# Patient Record
Sex: MALE | Race: WHITE | NOT HISPANIC OR LATINO | Employment: FULL TIME | URBAN - METROPOLITAN AREA
[De-identification: names, ages, dates, MRNs, and addresses within clinical notes are randomized per-mention and may not be internally consistent; named-entity substitution may affect disease eponyms.]

---
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
## 2021-02-05 ENCOUNTER — APPOINTMENT (OUTPATIENT)
Dept: CT IMAGING | Facility: HOSPITAL | Age: 21
End: 2021-02-05

## 2021-02-05 ENCOUNTER — HOSPITAL ENCOUNTER (EMERGENCY)
Facility: HOSPITAL | Age: 21
Discharge: HOME | End: 2021-02-05
Attending: EMERGENCY MEDICINE | Admitting: EMERGENCY MEDICINE

## 2021-02-05 VITALS
RESPIRATION RATE: 16 BRPM | WEIGHT: 239.2 LBS | BODY MASS INDEX: 37.54 KG/M2 | DIASTOLIC BLOOD PRESSURE: 86 MMHG | OXYGEN SATURATION: 98 % | HEART RATE: 75 BPM | HEIGHT: 67 IN | TEMPERATURE: 98.2 F | SYSTOLIC BLOOD PRESSURE: 130 MMHG

## 2021-02-05 DIAGNOSIS — R10.9 ACUTE ABDOMINAL PAIN: Primary | ICD-10-CM

## 2021-02-05 DIAGNOSIS — K52.9 GASTROENTERITIS: ICD-10-CM

## 2021-02-05 DIAGNOSIS — R11.10 NON-INTRACTABLE VOMITING, PRESENCE OF NAUSEA NOT SPECIFIED, UNSPECIFIED VOMITING TYPE: ICD-10-CM

## 2021-02-05 LAB
ALBUMIN SERPL-MCNC: 4.4 G/DL (ref 3.5–5.2)
ALBUMIN/GLOB SERPL: 1.3 G/DL
ALP SERPL-CCNC: 76 U/L (ref 39–117)
ALT SERPL W P-5'-P-CCNC: 26 U/L (ref 1–41)
ANION GAP SERPL CALCULATED.3IONS-SCNC: 12 MMOL/L (ref 5–15)
AST SERPL-CCNC: 22 U/L (ref 1–40)
BASOPHILS # BLD AUTO: 0 10*3/MM3 (ref 0–0.2)
BASOPHILS NFR BLD AUTO: 0.2 % (ref 0–1.5)
BILIRUB SERPL-MCNC: 0.7 MG/DL (ref 0–1.2)
BILIRUB UR QL STRIP: NEGATIVE
BUN SERPL-MCNC: 17 MG/DL (ref 6–20)
BUN/CREAT SERPL: 15.2 (ref 7–25)
CALCIUM SPEC-SCNC: 9 MG/DL (ref 8.6–10.5)
CHLORIDE SERPL-SCNC: 103 MMOL/L (ref 98–107)
CLARITY UR: CLEAR
CO2 SERPL-SCNC: 20 MMOL/L (ref 22–29)
COLOR UR: YELLOW
CREAT SERPL-MCNC: 1.12 MG/DL (ref 0.76–1.27)
DEPRECATED RDW RBC AUTO: 40.3 FL (ref 37–54)
EOSINOPHIL # BLD AUTO: 0 10*3/MM3 (ref 0–0.4)
EOSINOPHIL NFR BLD AUTO: 0.1 % (ref 0.3–6.2)
ERYTHROCYTE [DISTWIDTH] IN BLOOD BY AUTOMATED COUNT: 12.7 % (ref 12.3–15.4)
GFR SERPL CREATININE-BSD FRML MDRD: 82 ML/MIN/1.73
GLOBULIN UR ELPH-MCNC: 3.3 GM/DL
GLUCOSE SERPL-MCNC: 155 MG/DL (ref 65–99)
GLUCOSE UR STRIP-MCNC: NEGATIVE MG/DL
HCT VFR BLD AUTO: 44.8 % (ref 37.5–51)
HGB BLD-MCNC: 15.6 G/DL (ref 13–17.7)
HGB UR QL STRIP.AUTO: NEGATIVE
KETONES UR QL STRIP: NEGATIVE
LEUKOCYTE ESTERASE UR QL STRIP.AUTO: NEGATIVE
LIPASE SERPL-CCNC: 20 U/L (ref 13–60)
LYMPHOCYTES # BLD AUTO: 0.4 10*3/MM3 (ref 0.7–3.1)
LYMPHOCYTES NFR BLD AUTO: 4.8 % (ref 19.6–45.3)
MCH RBC QN AUTO: 31.2 PG (ref 26.6–33)
MCHC RBC AUTO-ENTMCNC: 34.9 G/DL (ref 31.5–35.7)
MCV RBC AUTO: 89.3 FL (ref 79–97)
MONOCYTES # BLD AUTO: 0.5 10*3/MM3 (ref 0.1–0.9)
MONOCYTES NFR BLD AUTO: 5 % (ref 5–12)
NEUTROPHILS NFR BLD AUTO: 8.5 10*3/MM3 (ref 1.7–7)
NEUTROPHILS NFR BLD AUTO: 89.9 % (ref 42.7–76)
NITRITE UR QL STRIP: NEGATIVE
NRBC BLD AUTO-RTO: 0 /100 WBC (ref 0–0.2)
PH UR STRIP.AUTO: 5.5 [PH] (ref 5–8)
PLATELET # BLD AUTO: 250 10*3/MM3 (ref 140–450)
PMV BLD AUTO: 7.9 FL (ref 6–12)
POTASSIUM SERPL-SCNC: 3.9 MMOL/L (ref 3.5–5.2)
PROT SERPL-MCNC: 7.7 G/DL (ref 6–8.5)
PROT UR QL STRIP: NEGATIVE
RBC # BLD AUTO: 5.01 10*6/MM3 (ref 4.14–5.8)
SARS-COV-2 RNA PNL SPEC NAA+PROBE: NORMAL
SODIUM SERPL-SCNC: 135 MMOL/L (ref 136–145)
SP GR UR STRIP: 1.08 (ref 1–1.03)
UROBILINOGEN UR QL STRIP: ABNORMAL
WBC # BLD AUTO: 9.4 10*3/MM3 (ref 3.4–10.8)
WHOLE BLOOD HOLD SPECIMEN: NORMAL

## 2021-02-05 PROCEDURE — 81003 URINALYSIS AUTO W/O SCOPE: CPT | Performed by: EMERGENCY MEDICINE

## 2021-02-05 PROCEDURE — 25010000002 ONDANSETRON PER 1 MG: Performed by: EMERGENCY MEDICINE

## 2021-02-05 PROCEDURE — 96374 THER/PROPH/DIAG INJ IV PUSH: CPT

## 2021-02-05 PROCEDURE — 87635 SARS-COV-2 COVID-19 AMP PRB: CPT | Performed by: EMERGENCY MEDICINE

## 2021-02-05 PROCEDURE — 99283 EMERGENCY DEPT VISIT LOW MDM: CPT

## 2021-02-05 PROCEDURE — 96375 TX/PRO/DX INJ NEW DRUG ADDON: CPT

## 2021-02-05 PROCEDURE — 80053 COMPREHEN METABOLIC PANEL: CPT | Performed by: EMERGENCY MEDICINE

## 2021-02-05 PROCEDURE — 74177 CT ABD & PELVIS W/CONTRAST: CPT

## 2021-02-05 PROCEDURE — 85025 COMPLETE CBC W/AUTO DIFF WBC: CPT | Performed by: EMERGENCY MEDICINE

## 2021-02-05 PROCEDURE — 25010000002 KETOROLAC TROMETHAMINE PER 15 MG: Performed by: EMERGENCY MEDICINE

## 2021-02-05 PROCEDURE — 0 IOPAMIDOL PER 1 ML: Performed by: EMERGENCY MEDICINE

## 2021-02-05 PROCEDURE — 83690 ASSAY OF LIPASE: CPT | Performed by: EMERGENCY MEDICINE

## 2021-02-05 RX ORDER — DICYCLOMINE HCL 20 MG
20 TABLET ORAL EVERY 6 HOURS PRN
Qty: 15 TABLET | Refills: 0 | Status: SHIPPED | OUTPATIENT
Start: 2021-02-05 | End: 2022-01-07

## 2021-02-05 RX ORDER — ONDANSETRON 2 MG/ML
4 INJECTION INTRAMUSCULAR; INTRAVENOUS ONCE
Status: COMPLETED | OUTPATIENT
Start: 2021-02-05 | End: 2021-02-05

## 2021-02-05 RX ORDER — ONDANSETRON 4 MG/1
4 TABLET, ORALLY DISINTEGRATING ORAL 4 TIMES DAILY PRN
Qty: 12 TABLET | Refills: 0 | Status: SHIPPED | OUTPATIENT
Start: 2021-02-05 | End: 2022-01-07

## 2021-02-05 RX ORDER — KETOROLAC TROMETHAMINE 15 MG/ML
15 INJECTION, SOLUTION INTRAMUSCULAR; INTRAVENOUS ONCE
Status: COMPLETED | OUTPATIENT
Start: 2021-02-05 | End: 2021-02-05

## 2021-02-05 RX ADMIN — ONDANSETRON 4 MG: 2 INJECTION, SOLUTION INTRAMUSCULAR; INTRAVENOUS at 05:29

## 2021-02-05 RX ADMIN — IOPAMIDOL 100 ML: 755 INJECTION, SOLUTION INTRAVENOUS at 05:57

## 2021-02-05 RX ADMIN — KETOROLAC TROMETHAMINE 15 MG: 15 INJECTION, SOLUTION INTRAMUSCULAR; INTRAVENOUS at 07:27

## 2021-02-05 RX ADMIN — SODIUM CHLORIDE 1000 ML: 9 INJECTION, SOLUTION INTRAVENOUS at 05:30

## 2021-02-05 NOTE — ED PROVIDER NOTES
Subjective   20-year-old male complains of moderate right lower quadrant pain associated with nonbloody vomiting and diarrhea since 5 PM yesterday afternoon.  No clear aggravating alleviating factors.  Patient has a coworker who may have Covid but otherwise denies any known sick contacts.          Review of Systems   Gastrointestinal: Positive for abdominal pain, diarrhea, nausea and vomiting.   All other systems reviewed and are negative.      No past medical history on file.    No Known Allergies    No past surgical history on file.    No family history on file.    Social History     Socioeconomic History   • Marital status: Single     Spouse name: Not on file   • Number of children: Not on file   • Years of education: Not on file   • Highest education level: Not on file   Tobacco Use   • Smoking status: Never Smoker   • Smokeless tobacco: Never Used   Substance and Sexual Activity   • Alcohol use: No     Frequency: Never   • Drug use: Defer   • Sexual activity: Defer           Objective   Physical Exam  Constitutional:       Appearance: Normal appearance. He is well-developed.   HENT:      Head: Normocephalic and atraumatic.      Mouth/Throat:      Mouth: Mucous membranes are moist.      Pharynx: Oropharynx is clear.   Eyes:      Conjunctiva/sclera: Conjunctivae normal.      Pupils: Pupils are equal, round, and reactive to light.   Neck:      Musculoskeletal: Normal range of motion and neck supple.   Cardiovascular:      Rate and Rhythm: Tachycardia present.      Heart sounds: Normal heart sounds.   Pulmonary:      Effort: Pulmonary effort is normal.      Breath sounds: Normal breath sounds.   Abdominal:      General: Bowel sounds are normal. There is no distension.      Palpations: Abdomen is soft.      Comments: Mild tenderness right lower quadrant, no rebound or guarding   Musculoskeletal: Normal range of motion.   Skin:     General: Skin is warm and dry.      Capillary Refill: Capillary refill takes less than  2 seconds.   Neurological:      General: No focal deficit present.      Mental Status: He is alert and oriented to person, place, and time.   Psychiatric:         Mood and Affect: Mood normal.         Behavior: Behavior normal.         Procedures           ED Course                                           MDM  Number of Diagnoses or Management Options  Acute abdominal pain:   Gastroenteritis:   Diagnosis management comments: Results for orders placed or performed during the hospital encounter of 02/05/21  -COVID-19, [facility] IN-HOUSE,NASAL Swab (NO TRANSPORT MEDIA) 2 HR TAT - Swab, Nasopharynx  Specimen: Nasopharynx; Swab       Result                      Value             Ref Range           COVID19                                       Presumptive *   Presumptive Negative  -Comprehensive Metabolic Panel  Specimen: Blood       Result                      Value             Ref Range           Glucose                     155 (H)           65 - 99 mg/dL       BUN                         17                6 - 20 mg/dL        Creatinine                  1.12              0.76 - 1.27 *       Sodium                      135 (L)           136 - 145 mm*       Potassium                   3.9               3.5 - 5.2 mm*       Chloride                    103               98 - 107 mmo*       CO2                         20.0 (L)          22.0 - 29.0 *       Calcium                     9.0               8.6 - 10.5 m*       Total Protein               7.7               6.0 - 8.5 g/*       Albumin                     4.40              3.50 - 5.20 *       ALT (SGPT)                  26                1 - 41 U/L          AST (SGOT)                  22                1 - 40 U/L          Alkaline Phosphatase        76                39 - 117 U/L        Total Bilirubin             0.7               0.0 - 1.2 mg*       eGFR Non African Amer       82                >60 mL/min/1*       Globulin                    3.3               gm/dL                A/G Ratio                   1.3               g/dL                BUN/Creatinine Ratio        15.2              7.0 - 25.0          Anion Gap                   12.0              5.0 - 15.0 m*  -Lipase  Specimen: Blood       Result                      Value             Ref Range           Lipase                      20                13 - 60 U/L    -Urinalysis With Culture If Indicated - Urine, Clean Catch  Specimen: Urine, Clean Catch       Result                      Value             Ref Range           Color, UA                   Yellow            Yellow, Straw       Appearance, UA              Clear             Clear               pH, UA                      5.5               5.0 - 8.0           Specific [place], UA        1.080 (H)         1.005 - 1.030       Glucose, UA                 Negative          Negative            Ketones, UA                 Negative          Negative            Bilirubin, UA               Negative          Negative            Blood, UA                   Negative          Negative            Protein, UA                 Negative          Negative            Leuk Esterase, UA           Negative          Negative            Nitrite, UA                 Negative          Negative            Urobilinogen, UA            0.2 E.U./dL       0.2 - 1.0 E.*  -CBC Auto Differential  Specimen: Blood       Result                      Value             Ref Range           WBC                         9.40              3.40 - 10.80*       RBC                         5.01              4.14 - 5.80 *       Hemoglobin                  15.6              13.0 - 17.7 *       Hematocrit                  44.8              37.5 - 51.0 %       MCV                         89.3              79.0 - 97.0 *       MCH                         31.2              26.6 - 33.0 *       MCHC                        34.9              31.5 - 35.7 *       RDW                         12.7              12.3 - 15.4 %        RDW-SD                      40.3              37.0 - 54.0 *       MPV                         7.9               6.0 - 12.0 fL       Platelets                   250               140 - 450 10*       Neutrophil %                89.9 (H)          42.7 - 76.0 %       Lymphocyte %                4.8 (L)           19.6 - 45.3 %       Monocyte %                  5.0               5.0 - 12.0 %        Eosinophil %                0.1 (L)           0.3 - 6.2 %         Basophil %                  0.2               0.0 - 1.5 %         Neutrophils, Absolute       8.50 (H)          1.70 - 7.00 *       Lymphocytes, Absolute       0.40 (L)          0.70 - 3.10 *       Monocytes, Absolute         0.50              0.10 - 0.90 *       Eosinophils, Absolute       0.00              0.00 - 0.40 *       Basophils, Absolute         0.00              0.00 - 0.20 *       nRBC                        0.0               0.0 - 0.2 /1*  -Light Blue Top       Result                      Value             Ref Range           Extra Tube                                                    hold for add-on    Ct without any acute findings.  Patient appears well, no vomiting or diarrhea in ED.  Likely GE with dehydration and abdominal cramping.  Will DC on zofran and bentyl as needed.       Amount and/or Complexity of Data Reviewed  Clinical lab tests: reviewed  Tests in the radiology section of CPT®: reviewed        Final diagnoses:   Acute abdominal pain   Gastroenteritis            Johnny Devi MD  02/05/21 0704

## 2021-02-08 ENCOUNTER — HOSPITAL ENCOUNTER (EMERGENCY)
Facility: HOSPITAL | Age: 21
Discharge: LEFT BEFORE DISCHARGE | End: 2021-02-08

## 2021-02-08 PROCEDURE — 99211 OFF/OP EST MAY X REQ PHY/QHP: CPT

## 2022-01-07 PROCEDURE — U0004 COV-19 TEST NON-CDC HGH THRU: HCPCS | Performed by: FAMILY MEDICINE

## 2022-03-15 PROCEDURE — 87086 URINE CULTURE/COLONY COUNT: CPT | Performed by: NURSE PRACTITIONER

## 2025-02-17 ENCOUNTER — CONSULT (OUTPATIENT)
Dept: ONCOLOGY | Facility: CLINIC | Age: 25
End: 2025-02-17
Payer: COMMERCIAL

## 2025-02-17 ENCOUNTER — LAB (OUTPATIENT)
Dept: LAB | Facility: HOSPITAL | Age: 25
End: 2025-02-17
Payer: COMMERCIAL

## 2025-02-17 VITALS
RESPIRATION RATE: 18 BRPM | TEMPERATURE: 98.2 F | BODY MASS INDEX: 35.35 KG/M2 | WEIGHT: 261 LBS | HEART RATE: 69 BPM | HEIGHT: 72 IN | OXYGEN SATURATION: 98 % | SYSTOLIC BLOOD PRESSURE: 134 MMHG | DIASTOLIC BLOOD PRESSURE: 76 MMHG

## 2025-02-17 DIAGNOSIS — R22.2 SUPRACLAVICULAR MASS: Primary | ICD-10-CM

## 2025-02-17 PROBLEM — J01.00 SINUSITIS, ACUTE MAXILLARY: Status: ACTIVE | Noted: 2018-09-25

## 2025-02-17 PROBLEM — F98.8 ATTENTION DEFICIT DISORDER (ADD) WITHOUT HYPERACTIVITY: Status: ACTIVE | Noted: 2018-05-01

## 2025-02-17 PROBLEM — M25.512 SHOULDER PAIN, LEFT: Status: ACTIVE | Noted: 2017-11-30

## 2025-02-17 PROBLEM — S61.012D: Status: ACTIVE | Noted: 2018-07-31

## 2025-02-17 LAB
ALBUMIN SERPL-MCNC: 4.2 G/DL (ref 3.5–5.2)
ALBUMIN/GLOB SERPL: 1.4 G/DL
ALP SERPL-CCNC: 98 U/L (ref 39–117)
ALT SERPL W P-5'-P-CCNC: 80 U/L (ref 1–41)
ANION GAP SERPL CALCULATED.3IONS-SCNC: 10.1 MMOL/L (ref 5–15)
AST SERPL-CCNC: 90 U/L (ref 1–40)
BASOPHILS # BLD AUTO: 0.02 10*3/MM3 (ref 0–0.2)
BASOPHILS NFR BLD AUTO: 0.2 % (ref 0–1.5)
BILIRUB SERPL-MCNC: 0.3 MG/DL (ref 0–1.2)
BUN SERPL-MCNC: 14 MG/DL (ref 6–20)
BUN/CREAT SERPL: 15.6 (ref 7–25)
CALCIUM SPEC-SCNC: 9.4 MG/DL (ref 8.6–10.5)
CHLORIDE SERPL-SCNC: 107 MMOL/L (ref 98–107)
CO2 SERPL-SCNC: 25.9 MMOL/L (ref 22–29)
CREAT SERPL-MCNC: 0.9 MG/DL (ref 0.76–1.27)
DEPRECATED RDW RBC AUTO: 42.4 FL (ref 37–54)
EGFRCR SERPLBLD CKD-EPI 2021: 120.8 ML/MIN/1.73
EOSINOPHIL # BLD AUTO: 0.13 10*3/MM3 (ref 0–0.4)
EOSINOPHIL NFR BLD AUTO: 1.6 % (ref 0.3–6.2)
ERYTHROCYTE [DISTWIDTH] IN BLOOD BY AUTOMATED COUNT: 13.2 % (ref 12.3–15.4)
GLOBULIN UR ELPH-MCNC: 2.9 GM/DL
GLUCOSE SERPL-MCNC: 112 MG/DL (ref 65–99)
HCT VFR BLD AUTO: 43.9 % (ref 37.5–51)
HGB BLD-MCNC: 14.3 G/DL (ref 13–17.7)
HOLD SPECIMEN: NORMAL
HOLD SPECIMEN: NORMAL
LDH SERPL-CCNC: 221 U/L (ref 135–225)
LYMPHOCYTES # BLD AUTO: 2.32 10*3/MM3 (ref 0.7–3.1)
LYMPHOCYTES NFR BLD AUTO: 28.1 % (ref 19.6–45.3)
MCH RBC QN AUTO: 29.2 PG (ref 26.6–33)
MCHC RBC AUTO-ENTMCNC: 32.6 G/DL (ref 31.5–35.7)
MCV RBC AUTO: 89.8 FL (ref 79–97)
MONOCYTES # BLD AUTO: 0.82 10*3/MM3 (ref 0.1–0.9)
MONOCYTES NFR BLD AUTO: 9.9 % (ref 5–12)
NEUTROPHILS NFR BLD AUTO: 4.96 10*3/MM3 (ref 1.7–7)
NEUTROPHILS NFR BLD AUTO: 60.2 % (ref 42.7–76)
PLATELET # BLD AUTO: 256 10*3/MM3 (ref 140–450)
PMV BLD AUTO: 9.6 FL (ref 6–12)
POTASSIUM SERPL-SCNC: 4.1 MMOL/L (ref 3.5–5.2)
PROT SERPL-MCNC: 7.1 G/DL (ref 6–8.5)
RBC # BLD AUTO: 4.89 10*6/MM3 (ref 4.14–5.8)
SODIUM SERPL-SCNC: 143 MMOL/L (ref 136–145)
WBC NRBC COR # BLD AUTO: 8.25 10*3/MM3 (ref 3.4–10.8)

## 2025-02-17 PROCEDURE — 83615 LACTATE (LD) (LDH) ENZYME: CPT | Performed by: INTERNAL MEDICINE

## 2025-02-17 PROCEDURE — 36415 COLL VENOUS BLD VENIPUNCTURE: CPT

## 2025-02-17 PROCEDURE — 85025 COMPLETE CBC W/AUTO DIFF WBC: CPT

## 2025-02-17 PROCEDURE — 80053 COMPREHEN METABOLIC PANEL: CPT | Performed by: INTERNAL MEDICINE

## 2025-02-17 PROCEDURE — 99204 OFFICE O/P NEW MOD 45 MIN: CPT | Performed by: INTERNAL MEDICINE

## 2025-02-17 RX ORDER — CHOLECALCIFEROL (VITAMIN D3) 25 MCG
TABLET ORAL
COMMUNITY
Start: 2024-10-01

## 2025-02-17 RX ORDER — GINSENG 100 MG
CAPSULE ORAL
COMMUNITY

## 2025-02-17 RX ORDER — BUPROPION HYDROCHLORIDE 150 MG/1
150 TABLET ORAL EVERY MORNING
COMMUNITY
Start: 2025-02-05

## 2025-02-17 NOTE — PROGRESS NOTES
Hematology/Oncology Outpatient Consultation    Patient name: Johnny Miller  : 2000  MRN: [id number]  Primary Care Physician: Provider, No Known  Referring Physician: Graciela Cotter APRN  Reason For Consult:     Chief Complaint   Patient presents with    Appointment     Supraclavicular mass       History of Present Illness:    24-year-old male who has been referred secondary to right supraclavicular mass.  Patient felt a lump on the right neck over the past 4 months.  At the time he noticed it was around the time he had COVID-19 infection in 2024.  He denies night sweats weight loss or fatigue symptoms.  Patient does not smoke.  He drinks only 1 beer per week.  As far as he knows there is no family history of cancer.  States that the mass on the right neck is not painful.    For that reason patient had an ultrasound of the neck completed in 2025 which basically revealed a 3.9 cm well-circumscribed mass right supraclavicular area suspicious characteristics.    For that reason he has been referred to us for further evaluation and management      Patient is alone today for this appointment.      Past Medical History:   Diagnosis Date    Acne     ADHD        Past Surgical History:   Procedure Laterality Date    NO PAST SURGERIES           Current Outpatient Medications:     ASHWAGANDHA PO, Take 600 mg by mouth., Disp: , Rfl:     buPROPion XL (WELLBUTRIN XL) 150 MG 24 hr tablet, Take 1 tablet by mouth Every Morning., Disp: , Rfl:     cholecalciferol (Vitamin D) 25 MCG (1000 UT) tablet, , Disp: , Rfl:     pyridoxine ([employer] B6) 100 MG tablet, Take 1 tablet by mouth Daily., Disp: , Rfl:     sulfamethoxazole-trimethoprim (BACTRIM DS,SEPTRA DS) 800-160 MG per tablet, Take 1 tablet by mouth 2 (Two) Times a Day., Disp: 20 tablet, Rfl: 0    Zinc 50 MG tablet, Take  by mouth., Disp: , Rfl:     No Known Allergies    Immunization History   Administered Date(s) Administered    COVID-19 (MODERNA) ,,3rd  "Dose Monovalent 03/31/2021, 04/28/2021    DTaP 2000, 2000, 01/04/1999, 03/15/2000, 08/27/2003    Flu Vaccine Quad PF 6-35MO 11/09/2015    H1N1 Inj 11/09/2009    Hep B, Adolescent or Pediatric 2000, 2000, 04/13/1999    HiB 2000, 2000, 01/04/1999, 11/23/1999    IPV 1998, 1998, 11/23/1999, 08/27/2003    MMR 07/09/1999, 08/27/2003    Meningococcal MCV4P (Menactra) 06/24/2010, 07/15/2015    Tdap 06/24/2010    Varicella 07/09/1999, 06/24/2010       No family history on file.    Cancer-related family history is not on file.    Social History     Tobacco Use    Smoking status: Never    Smokeless tobacco: Never   Substance Use Topics    Alcohol use: Yes    Drug use: Defer       ROS:    Review of Systems   Constitutional:  Negative for chills, fatigue and fever.   HENT:  Negative for congestion, drooling, ear discharge, rhinorrhea, sinus pressure and tinnitus.    Eyes:  Negative for photophobia, pain and discharge.   Respiratory:  Negative for apnea, choking and stridor.    Cardiovascular:  Negative for palpitations.   Gastrointestinal:  Negative for abdominal distention, abdominal pain and anal bleeding.   Endocrine: Negative for polydipsia and polyphagia.   Genitourinary:  Negative for decreased urine volume, flank pain and genital sores.   Musculoskeletal:  Negative for gait problem, neck pain and neck stiffness.   Skin:  Negative for color change, rash and wound.   Neurological:  Negative for tremors, seizures, syncope, facial asymmetry and speech difficulty.   Hematological:  Negative for adenopathy.   Psychiatric/Behavioral:  Negative for agitation, confusion, hallucinations and self-injury. The patient is not hyperactive.        Objective:    Vitals:    02/17/25 1206   BP: 134/76   Pulse: 69   Resp: 18   Temp: 98.2 °F (36.8 °C)   TempSrc: Infrared   SpO2: 98%   Weight: 118 kg (261 lb)   Height: 182.9 cm (72\")   PainSc: 0-No pain     Body mass index is 35.4 " kg/m².    ECOG  (0) Fully active, able to carry on all predisease performance without restriction    Physical Exam:  Physical Exam  Vitals and nursing note reviewed.   Constitutional:       General: He is not in acute distress.     Appearance: He is not diaphoretic.   HENT:      Head: Normocephalic and atraumatic.      Comments: Right supraclavicular mass measuring approximately 4 cm  Eyes:      General: No scleral icterus.        Right eye: No discharge.         Left eye: No discharge.      Conjunctiva/sclera: Conjunctivae normal.   Neck:      Thyroid: No thyromegaly.   Cardiovascular:      Rate and Rhythm: Normal rate and regular rhythm.      Heart sounds: Normal heart sounds.      No friction rub. No gallop.   Pulmonary:      Effort: Pulmonary effort is normal. No respiratory distress.      Breath sounds: No stridor. No wheezing.   Abdominal:      General: Bowel sounds are normal.      Palpations: Abdomen is soft. There is no mass.      Tenderness: There is no abdominal tenderness. There is no guarding or rebound.   Musculoskeletal:         General: No tenderness. Normal range of motion.      Cervical back: Normal range of motion and neck supple.   Lymphadenopathy:      Cervical: No cervical adenopathy.   Skin:     General: Skin is warm.      Findings: No erythema or rash.   Neurological:      Mental Status: He is alert and oriented to person, place, and time.      Motor: No abnormal muscle tone.   Psychiatric:         Behavior: Behavior normal.         RECENT LABS  WBC   Date Value Ref Range Status   02/17/2025 8.25 3.40 - 10.80 10*3/mm3 Final     RBC   Date Value Ref Range Status   02/17/2025 4.89 4.14 - 5.80 10*6/mm3 Final     Hemoglobin   Date Value Ref Range Status   02/17/2025 14.3 13.0 - 17.7 g/dL Final     Hematocrit   Date Value Ref Range Status   02/17/2025 43.9 37.5 - 51.0 % Final     MCV   Date Value Ref Range Status   02/17/2025 89.8 79.0 - 97.0 fL Final     MCH   Date Value Ref Range Status    02/17/2025 29.2 26.6 - 33.0 pg Final     MCHC   Date Value Ref Range Status   02/17/2025 32.6 31.5 - 35.7 g/dL Final     RDW   Date Value Ref Range Status   02/17/2025 13.2 12.3 - 15.4 % Final     RDW-SD   Date Value Ref Range Status   02/17/2025 42.4 37.0 - 54.0 fl Final     MPV   Date Value Ref Range Status   02/17/2025 9.6 6.0 - 12.0 fL Final     Platelets   Date Value Ref Range Status   02/17/2025 256 140 - 450 10*3/mm3 Final     Neutrophil %   Date Value Ref Range Status   02/17/2025 60.2 42.7 - 76.0 % Final     Lymphocyte %   Date Value Ref Range Status   02/17/2025 28.1 19.6 - 45.3 % Final     Monocyte %   Date Value Ref Range Status   02/17/2025 9.9 5.0 - 12.0 % Final     Eosinophil %   Date Value Ref Range Status   02/17/2025 1.6 0.3 - 6.2 % Final     Basophil %   Date Value Ref Range Status   02/17/2025 0.2 0.0 - 1.5 % Final     Neutrophils, Absolute   Date Value Ref Range Status   02/17/2025 4.96 1.70 - 7.00 10*3/mm3 Final     Lymphocytes, Absolute   Date Value Ref Range Status   02/17/2025 2.32 0.70 - 3.10 10*3/mm3 Final     Monocytes, Absolute   Date Value Ref Range Status   02/17/2025 0.82 0.10 - 0.90 10*3/mm3 Final     Eosinophils, Absolute   Date Value Ref Range Status   02/17/2025 0.13 0.00 - 0.40 10*3/mm3 Final     Basophils, Absolute   Date Value Ref Range Status   02/17/2025 0.02 0.00 - 0.20 10*3/mm3 Final     nRBC   Date Value Ref Range Status   02/05/2021 0.0 0.0 - 0.2 /100 WBC Final       Lab Results   Component Value Date    GLUCOSE 155 (H) 02/05/2021    BUN 17 02/05/2021    CREATININE 1.12 02/05/2021    EGFRIFNONA 82 02/05/2021    BCR 15.2 02/05/2021    K 3.9 02/05/2021    CO2 20.0 (L) 02/05/2021    CALCIUM 9.0 02/05/2021    ALBUMIN 4.40 02/05/2021    AST 22 02/05/2021    ALT 26 02/05/2021         Assessment & Plan   Supraclavicular mass  - CBC & Differential      Right supraclavicular mass unclear etiology.  Reviewed the possibilities with patient.  Close that this is most likely  pathology given the location and also the size of the mass.  Discussed that we will need biopsy to confirm and determine the etiology of the mass              Plans      CMP today, LDH uric acid level.   Schedule patient CT scan of the neck and chest with contrast to evaluate right supraclavicular mass.   Obtain  the CT scan as soon as possible as I would like him to have this before he sees Dr. Bradford next week for surgical biopsy,     fu in about 3 weeks or so the review the results.         I spent 45 total minutes, face-to-face, caring for Johnny today. 90% of this time involved counseling and/or coordination of care as documented within this note.         Electronically signed by Alethea Mehta MD, 02/17/25, 5:39 PM EST.

## 2025-02-17 NOTE — LETTER
2025     CINDI Jung  [address] [place] [address]    Patient: Johnny Miller   YOB: 2000   Date of Visit: 2025     Dear CINDI Jung:       Thank you for referring Johnny Miller to me for evaluation. Below are the relevant portions of my assessment and plan of care.    If you have questions, please do not hesitate to call me. I look forward to following Johnny along with you.         Sincerely,        Alethea Mehta MD        CC: No Recipients    Alethea Mehta MD  [phone]  Sign when Signing Visit   Hematology/Oncology Outpatient Consultation    Patient name: Johnny Miller  : 2000  MRN: [id number]  Primary Care Physician: Provider, No Known  Referring Physician: Graciela Cotter APRN  Reason For Consult:     Chief Complaint   Patient presents with   • Appointment     Supraclavicular mass       History of Present Illness:    24-year-old male who has been referred secondary to right supraclavicular mass.  Patient felt a lump on the right neck over the past 4 months.  At the time he noticed it was around the time he had COVID-19 infection in 2024.  He denies night sweats weight loss or fatigue symptoms.  Patient does not smoke.  He drinks only 1 beer per week.  As far as he knows there is no family history of cancer.  States that the mass on the right neck is not painful.    For that reason patient had an ultrasound of the neck completed in 2025 which basically revealed a 3.9 cm well-circumscribed mass right supraclavicular area suspicious characteristics.    For that reason he has been referred to us for further evaluation and management      Patient is alone today for this appointment.      Past Medical History:   Diagnosis Date   • Acne    • ADHD        Past Surgical History:   Procedure Laterality Date   • NO PAST SURGERIES           Current Outpatient Medications:   •   ASHWAGANDHA PO, Take 600 mg by mouth., Disp: , Rfl:   •  buPROPion XL (WELLBUTRIN XL) 150 MG 24 hr tablet, Take 1 tablet by mouth Every Morning., Disp: , Rfl:   •  cholecalciferol (Vitamin D) 25 MCG (1000 UT) tablet, , Disp: , Rfl:   •  pyridoxine (CVS B6) 100 MG tablet, Take 1 tablet by mouth Daily., Disp: , Rfl:   •  sulfamethoxazole-trimethoprim (BACTRIM DS,SEPTRA DS) 800-160 MG per tablet, Take 1 tablet by mouth 2 (Two) Times a Day., Disp: 20 tablet, Rfl: 0  •  Zinc 50 MG tablet, Take  by mouth., Disp: , Rfl:     No Known Allergies    Immunization History   Administered Date(s) Administered   • COVID-19 (MODERNA) 1st,2nd,3rd Dose Monovalent 03/31/2021, 04/28/2021   • DTaP 2000, 2000, 01/04/1999, 03/15/2000, 08/27/2003   • Flu Vaccine Quad PF 6-35MO 11/09/2015   • H1N1 Inj 11/09/2009   • Hep B, Adolescent or Pediatric 1998, 1998, 04/13/1999   • HiB 2000, 1998, 01/04/1999, 11/23/1999   • IPV 2000, 1998, 11/23/1999, 08/27/2003   • MMR 07/09/1999, 08/27/2003   • Meningococcal MCV4P (Menactra) 06/24/2010, 07/15/2015   • Tdap 06/24/2010   • Varicella 07/09/1999, 06/24/2010       No family history on file.    Cancer-related family history is not on file.    Social History     Tobacco Use   • Smoking status: Never   • Smokeless tobacco: Never   Substance Use Topics   • Alcohol use: Yes   • Drug use: Defer       ROS:    Review of Systems   Constitutional:  Negative for chills, fatigue and fever.   HENT:  Negative for congestion, drooling, ear discharge, rhinorrhea, sinus pressure and tinnitus.    Eyes:  Negative for photophobia, pain and discharge.   Respiratory:  Negative for apnea, choking and stridor.    Cardiovascular:  Negative for palpitations.   Gastrointestinal:  Negative for abdominal distention, abdominal pain and anal bleeding.   Endocrine: Negative for polydipsia and polyphagia.   Genitourinary:  Negative for decreased urine volume, flank pain and genital sores.  "  Musculoskeletal:  Negative for gait problem, neck pain and neck stiffness.   Skin:  Negative for color change, rash and wound.   Neurological:  Negative for tremors, seizures, syncope, facial asymmetry and speech difficulty.   Hematological:  Negative for adenopathy.   Psychiatric/Behavioral:  Negative for agitation, confusion, hallucinations and self-injury. The patient is not hyperactive.        Objective:    Vitals:    02/17/25 1206   BP: 134/76   Pulse: 69   Resp: 18   Temp: 98.2 °F (36.8 °C)   TempSrc: Infrared   SpO2: 98%   Weight: 118 kg (261 lb)   Height: 182.9 cm (72\")   PainSc: 0-No pain     Body mass index is 35.4 kg/m².    ECOG  (0) Fully active, able to carry on all predisease performance without restriction    Physical Exam:  Physical Exam  Vitals and nursing note reviewed.   Constitutional:       General: He is not in acute distress.     Appearance: He is not diaphoretic.   HENT:      Head: Normocephalic and atraumatic.      Comments: Right supraclavicular mass measuring approximately 4 cm  Eyes:      General: No scleral icterus.        Right eye: No discharge.         Left eye: No discharge.      Conjunctiva/sclera: Conjunctivae normal.   Neck:      Thyroid: No thyromegaly.   Cardiovascular:      Rate and Rhythm: Normal rate and regular rhythm.      Heart sounds: Normal heart sounds.      No friction rub. No gallop.   Pulmonary:      Effort: Pulmonary effort is normal. No respiratory distress.      Breath sounds: No stridor. No wheezing.   Abdominal:      General: Bowel sounds are normal.      Palpations: Abdomen is soft. There is no mass.      Tenderness: There is no abdominal tenderness. There is no guarding or rebound.   Musculoskeletal:         General: No tenderness. Normal range of motion.      Cervical back: Normal range of motion and neck supple.   Lymphadenopathy:      Cervical: No cervical adenopathy.   Skin:     General: Skin is warm.      Findings: No erythema or rash.   Neurological: "      Mental Status: He is alert and oriented to person, place, and time.      Motor: No abnormal muscle tone.   Psychiatric:         Behavior: Behavior normal.         RECENT LABS  WBC   Date Value Ref Range Status   02/17/2025 8.25 3.40 - 10.80 10*3/mm3 Final     RBC   Date Value Ref Range Status   02/17/2025 4.89 4.14 - 5.80 10*6/mm3 Final     Hemoglobin   Date Value Ref Range Status   02/17/2025 14.3 13.0 - 17.7 g/dL Final     Hematocrit   Date Value Ref Range Status   02/17/2025 43.9 37.5 - 51.0 % Final     MCV   Date Value Ref Range Status   02/17/2025 89.8 79.0 - 97.0 fL Final     MCH   Date Value Ref Range Status   02/17/2025 29.2 26.6 - 33.0 pg Final     MCHC   Date Value Ref Range Status   02/17/2025 32.6 31.5 - 35.7 g/dL Final     RDW   Date Value Ref Range Status   02/17/2025 13.2 12.3 - 15.4 % Final     RDW-SD   Date Value Ref Range Status   02/17/2025 42.4 37.0 - 54.0 fl Final     MPV   Date Value Ref Range Status   02/17/2025 9.6 6.0 - 12.0 fL Final     Platelets   Date Value Ref Range Status   02/17/2025 256 140 - 450 10*3/mm3 Final     Neutrophil %   Date Value Ref Range Status   02/17/2025 60.2 42.7 - 76.0 % Final     Lymphocyte %   Date Value Ref Range Status   02/17/2025 28.1 19.6 - 45.3 % Final     Monocyte %   Date Value Ref Range Status   02/17/2025 9.9 5.0 - 12.0 % Final     Eosinophil %   Date Value Ref Range Status   02/17/2025 1.6 0.3 - 6.2 % Final     Basophil %   Date Value Ref Range Status   02/17/2025 0.2 0.0 - 1.5 % Final     Neutrophils, Absolute   Date Value Ref Range Status   02/17/2025 4.96 1.70 - 7.00 10*3/mm3 Final     Lymphocytes, Absolute   Date Value Ref Range Status   02/17/2025 2.32 0.70 - 3.10 10*3/mm3 Final     Monocytes, Absolute   Date Value Ref Range Status   02/17/2025 0.82 0.10 - 0.90 10*3/mm3 Final     Eosinophils, Absolute   Date Value Ref Range Status   02/17/2025 0.13 0.00 - 0.40 10*3/mm3 Final     Basophils, Absolute   Date Value Ref Range Status   02/17/2025  0.02 0.00 - 0.20 10*3/mm3 Final     nRBC   Date Value Ref Range Status   02/05/2021 0.0 0.0 - 0.2 /100 WBC Final       Lab Results   Component Value Date    GLUCOSE 155 (H) 02/05/2021    BUN 17 02/05/2021    CREATININE 1.12 02/05/2021    EGFRIFNONA 82 02/05/2021    BCR 15.2 02/05/2021    K 3.9 02/05/2021    CO2 20.0 (L) 02/05/2021    CALCIUM 9.0 02/05/2021    ALBUMIN 4.40 02/05/2021    AST 22 02/05/2021    ALT 26 02/05/2021         Assessment & Plan  Supraclavicular mass  - CBC & Differential      Right supraclavicular mass unclear etiology.  Reviewed the possibilities with patient.  Close that this is most likely pathology given the location and also the size of the mass.  Discussed that we will need biopsy to confirm and determine the etiology of the mass              Plans      CMP today, LDH uric acid level.   Schedule patient CT scan of the neck and chest with contrast to evaluate right supraclavicular mass.   Obtain  the CT scan as soon as possible as I would like him to have this before he sees Dr. Bradford next week for surgical biopsy,     fu in about 3 weeks or so the review the results.         I spent 45 total minutes, face-to-face, caring for Johnny today. 90% of this time involved counseling and/or coordination of care as documented within this note.         Electronically signed by Alethea Mehta MD, 02/17/25, 5:39 PM EST.

## 2025-02-19 ENCOUNTER — PATIENT ROUNDING (BHMG ONLY) (OUTPATIENT)
Dept: ONCOLOGY | Facility: CLINIC | Age: 25
End: 2025-02-19
Payer: COMMERCIAL

## 2025-02-19 NOTE — PROGRESS NOTES
February 19, 2025    Hello, may I speak with Johnny Miller?    My name is Sophy Perez      I am  with [facility]  [address] [facility] [place] [address] [phone].    Before we get started may I verify your date of birth? 2000    I am calling to officially welcome you to our practice and ask about your recent visit. Is this a good time to talk? no    Tell me about your visit with us. What things went well?  A My Chart message was sent to the patient.         We're always looking for ways to make our patients' experiences even better. Do you have recommendations on ways we may improve?  no    Overall were you satisfied with your first visit to our practice? yes       I appreciate you taking the time to speak with me today. Is there anything else I can do for you? no      Thank you, and have a great day.

## 2025-02-24 ENCOUNTER — HOSPITAL ENCOUNTER (OUTPATIENT)
Dept: PET IMAGING | Facility: HOSPITAL | Age: 25
Discharge: HOME | End: 2025-02-24
Admitting: INTERNAL MEDICINE
Payer: COMMERCIAL

## 2025-02-24 DIAGNOSIS — R22.2 SUPRACLAVICULAR MASS: ICD-10-CM

## 2025-02-24 PROCEDURE — 25510000001 IOPAMIDOL PER 1 ML: Performed by: INTERNAL MEDICINE

## 2025-02-24 PROCEDURE — 70491 CT SOFT TISSUE NECK W/DYE: CPT

## 2025-02-24 PROCEDURE — 71260 CT THORAX DX C+: CPT

## 2025-02-24 RX ORDER — IOPAMIDOL 755 MG/ML
100 INJECTION, SOLUTION INTRAVASCULAR
Status: COMPLETED | OUTPATIENT
Start: 2025-02-24 | End: 2025-02-24

## 2025-02-24 RX ADMIN — IOPAMIDOL 100 ML: 755 INJECTION, SOLUTION INTRAVENOUS at 10:38

## 2025-02-26 ENCOUNTER — TELEPHONE (OUTPATIENT)
Dept: ONCOLOGY | Facility: CLINIC | Age: 25
End: 2025-02-26

## 2025-02-26 DIAGNOSIS — R22.2 SUPRACLAVICULAR MASS: Primary | ICD-10-CM

## 2025-02-26 NOTE — TELEPHONE ENCOUNTER
Caller: Johnny Miller    Relationship: Self    Best call back number: [phone]    What test was performed: CT SCAN    When was the test performed: 2/24/25    Where was the test performed:     Additional notes: RESULTS STATE THAT A BX IS ADVISED - PT CALLING TO SEE IF HE NEEDS TO SCHEDULE A BX, PLEASE ADVISE?

## 2025-03-04 ENCOUNTER — OFFICE VISIT (OUTPATIENT)
Dept: SURGERY | Facility: CLINIC | Age: 25
End: 2025-03-04
Payer: COMMERCIAL

## 2025-03-04 VITALS
WEIGHT: 262.3 LBS | SYSTOLIC BLOOD PRESSURE: 124 MMHG | HEART RATE: 81 BPM | OXYGEN SATURATION: 97 % | HEIGHT: 72 IN | DIASTOLIC BLOOD PRESSURE: 80 MMHG | BODY MASS INDEX: 35.53 KG/M2 | TEMPERATURE: 98.6 F

## 2025-03-04 DIAGNOSIS — R59.1 LYMPHADENOPATHY: Primary | ICD-10-CM

## 2025-03-04 PROCEDURE — 99204 OFFICE O/P NEW MOD 45 MIN: CPT | Performed by: SURGERY

## 2025-03-04 RX ORDER — SODIUM CHLORIDE 9 MG/ML
40 INJECTION, SOLUTION INTRAVENOUS AS NEEDED
Status: CANCELLED | OUTPATIENT
Start: 2025-03-04

## 2025-03-04 RX ORDER — SODIUM CHLORIDE 0.9 % (FLUSH) 0.9 %
3 SYRINGE (ML) INJECTION EVERY 12 HOURS SCHEDULED
Status: CANCELLED | OUTPATIENT
Start: 2025-03-04

## 2025-03-04 RX ORDER — SODIUM CHLORIDE 9 MG/ML
100 INJECTION, SOLUTION INTRAVENOUS CONTINUOUS
Status: CANCELLED | OUTPATIENT
Start: 2025-03-04 | End: 2025-03-05

## 2025-03-04 RX ORDER — SODIUM CHLORIDE 0.9 % (FLUSH) 0.9 %
3-10 SYRINGE (ML) INJECTION AS NEEDED
Status: CANCELLED | OUTPATIENT
Start: 2025-03-04

## 2025-03-04 NOTE — H&P (VIEW-ONLY)
General Surgery History and Physical      Referring Provider: Alethea Mehta, *    Chief Complaint:    Lymphadenopathy    History of Present Illness  The patient is a 24-year-old male here for evaluation of right supraclavicular lymphadenopathy for possible biopsy.    He has been under the care of Dr. Mehta since 10/2024 for a growth that has been fluctuating in size, but not consistently enlarging. A CT scan was performed, indicating signs suggestive of lymphoma, although a definitive diagnosis could not be made without a tissue sample. He was subsequently referred to our facility for a surgical consultation. He reports no presence of similar growths on the other side, in his armpits, or groin area. He has been experiencing mild fatigue.  Over the past few months, there has been an increase in his fatigue levels, leading to decreased activity.  Denies any weight loss or night sweats.     Past Medical History:   Past Medical History:   Diagnosis Date    Acne     ADHD       Past Surgical History:    Past Surgical History:   Procedure Laterality Date    NO PAST SURGERIES       Family History:    No family history on file.    Social History:    Social History     Socioeconomic History    Marital status: Single   Tobacco Use    Smoking status: Never    Smokeless tobacco: Never   Substance and Sexual Activity    Alcohol use: Yes    Drug use: Defer    Sexual activity: Defer     Allergies:   No Known Allergies    Medications:     Current Outpatient Medications:     ASHWAGANDHA PO, Take 600 mg by mouth., Disp: , Rfl:     buPROPion XL (WELLBUTRIN XL) 150 MG 24 hr tablet, Take 1 tablet by mouth Every Morning., Disp: , Rfl:     cholecalciferol (Vitamin D) 25 MCG (1000 UT) tablet, , Disp: , Rfl:     pyridoxine (CVS B6) 100 MG tablet, Take 1 tablet by mouth Daily., Disp: , Rfl:     sulfamethoxazole-trimethoprim (BACTRIM DS,SEPTRA DS) 800-160 MG per tablet, Take 1 tablet by mouth 2 (Two) Times a Day., Disp: 20 tablet, Rfl:  0    Zinc 50 MG tablet, Take  by mouth., Disp: , Rfl:     Radiology/Endoscopy:    CT chest and neck 2/24/2025  Impression:   The lesion of interest in the right supraclavicular region is visualized as an avidly enhancing ovoid soft tissue mass measuring around 5 x 3 cm. There is also relatively extensive adjacent supraclavicular adenopathy as well as enlarged pathologic   lymphadenopathy within the mediastinum and partially visualized retroperitoneum in the upper abdomen. Findings overall would favor lymphoma. Soft tissue sampling is advised and alternatively the supraclavicular mass could reflect a primary neoplasm with   remaining multistation adenopathy metastatic, with is considered less likely.     Labs:    Recent labs reviewed    Review of Systems:   As noted above in HPI    Physical Exam:   No acute distress, alert  Nonlabored respirations  Right neck/supraclavicular region with swelling and lymphadenopathy, no obvious lymphadenopathy on the right  No obvious axillary lymphadenopathy  Extremities warm and well-perfused with no gross deformities  Assessment & Plan  24-year-old male with lymphadenopathy and more focal swelling in the right supraclavicular region, concern for lymphoma.    -We discussed recommendation is for excisional biopsy for tissue diagnosis  -We discussed surgical excisional biopsy in detail; risk discussed include but are not limited to bleeding, infection, and low risk for possible vascular nerve injury  - Given diagnosis may come back as lymphoma we discussed port placement so that if a port is needed he can call the office and schedule surgery rather than come back in for additional consultation  - Risk discussed include but are not limited to bleeding, infection, vascular injury requiring further surgical intervention, pneumothorax  - Patient expressed understanding of everything discussed and is agreeable to proceed right supraclavicular mass/lymph node excisional biopsy    Cami  MD Sanchez  General Surgery    Patient or patient representative verbalized consent for the use of Ambient Listening during the visit with  Cami Bradford MD for chart documentation. 3/4/2025  20:03 EST

## 2025-03-10 ENCOUNTER — ANESTHESIA EVENT (OUTPATIENT)
Dept: PERIOP | Facility: HOSPITAL | Age: 25
End: 2025-03-10
Payer: COMMERCIAL

## 2025-03-10 ENCOUNTER — ANESTHESIA (OUTPATIENT)
Dept: PERIOP | Facility: HOSPITAL | Age: 25
End: 2025-03-10
Payer: COMMERCIAL

## 2025-03-10 ENCOUNTER — HOSPITAL ENCOUNTER (OUTPATIENT)
Facility: HOSPITAL | Age: 25
Setting detail: HOSPITAL OUTPATIENT SURGERY
Discharge: HOME | End: 2025-03-10
Attending: SURGERY | Admitting: SURGERY
Payer: COMMERCIAL

## 2025-03-10 VITALS
RESPIRATION RATE: 16 BRPM | HEART RATE: 64 BPM | DIASTOLIC BLOOD PRESSURE: 56 MMHG | SYSTOLIC BLOOD PRESSURE: 100 MMHG | HEIGHT: 72 IN | WEIGHT: 256.4 LBS | OXYGEN SATURATION: 95 % | TEMPERATURE: 98 F | BODY MASS INDEX: 34.73 KG/M2

## 2025-03-10 DIAGNOSIS — R59.1 LYMPHADENOPATHY: ICD-10-CM

## 2025-03-10 LAB — HBA1C MFR BLD: 5.5 % (ref 4.8–5.6)

## 2025-03-10 PROCEDURE — 83036 HEMOGLOBIN GLYCOSYLATED A1C: CPT | Performed by: SURGERY

## 2025-03-10 PROCEDURE — 25010000002 ONDANSETRON PER 1 MG: Performed by: NURSE ANESTHETIST, CERTIFIED REGISTERED

## 2025-03-10 PROCEDURE — 25010000002 CEFAZOLIN PER 500 MG: Performed by: SURGERY

## 2025-03-10 PROCEDURE — 25810000003 LACTATED RINGERS PER 1000 ML: Performed by: NURSE ANESTHETIST, CERTIFIED REGISTERED

## 2025-03-10 PROCEDURE — 25010000002 MIDAZOLAM PER 1 MG: Performed by: NURSE ANESTHETIST, CERTIFIED REGISTERED

## 2025-03-10 PROCEDURE — 38510 BIOPSY/REMOVAL LYMPH NODES: CPT | Performed by: SURGERY

## 2025-03-10 PROCEDURE — 25010000002 FENTANYL CITRATE (PF) 100 MCG/2ML SOLUTION: Performed by: NURSE ANESTHETIST, CERTIFIED REGISTERED

## 2025-03-10 PROCEDURE — 88305 TISSUE EXAM BY PATHOLOGIST: CPT | Performed by: SURGERY

## 2025-03-10 PROCEDURE — 25010000002 LIDOCAINE PF 2% 2 % SOLUTION: Performed by: NURSE ANESTHETIST, CERTIFIED REGISTERED

## 2025-03-10 PROCEDURE — 25010000002 PROPOFOL 200 MG/20ML EMULSION: Performed by: NURSE ANESTHETIST, CERTIFIED REGISTERED

## 2025-03-10 PROCEDURE — 25010000002 KETOROLAC TROMETHAMINE PER 15 MG: Performed by: NURSE ANESTHETIST, CERTIFIED REGISTERED

## 2025-03-10 PROCEDURE — 25010000002 LIDOCAINE 1% - EPINEPHRINE 1:100000 1 %-1:100000 SOLUTION: Performed by: SURGERY

## 2025-03-10 PROCEDURE — 25010000002 DEXAMETHASONE PER 1 MG: Performed by: NURSE ANESTHETIST, CERTIFIED REGISTERED

## 2025-03-10 PROCEDURE — 25010000002 SUGAMMADEX 200 MG/2ML SOLUTION: Performed by: NURSE ANESTHETIST, CERTIFIED REGISTERED

## 2025-03-10 DEVICE — ABSORBABLE HEMOSTAT (OXIDIZED REGENERATED CELLULOSE)
Type: IMPLANTABLE DEVICE | Site: NECK | Status: FUNCTIONAL
Brand: SURGICEL

## 2025-03-10 RX ORDER — KETOROLAC TROMETHAMINE 30 MG/ML
INJECTION, SOLUTION INTRAMUSCULAR; INTRAVENOUS AS NEEDED
Status: DISCONTINUED | OUTPATIENT
Start: 2025-03-10 | End: 2025-03-10 | Stop reason: SURG

## 2025-03-10 RX ORDER — PROMETHAZINE HYDROCHLORIDE 25 MG/1
25 TABLET ORAL ONCE AS NEEDED
Status: DISCONTINUED | OUTPATIENT
Start: 2025-03-10 | End: 2025-03-10 | Stop reason: HOSPADM

## 2025-03-10 RX ORDER — SODIUM CHLORIDE 0.9 % (FLUSH) 0.9 %
3 SYRINGE (ML) INJECTION EVERY 12 HOURS SCHEDULED
Status: DISCONTINUED | OUTPATIENT
Start: 2025-03-10 | End: 2025-03-10 | Stop reason: HOSPADM

## 2025-03-10 RX ORDER — NALOXONE HCL 0.4 MG/ML
0.2 VIAL (ML) INJECTION AS NEEDED
Status: DISCONTINUED | OUTPATIENT
Start: 2025-03-10 | End: 2025-03-10 | Stop reason: HOSPADM

## 2025-03-10 RX ORDER — LIDOCAINE HYDROCHLORIDE AND EPINEPHRINE 10; 10 MG/ML; UG/ML
INJECTION, SOLUTION INFILTRATION; PERINEURAL AS NEEDED
Status: DISCONTINUED | OUTPATIENT
Start: 2025-03-10 | End: 2025-03-10 | Stop reason: HOSPADM

## 2025-03-10 RX ORDER — ONDANSETRON 2 MG/ML
4 INJECTION INTRAMUSCULAR; INTRAVENOUS ONCE AS NEEDED
Status: DISCONTINUED | OUTPATIENT
Start: 2025-03-10 | End: 2025-03-10 | Stop reason: HOSPADM

## 2025-03-10 RX ORDER — PROCHLORPERAZINE EDISYLATE 5 MG/ML
10 INJECTION INTRAMUSCULAR; INTRAVENOUS EVERY 6 HOURS PRN
Status: DISCONTINUED | OUTPATIENT
Start: 2025-03-10 | End: 2025-03-10 | Stop reason: HOSPADM

## 2025-03-10 RX ORDER — FENTANYL CITRATE 50 UG/ML
50 INJECTION, SOLUTION INTRAMUSCULAR; INTRAVENOUS
Status: DISCONTINUED | OUTPATIENT
Start: 2025-03-10 | End: 2025-03-10 | Stop reason: HOSPADM

## 2025-03-10 RX ORDER — PROPOFOL 10 MG/ML
INJECTION, EMULSION INTRAVENOUS AS NEEDED
Status: DISCONTINUED | OUTPATIENT
Start: 2025-03-10 | End: 2025-03-10 | Stop reason: SURG

## 2025-03-10 RX ORDER — ROCURONIUM BROMIDE 10 MG/ML
INJECTION, SOLUTION INTRAVENOUS AS NEEDED
Status: DISCONTINUED | OUTPATIENT
Start: 2025-03-10 | End: 2025-03-10 | Stop reason: SURG

## 2025-03-10 RX ORDER — SODIUM CHLORIDE, SODIUM LACTATE, POTASSIUM CHLORIDE, AND CALCIUM CHLORIDE .6; .31; .03; .02 G/100ML; G/100ML; G/100ML; G/100ML
20 INJECTION, SOLUTION INTRAVENOUS ONCE
Status: COMPLETED | OUTPATIENT
Start: 2025-03-10 | End: 2025-03-10

## 2025-03-10 RX ORDER — SODIUM CHLORIDE 0.9 % (FLUSH) 0.9 %
3-10 SYRINGE (ML) INJECTION AS NEEDED
Status: DISCONTINUED | OUTPATIENT
Start: 2025-03-10 | End: 2025-03-10 | Stop reason: HOSPADM

## 2025-03-10 RX ORDER — SODIUM CHLORIDE 9 MG/ML
100 INJECTION, SOLUTION INTRAVENOUS CONTINUOUS
Status: DISCONTINUED | OUTPATIENT
Start: 2025-03-10 | End: 2025-03-10 | Stop reason: HOSPADM

## 2025-03-10 RX ORDER — SODIUM CHLORIDE, SODIUM LACTATE, POTASSIUM CHLORIDE, CALCIUM CHLORIDE 600; 310; 30; 20 MG/100ML; MG/100ML; MG/100ML; MG/100ML
INJECTION, SOLUTION INTRAVENOUS CONTINUOUS PRN
Status: DISCONTINUED | OUTPATIENT
Start: 2025-03-10 | End: 2025-03-10 | Stop reason: SURG

## 2025-03-10 RX ORDER — FLUMAZENIL 0.1 MG/ML
0.2 INJECTION INTRAVENOUS AS NEEDED
Status: DISCONTINUED | OUTPATIENT
Start: 2025-03-10 | End: 2025-03-10 | Stop reason: HOSPADM

## 2025-03-10 RX ORDER — ONDANSETRON 2 MG/ML
INJECTION INTRAMUSCULAR; INTRAVENOUS AS NEEDED
Status: DISCONTINUED | OUTPATIENT
Start: 2025-03-10 | End: 2025-03-10 | Stop reason: SURG

## 2025-03-10 RX ORDER — DIPHENHYDRAMINE HYDROCHLORIDE 50 MG/ML
12.5 INJECTION INTRAMUSCULAR; INTRAVENOUS
Status: DISCONTINUED | OUTPATIENT
Start: 2025-03-10 | End: 2025-03-10 | Stop reason: HOSPADM

## 2025-03-10 RX ORDER — MIDAZOLAM HYDROCHLORIDE 1 MG/ML
INJECTION, SOLUTION INTRAMUSCULAR; INTRAVENOUS AS NEEDED
Status: DISCONTINUED | OUTPATIENT
Start: 2025-03-10 | End: 2025-03-10 | Stop reason: SURG

## 2025-03-10 RX ORDER — DEXMEDETOMIDINE HYDROCHLORIDE 100 UG/ML
INJECTION, SOLUTION INTRAVENOUS AS NEEDED
Status: DISCONTINUED | OUTPATIENT
Start: 2025-03-10 | End: 2025-03-10 | Stop reason: SURG

## 2025-03-10 RX ORDER — FENTANYL CITRATE 50 UG/ML
INJECTION, SOLUTION INTRAMUSCULAR; INTRAVENOUS AS NEEDED
Status: DISCONTINUED | OUTPATIENT
Start: 2025-03-10 | End: 2025-03-10 | Stop reason: SURG

## 2025-03-10 RX ORDER — LIDOCAINE HYDROCHLORIDE 20 MG/ML
INJECTION, SOLUTION EPIDURAL; INFILTRATION; INTRACAUDAL; PERINEURAL AS NEEDED
Status: DISCONTINUED | OUTPATIENT
Start: 2025-03-10 | End: 2025-03-10 | Stop reason: SURG

## 2025-03-10 RX ORDER — ATROPINE SULFATE 0.4 MG/ML
0.4 INJECTION, SOLUTION INTRAMUSCULAR; INTRAVENOUS; SUBCUTANEOUS ONCE AS NEEDED
Status: DISCONTINUED | OUTPATIENT
Start: 2025-03-10 | End: 2025-03-10 | Stop reason: HOSPADM

## 2025-03-10 RX ORDER — PROMETHAZINE HYDROCHLORIDE 25 MG/1
25 SUPPOSITORY RECTAL ONCE AS NEEDED
Status: DISCONTINUED | OUTPATIENT
Start: 2025-03-10 | End: 2025-03-10 | Stop reason: HOSPADM

## 2025-03-10 RX ORDER — IPRATROPIUM BROMIDE AND ALBUTEROL SULFATE 2.5; .5 MG/3ML; MG/3ML
3 SOLUTION RESPIRATORY (INHALATION) ONCE AS NEEDED
Status: DISCONTINUED | OUTPATIENT
Start: 2025-03-10 | End: 2025-03-10 | Stop reason: HOSPADM

## 2025-03-10 RX ORDER — SODIUM CHLORIDE 9 MG/ML
40 INJECTION, SOLUTION INTRAVENOUS AS NEEDED
Status: DISCONTINUED | OUTPATIENT
Start: 2025-03-10 | End: 2025-03-10 | Stop reason: HOSPADM

## 2025-03-10 RX ORDER — DEXAMETHASONE SODIUM PHOSPHATE 4 MG/ML
INJECTION, SOLUTION INTRA-ARTICULAR; INTRALESIONAL; INTRAMUSCULAR; INTRAVENOUS; SOFT TISSUE AS NEEDED
Status: DISCONTINUED | OUTPATIENT
Start: 2025-03-10 | End: 2025-03-10 | Stop reason: SURG

## 2025-03-10 RX ADMIN — SUGAMMADEX 200 MG: 100 INJECTION, SOLUTION INTRAVENOUS at 13:34

## 2025-03-10 RX ADMIN — CEFAZOLIN 2000 MG: 2 INJECTION, POWDER, FOR SOLUTION INTRAMUSCULAR; INTRAVENOUS at 12:31

## 2025-03-10 RX ADMIN — SODIUM CHLORIDE, SODIUM LACTATE, POTASSIUM CHLORIDE, AND CALCIUM CHLORIDE 20 ML/HR: 600; 310; 30; 20 INJECTION, SOLUTION INTRAVENOUS at 12:31

## 2025-03-10 RX ADMIN — FENTANYL CITRATE 50 MCG: 50 INJECTION, SOLUTION INTRAMUSCULAR; INTRAVENOUS at 12:42

## 2025-03-10 RX ADMIN — ROCURONIUM BROMIDE 40 MG: 10 INJECTION, SOLUTION INTRAVENOUS at 12:42

## 2025-03-10 RX ADMIN — PROPOFOL 200 MG: 10 INJECTION, EMULSION INTRAVENOUS at 12:42

## 2025-03-10 RX ADMIN — DEXAMETHASONE SODIUM PHOSPHATE 8 MG: 4 INJECTION, SOLUTION INTRAMUSCULAR; INTRAVENOUS at 12:52

## 2025-03-10 RX ADMIN — DEXMEDETOMIDINE HYDROCHLORIDE 10 MCG: 100 INJECTION, SOLUTION INTRAVENOUS at 12:48

## 2025-03-10 RX ADMIN — FENTANYL CITRATE 50 MCG: 50 INJECTION, SOLUTION INTRAMUSCULAR; INTRAVENOUS at 12:51

## 2025-03-10 RX ADMIN — KETOROLAC TROMETHAMINE 30 MG: 30 INJECTION, SOLUTION INTRAMUSCULAR at 13:27

## 2025-03-10 RX ADMIN — SODIUM CHLORIDE, SODIUM LACTATE, POTASSIUM CHLORIDE, AND CALCIUM CHLORIDE: .6; .31; .03; .02 INJECTION, SOLUTION INTRAVENOUS at 12:36

## 2025-03-10 RX ADMIN — ONDANSETRON 4 MG: 2 INJECTION INTRAMUSCULAR; INTRAVENOUS at 13:27

## 2025-03-10 RX ADMIN — MIDAZOLAM 2 MG: 1 INJECTION INTRAMUSCULAR; INTRAVENOUS at 12:38

## 2025-03-10 RX ADMIN — LIDOCAINE HYDROCHLORIDE 50 MG: 20 INJECTION, SOLUTION EPIDURAL; INFILTRATION; INTRACAUDAL; PERINEURAL at 12:42

## 2025-03-10 NOTE — ANESTHESIA PROCEDURE NOTES
Airway  Reason: elective    Date/Time: 3/10/2025 12:42 PM    General Information and Staff    Patient location during procedure: OR  CRNA/CAA: Jem Sadler CRNA    Indications and Patient Condition  Indications for airway management: airway protection    Preoxygenated: yes  MILS maintained throughout    Mask difficulty assessment: 1 - vent by mask    Final Airway Details    Final airway type: endotracheal airway      Successful airway: ETT  Cuffed: yes   Successful intubation technique: video laryngoscopy  Endotracheal tube insertion site: oral  Blade: Kay  Blade size: 3  ETT size (mm): 7.0  Cormack-Lehane Classification: grade I - full view of glottis  Placement verified by: chest auscultation   Measured from: teeth  ETT/EBT  to teeth (cm): 22  Number of attempts at approach: 1  Assessment: lips, teeth, and gum same as pre-op and atraumatic intubation

## 2025-03-10 NOTE — ANESTHESIA PREPROCEDURE EVALUATION
Anesthesia Evaluation     Patient summary reviewed and Nursing notes reviewed   NPO Solid Status: > 8 hours  NPO Liquid Status: > 8 hours           Airway   Mallampati: II  Dental      Pulmonary    Cardiovascular         Neuro/Psych  GI/Hepatic/Renal/Endo      Musculoskeletal     Abdominal    Substance History      OB/GYN          Other        ROS/Med Hx Other: Adenopathy suggestive lymphoma, planned Rt cervical lymph node excisional bx              Anesthesia Plan    ASA 3     general     intravenous induction     Anesthetic plan, risks, benefits, and alternatives have been provided, discussed and informed consent has been obtained with: patient.    Plan discussed with CRNA.    CODE STATUS:

## 2025-03-10 NOTE — DISCHARGE INSTRUCTIONS
Keep incisions clean and dry for the first 24 to 48 hours then may shower soap and water over incision but no soaking in water  Over-the-counter Tylenol and ibuprofen as needed for pain control  May use ice to the area for pain control  Follow-up in the office in 2 weeks

## 2025-03-11 ENCOUNTER — PATIENT ROUNDING (BHMG ONLY) (OUTPATIENT)
Dept: SURGERY | Facility: CLINIC | Age: 25
End: 2025-03-11
Payer: COMMERCIAL

## 2025-03-11 LAB — Lab: NORMAL

## 2025-03-11 NOTE — PROGRESS NOTES
March 11, 2025    Hello, may I speak with Johnny Miller?    My name is tabatha      I am  with [facility]  [address] [place] [address].    Before we get started may I verify your date of birth? 2000    I am calling to officially welcome you to our practice and ask about your recent visit. Is this a good time to talk? yes    Tell me about your visit with us. What things went well?  it was all fine. He   had some issues with the discharge at the hospital. He states that can be improved on.        We're always looking for ways to make our patients' experiences even better. Do you have recommendations on ways we may improve?  no    Overall were you satisfied with your first visit to our practice? yes       I appreciate you taking the time to speak with me today. Is there anything else I can do for you? no      Thank you, and have a great day.

## 2025-03-11 NOTE — ANESTHESIA POSTPROCEDURE EVALUATION
Patient: Johnny Miller    Procedure Summary       Date: 03/10/25 Room / Location: [facility] OR 08 / [facility] MAIN OR    Anesthesia Start: 1236 Anesthesia Stop: 1352    Procedure: Right cervical mass/lymph node excisional biopsy (Right: Neck) Diagnosis:       Lymphadenopathy      (Lymphadenopathy [R59.1])    Surgeons: Cami Bradford MD Provider: Deidra Torres MD    Anesthesia Type: general ASA Status: 3            Anesthesia Type: general    Vitals  Vitals Value Taken Time   /63 03/10/25 14:45   Temp 97.8 °F (36.6 °C) 03/10/25 14:45   Pulse 64 03/10/25 14:48   Resp 15 03/10/25 14:45   SpO2 95 % 03/10/25 14:48   Vitals shown include unfiled device data.        Post Anesthesia Care and Evaluation    Patient location during evaluation: PACU  Patient participation: complete - patient participated  Level of consciousness: awake and alert  Pain management: satisfactory to patient    Airway patency: patent  Anesthetic complications: No anesthetic complications  PONV Status: none  Cardiovascular status: acceptable  Respiratory status: acceptable  Hydration status: acceptable

## 2025-03-11 NOTE — OP NOTE
Operative Report    Patient Name:  Johnny Miller  YOB: 2000    Date of Surgery:  3/10/2025    Pre-op Diagnosis:   Lymphadenopathy [R59.1]       Post-op Diagnosis:   Lymphadenopathy [R59.1]    Procedure(s):  Right cervical lymph node excisional biopsy    Staff:  Surgeon(s):  Cami Bradford MD    Circulator: Flora Villatoro RN; Christopher Sena RN  Scrub Person: Abby Hanson RN    Anesthesia: General    Estimated Blood Loss: minimal    Implants:   None    Specimen:          Specimens       ID Source Type Tests Collected By Collected At Frozen?    A Neck Tissue TISSUE PATHOLOGY EXAM  FLOW CYTOMETRY (INTEGRATED ONCOLOGY)   Cami Bradford MD 3/10/25 7931 No    Description: lymphoma rule out, right cervical mass/lymph node          Findings: Fairly extensive lymphadenopathy in the right supraclavicular region, too large to remove entire lymph node.    Complications: None immediate    CLINICAL INDICATIONS:  The patient is a 24 year old male with lymphadenopathy and extensive lymphadenopathy of the right supraclavicular region concerning for possible lymphoma.  He was seen in the clinic for evaluation and excisional lymph node biopsy was recommended and offered.  The patient presents today for surgery.  The surgery along with the risks, benefits, and alternatives to surgery were discussed.  The patient verbalized understanding and wished to proceed with surgery.  Informed consent was obtained.    DESCRIPTION OF PROCEDURE:  The patient was brought to the operating room and placed in the supine position with both arms out.  Patient was induced and intubated by the anesthesia team.  A shoulder roll was placed to bring the lymph nodes more anteriorly and the patient's right arm was tucked.  The patient's right neck region was then prepped and draped in the usual sterile fashion.  A time out was performed.    We began infiltrating the skin and subcutaneous tissues overlying the enlarged lymph nodes with local  anesthetic.  A transverse incision was made overlying the mass.  The subcutaneous tissues were dissected with electrocautery dissecting down to the platysma.  The platysma was opened with electrocautery.  We then bluntly split and retracted the overlying muscles and were able to palpate the underlying lymph node.  The lymph node was fairly large and matted to other lymph nodes.  We freed the lymph node from surrounding tissues with a combination of blunt dissection and electrocautery and it became clear that the entire lymph node could not be excised without potentially causing bleeding or other complications so at this point I elected to excise a piece of lymph node approximately 1 cm x 1 cm x 1 cm.  This was done with both a combination of electrocautery as well as sharp dissection.  The cut edge of the lymph node was then cauterized to control bleeding.  The area was irrigated and was adequately hemostatic.  We placed a small amount of Surgicel over the cut edge of the lymph node to decrease the risk of postoperative hematoma.  We then reapproximated muscles with 3-0 Vicryl suture in an interrupted fashion.  Additional local anesthetic was infiltrated into the area.  The skin was reapproximated interrupted deep dermal stitches using 3-0 Vicryl suture.  The skin was then reapproximated with 4-0 Vicryl in a running subcuticular fashion.  The area was cleaned and skin glue was applied.    The patient tolerated the procedure well.  He was awakened by anesthesia and transferred to the recovery room in stable condition.  At the completion of the case, all instrument, needle, and sponge counts were correct.         Cami Bradford MD     Date: 3/11/2025  Time: 08:41 EDT    This note was created using Dragon Voice Recognition software.

## 2025-03-12 ENCOUNTER — TELEPHONE (OUTPATIENT)
Dept: SURGERY | Facility: CLINIC | Age: 25
End: 2025-03-12
Payer: COMMERCIAL

## 2025-03-12 NOTE — TELEPHONE ENCOUNTER
Call placed to patient to follow up after surgery, reports doing well. Discussed follow up appointment and encouraged to call with any questions. Patient expressed understanding of all discussed.

## 2025-03-18 LAB
LAB AP CASE REPORT: NORMAL
LAB AP DIAGNOSIS COMMENT: NORMAL
LAB AP FLOW CYTOMETRY SUMMARY: NORMAL
PATH REPORT.FINAL DX SPEC: NORMAL
PATH REPORT.GROSS SPEC: NORMAL

## 2025-03-19 ENCOUNTER — OFFICE VISIT (OUTPATIENT)
Dept: ONCOLOGY | Facility: CLINIC | Age: 25
End: 2025-03-19
Payer: COMMERCIAL

## 2025-03-19 ENCOUNTER — LAB (OUTPATIENT)
Dept: LAB | Facility: HOSPITAL | Age: 25
End: 2025-03-19
Payer: COMMERCIAL

## 2025-03-19 VITALS
BODY MASS INDEX: 35.49 KG/M2 | HEIGHT: 72 IN | SYSTOLIC BLOOD PRESSURE: 122 MMHG | TEMPERATURE: 98.1 F | HEART RATE: 66 BPM | RESPIRATION RATE: 20 BRPM | OXYGEN SATURATION: 98 % | WEIGHT: 262 LBS | DIASTOLIC BLOOD PRESSURE: 79 MMHG

## 2025-03-19 DIAGNOSIS — C81.71 OTHER CLASSICAL HODGKIN LYMPHOMA OF LYMPH NODES OF NECK: ICD-10-CM

## 2025-03-19 DIAGNOSIS — R22.2 SUPRACLAVICULAR MASS: Primary | ICD-10-CM

## 2025-03-19 LAB
ALBUMIN SERPL-MCNC: 4.4 G/DL (ref 3.5–5.2)
ALP SERPL-CCNC: 99 U/L (ref 39–117)
ALT SERPL W P-5'-P-CCNC: 62 U/L (ref 1–41)
AST SERPL-CCNC: 32 U/L (ref 1–40)
BASOPHILS # BLD AUTO: 0.02 10*3/MM3 (ref 0–0.2)
BASOPHILS NFR BLD AUTO: 0.3 % (ref 0–1.5)
BILIRUB CONJ SERPL-MCNC: 0.1 MG/DL (ref 0–0.3)
BILIRUB INDIRECT SERPL-MCNC: 0.1 MG/DL
BILIRUB SERPL-MCNC: 0.2 MG/DL (ref 0–1.2)
DEPRECATED RDW RBC AUTO: 41.2 FL (ref 37–54)
EOSINOPHIL # BLD AUTO: 0.08 10*3/MM3 (ref 0–0.4)
EOSINOPHIL NFR BLD AUTO: 1.1 % (ref 0.3–6.2)
ERYTHROCYTE [DISTWIDTH] IN BLOOD BY AUTOMATED COUNT: 13 % (ref 12.3–15.4)
ERYTHROCYTE [SEDIMENTATION RATE] IN BLOOD: 27 MM/HR (ref 0–15)
HCT VFR BLD AUTO: 43.8 % (ref 37.5–51)
HGB BLD-MCNC: 14.4 G/DL (ref 13–17.7)
HIV 1+2 AB+HIV1 P24 AG SERPL QL IA: NORMAL
HOLD SPECIMEN: NORMAL
HOLD SPECIMEN: NORMAL
LDH SERPL-CCNC: 195 U/L (ref 135–225)
LYMPHOCYTES # BLD AUTO: 2.38 10*3/MM3 (ref 0.7–3.1)
LYMPHOCYTES NFR BLD AUTO: 33.9 % (ref 19.6–45.3)
MCH RBC QN AUTO: 29 PG (ref 26.6–33)
MCHC RBC AUTO-ENTMCNC: 32.9 G/DL (ref 31.5–35.7)
MCV RBC AUTO: 88.1 FL (ref 79–97)
MONOCYTES # BLD AUTO: 0.56 10*3/MM3 (ref 0.1–0.9)
MONOCYTES NFR BLD AUTO: 8 % (ref 5–12)
NEUTROPHILS NFR BLD AUTO: 3.99 10*3/MM3 (ref 1.7–7)
NEUTROPHILS NFR BLD AUTO: 56.7 % (ref 42.7–76)
PLATELET # BLD AUTO: 268 10*3/MM3 (ref 140–450)
PMV BLD AUTO: 9.9 FL (ref 6–12)
PROT SERPL-MCNC: 7.4 G/DL (ref 6–8.5)
RBC # BLD AUTO: 4.97 10*6/MM3 (ref 4.14–5.8)
URATE SERPL-MCNC: 6 MG/DL (ref 3.4–7)
WBC NRBC COR # BLD AUTO: 7.03 10*3/MM3 (ref 3.4–10.8)

## 2025-03-19 PROCEDURE — 85025 COMPLETE CBC W/AUTO DIFF WBC: CPT

## 2025-03-19 PROCEDURE — 84550 ASSAY OF BLOOD/URIC ACID: CPT | Performed by: INTERNAL MEDICINE

## 2025-03-19 PROCEDURE — G0432 EIA HIV-1/HIV-2 SCREEN: HCPCS | Performed by: INTERNAL MEDICINE

## 2025-03-19 PROCEDURE — 85652 RBC SED RATE AUTOMATED: CPT | Performed by: INTERNAL MEDICINE

## 2025-03-19 PROCEDURE — 36415 COLL VENOUS BLD VENIPUNCTURE: CPT

## 2025-03-19 PROCEDURE — 80076 HEPATIC FUNCTION PANEL: CPT | Performed by: INTERNAL MEDICINE

## 2025-03-19 PROCEDURE — 83615 LACTATE (LD) (LDH) ENZYME: CPT | Performed by: INTERNAL MEDICINE

## 2025-03-19 NOTE — PROGRESS NOTES
Hematology/Oncology Outpatient Follow Up    PATIENT NAME:Johnny Miller  :2000  MRN: [id number]  PRIMARY CARE PHYSICIAN: Graciela Cotter APRN  REFERRING PHYSICIAN: No ref. provider found    Chief Complaint   Patient presents with    Follow-up     Class Hodgkin lymphoma        HISTORY OF PRESENT ILLNESS:     24-year-old male who has been referred secondary to right supraclavicular mass.  Patient felt a lump on the right neck over the past 4 months.  At the time he noticed it was around the time he had COVID-19 infection in 2024.  He denies night sweats weight loss or fatigue symptoms.  Patient does not smoke.  He drinks only 1 beer per week.  As far as he knows there is no family history of cancer.  States that the mass on the right neck is not painful.     For that reason patient had an ultrasound of the neck completed in 2025 which basically revealed a 3.9 cm well-circumscribed mass right supraclavicular area suspicious characteristics.    For that reason he has been referred to us for further evaluation and management        Patient is alone today for this appointment.    2024: patient had CT scan of the neck and chest with basically revealed right supraclavicular mass measuring approximately 5 x 3 cm enlarged pathologic lymphadenopathy within the mediastinum partially visualized upper retroperitoneal area.  The above findings suggest lymphoproliferative disease  Past Medical History:   Diagnosis Date    Acne     ADHD        Past Surgical History:   Procedure Laterality Date    CERVICAL LYMPH NODE BIOPSY/EXCISION Right 3/10/2025    Procedure: Right cervical mass/lymph node excisional biopsy;  Surgeon: Cami Bradford MD;  Location: [facility] MAIN OR;  Service: General;  Laterality: Right;    NO PAST SURGERIES           Current Outpatient Medications:     ASHWAGANDHA PO, Take 600 mg by mouth., Disp: , Rfl:     buPROPion XL (WELLBUTRIN XL) 150 MG 24 hr tablet, Take 1 tablet by mouth Every  Morning., Disp: , Rfl:     cholecalciferol (Vitamin D) 25 MCG (1000 UT) tablet, , Disp: , Rfl:     melatonin 5 MG tablet tablet, Take  by mouth., Disp: , Rfl:     pyridoxine ([employer] B6) 100 MG tablet, Take 1 tablet by mouth Daily., Disp: , Rfl:     Zinc 50 MG tablet, Take  by mouth., Disp: , Rfl:     No Known Allergies    Family History   Problem Relation Age of Onset    Alcohol abuse Father     Diabetes Father        Cancer-related family history is not on file.    Social History     Tobacco Use    Smoking status: Never    Smokeless tobacco: Never   Vaping Use    Vaping status: Never Used   Substance Use Topics    Alcohol use: Yes     Alcohol/week: 2.0 standard drinks of alcohol     Types: 2 Cans of beer per week    Drug use: Never       I have reviewed and confirmed the accuracy of the patient's history: Chief complaint, HPI, ROS, and Subjective as entered by the MA/LPN/RN. Alethea Mehta MD 03/19/25      SUBJECTIVE:     He denies B symptoms    REVIEW OF SYSTEMS:  Review of Systems   Constitutional:  Negative for chills, fatigue and fever.   HENT:  Negative for congestion, drooling, ear discharge, rhinorrhea, sinus pressure and tinnitus.    Eyes:  Negative for photophobia, pain and discharge.   Respiratory:  Negative for apnea, choking and stridor.    Cardiovascular:  Negative for palpitations.   Gastrointestinal:  Negative for abdominal distention, abdominal pain and anal bleeding.   Endocrine: Negative for polydipsia and polyphagia.   Genitourinary:  Negative for decreased urine volume, flank pain and genital sores.   Musculoskeletal:  Negative for gait problem, neck pain and neck stiffness.   Skin:  Negative for color change, rash and wound.   Neurological:  Negative for tremors, seizures, syncope, facial asymmetry and speech difficulty.   Hematological:  Negative for adenopathy.   Psychiatric/Behavioral:  Negative for agitation, confusion, hallucinations and self-injury. The patient is not hyperactive.   "      OBJECTIVE:    Vitals:    03/19/25 1512   BP: 122/79   Pulse: 66   Resp: 20   Temp: 98.1 °F (36.7 °C)   TempSrc: Infrared   SpO2: 98%   Weight: 119 kg (262 lb)   Height: 182.9 cm (72\")   PainSc: 2    PainLoc: Generalized  Comment: surgical incision     Body mass index is 35.53 kg/m².    ECOG  (0) Fully active, able to carry on all predisease performance without restriction    Physical Exam    Right Supraclavicular zayra enlargement    RECENT LABS  WBC   Date Value Ref Range Status   03/19/2025 7.03 3.40 - 10.80 10*3/mm3 Final     RBC   Date Value Ref Range Status   03/19/2025 4.97 4.14 - 5.80 10*6/mm3 Final     Hemoglobin   Date Value Ref Range Status   03/19/2025 14.4 13.0 - 17.7 g/dL Final     Hematocrit   Date Value Ref Range Status   03/19/2025 43.8 37.5 - 51.0 % Final     MCV   Date Value Ref Range Status   03/19/2025 88.1 79.0 - 97.0 fL Final     MCH   Date Value Ref Range Status   03/19/2025 29.0 26.6 - 33.0 pg Final     MCHC   Date Value Ref Range Status   03/19/2025 32.9 31.5 - 35.7 g/dL Final     RDW   Date Value Ref Range Status   03/19/2025 13.0 12.3 - 15.4 % Final     RDW-SD   Date Value Ref Range Status   03/19/2025 41.2 37.0 - 54.0 fl Final     MPV   Date Value Ref Range Status   03/19/2025 9.9 6.0 - 12.0 fL Final     Platelets   Date Value Ref Range Status   03/19/2025 268 140 - 450 10*3/mm3 Final     Neutrophil %   Date Value Ref Range Status   03/19/2025 56.7 42.7 - 76.0 % Final     Lymphocyte %   Date Value Ref Range Status   03/19/2025 33.9 19.6 - 45.3 % Final     Monocyte %   Date Value Ref Range Status   03/19/2025 8.0 5.0 - 12.0 % Final     Eosinophil %   Date Value Ref Range Status   03/19/2025 1.1 0.3 - 6.2 % Final     Basophil %   Date Value Ref Range Status   03/19/2025 0.3 0.0 - 1.5 % Final     Neutrophils, Absolute   Date Value Ref Range Status   03/19/2025 3.99 1.70 - 7.00 10*3/mm3 Final     Lymphocytes, Absolute   Date Value Ref Range Status   03/19/2025 2.38 0.70 - 3.10 10*3/mm3 " Final     Monocytes, Absolute   Date Value Ref Range Status   03/19/2025 0.56 0.10 - 0.90 10*3/mm3 Final     Eosinophils, Absolute   Date Value Ref Range Status   03/19/2025 0.08 0.00 - 0.40 10*3/mm3 Final     Basophils, Absolute   Date Value Ref Range Status   03/19/2025 0.02 0.00 - 0.20 10*3/mm3 Final     nRBC   Date Value Ref Range Status   02/05/2021 0.0 0.0 - 0.2 /100 WBC Final       Lab Results   Component Value Date    GLUCOSE 112 (H) 02/17/2025    BUN 14 02/17/2025    CREATININE 0.90 02/17/2025    EGFRIFNONA 82 02/05/2021    BCR 15.6 02/17/2025    K 4.1 02/17/2025    CO2 25.9 02/17/2025    CALCIUM 9.4 02/17/2025    ALBUMIN 4.4 03/19/2025    AST 32 03/19/2025    ALT 62 (H) 03/19/2025         Assessment & Plan     Supraclavicular mass  - CBC & Differential  - Sedimentation Rate  - HIV-1 & HIV-2 Antibodies  - Hepatic Function Panel  - Ambulatory Referral to Pulmonology  - Adult Transthoracic Echo Complete W/ Cont if Necessary Per Protocol  - Ambulatory Referral to General Surgery  - Lactate Dehydrogenase  - Uric Acid    Other classical Hodgkin lymphoma of lymph nodes of neck  - Sedimentation Rate  - HIV-1 & HIV-2 Antibodies  - Hepatic Function Panel  - Ambulatory Referral to Pulmonology  - Adult Transthoracic Echo Complete W/ Cont if Necessary Per Protocol  - Ambulatory Referral to General Surgery  - Lactate Dehydrogenase  - Uric Acid      ASSESSMENT:    Supraclavicular mass  - CBC & Differential        Classical Hodgkin's lymphoma nodular sclerosis status post biopsy of right supraclavicular mass.  No B symptoms  Fertility preservation discussion  Patient is a Non-smoker       Discussion    Reviewed diagnosis of Hodgkin's lymphoma  Discussed that this is a curable disease  Discussed the need for complete staging  Discussed that there is significant role of chemotherapy  Reviewed ABVD regimen in detail    I reviewed the benefits the side effects  Chemotherapy side effects include, but not limited to, nausea,  vomiting, bone marrow suppression, which can result in blood, platelet transfusion. There is also risk of permanent bone marrow destruction, which can cause myelodysplastic syndrome or leukemia years down the line. There is risk of infection which can result in hospitalization and even death. There is also risk of fatigue, asthenia, alopecia which could become permanent. Chemo will help to reduce risk of relapse of cancer, but does not eliminate risk completely.     Reviewed that bleomycin can lead to pulmonary toxicity  Adriamycin can cause cardiotoxicity  Discussed           Plans         Sed rate, HIV testing, viral hepatitis panel, pulmonary function tests including DLCO assessment, 2D echo prior to chemotherapy, PET CT scan for Hodgkin's lymphoma staging, Dr. Bradford to place port for chemotherapy, LDH uric acid level, Coleen can you call [facility] for sperm banking recomendations on where to send him for it, lets have this test done as quickly as possible. Will plan to see him again on March 31st .     Plan for chemotherapy to begin that week.       ABVD regimen was discussed        I spent 40 total minutes, face-to-face, caring for Johnny today. 90% of this time involved counseling and/or coordination of care as documented within this note.       Electronically signed by Alethea Mehta MD, 03/19/25, 6:30 PM EDT.            History & physical (H&P) including: B symptoms (unexplained fever   >38°C; drenching night sweats; or weight loss >10% of body weight   within 6 mo of diagnosis), alcohol intolerance, pruritus, fatigue,   performance status, and examination of lymphoid regions, spleen,   and liver   Complete blood count (CBC), differential   Erythrocyte sedimentation rate (ESR)   Comprehensive metabolic panel, lactate dehydrogenase (LDH), and   liver function test (LFT)   Human immunodeficiency virus (HIV) testing (See NCCN Guidelines   for Cancer in People with HIV)   Pregnancy test for those of  childbearing potential prior to cytotoxic   chemotherapy or radiation therapy (RT)   FDG-PET/CT scan (skull base to mid-thigh or vertex to feet in   selected cases)c,d   Counseling: Fertility/psychosociale and smoking cessation (See   NCCN Guidelines for Smoking Cessation)   Useful in selected cases:   Fertility preservatione,f   Pulmonary function tests ([PFTs] including diffusing capacity of the   lung for carbon monoxide [DLCO])g if ABVDh,i is being used   Hepatitis B/C testing (encouraged)   Diagnostic CTj (contrast-enhanced)    Chest x-ray (encouraged, especially if large mediastinal mass)   Adequate bone marrow biopsy if there are unexplained cytopenias   other than anemia and negative FDG-PETk   Echocardiogram or multigated acquisition (MUGA) scan and   consideration of atorvastatin1 if anthracycline-based chemotherapy   is indicated   MRI of select sites, with contrast unless contraindicated

## 2025-03-20 DIAGNOSIS — C81.90 HODGKIN LYMPHOMA, UNSPECIFIED HODGKIN LYMPHOMA TYPE, UNSPECIFIED BODY REGION: Primary | ICD-10-CM

## 2025-03-20 RX ORDER — SODIUM CHLORIDE 0.9 % (FLUSH) 0.9 %
3 SYRINGE (ML) INJECTION EVERY 12 HOURS SCHEDULED
OUTPATIENT
Start: 2025-03-25

## 2025-03-20 RX ORDER — SODIUM CHLORIDE 9 MG/ML
100 INJECTION, SOLUTION INTRAVENOUS CONTINUOUS
OUTPATIENT
Start: 2025-03-25 | End: 2025-03-26

## 2025-03-20 RX ORDER — SODIUM CHLORIDE 9 MG/ML
40 INJECTION, SOLUTION INTRAVENOUS AS NEEDED
OUTPATIENT
Start: 2025-03-25

## 2025-03-20 RX ORDER — SODIUM CHLORIDE 0.9 % (FLUSH) 0.9 %
3-10 SYRINGE (ML) INJECTION AS NEEDED
OUTPATIENT
Start: 2025-03-25

## 2025-03-25 ENCOUNTER — HOSPITAL ENCOUNTER (OUTPATIENT)
Facility: HOSPITAL | Age: 25
Setting detail: HOSPITAL OUTPATIENT SURGERY
Discharge: HOME | End: 2025-03-25
Attending: SURGERY | Admitting: SURGERY
Payer: COMMERCIAL

## 2025-03-25 ENCOUNTER — ANESTHESIA EVENT (OUTPATIENT)
Dept: PERIOP | Facility: HOSPITAL | Age: 25
End: 2025-03-25
Payer: COMMERCIAL

## 2025-03-25 ENCOUNTER — HOSPITAL ENCOUNTER (OUTPATIENT)
Dept: CARDIOLOGY | Facility: HOSPITAL | Age: 25
Discharge: HOME | End: 2025-03-25
Admitting: INTERNAL MEDICINE
Payer: COMMERCIAL

## 2025-03-25 ENCOUNTER — ANESTHESIA (OUTPATIENT)
Dept: PERIOP | Facility: HOSPITAL | Age: 25
End: 2025-03-25
Payer: COMMERCIAL

## 2025-03-25 ENCOUNTER — APPOINTMENT (OUTPATIENT)
Dept: GENERAL RADIOLOGY | Facility: HOSPITAL | Age: 25
End: 2025-03-25
Payer: COMMERCIAL

## 2025-03-25 VITALS
SYSTOLIC BLOOD PRESSURE: 122 MMHG | OXYGEN SATURATION: 94 % | WEIGHT: 262 LBS | BODY MASS INDEX: 35.49 KG/M2 | HEART RATE: 82 BPM | TEMPERATURE: 97.6 F | HEIGHT: 72 IN | RESPIRATION RATE: 18 BRPM | DIASTOLIC BLOOD PRESSURE: 72 MMHG

## 2025-03-25 VITALS
HEART RATE: 74 BPM | HEIGHT: 72 IN | DIASTOLIC BLOOD PRESSURE: 71 MMHG | BODY MASS INDEX: 35.49 KG/M2 | SYSTOLIC BLOOD PRESSURE: 110 MMHG | WEIGHT: 262 LBS

## 2025-03-25 DIAGNOSIS — C81.71 OTHER CLASSICAL HODGKIN LYMPHOMA OF LYMPH NODES OF NECK: ICD-10-CM

## 2025-03-25 DIAGNOSIS — C81.90 HODGKIN LYMPHOMA, UNSPECIFIED HODGKIN LYMPHOMA TYPE, UNSPECIFIED BODY REGION: ICD-10-CM

## 2025-03-25 DIAGNOSIS — R22.2 SUPRACLAVICULAR MASS: ICD-10-CM

## 2025-03-25 LAB
AORTIC DIMENSIONLESS INDEX: 0.67 (DI)
AV MEAN PRESS GRAD SYS DOP V1V2: 4 MMHG
AV VMAX SYS DOP: 138 CM/SEC
BH CV ECHO LEFT VENTRICLE GLOBAL LONGITUDINAL STRAIN: -16.7 %
BH CV ECHO MEAS - ACS: 2.2 CM
BH CV ECHO MEAS - AO MAX PG: 7.6 MMHG
BH CV ECHO MEAS - AO ROOT DIAM: 3.1 CM
BH CV ECHO MEAS - AO V2 VTI: 27.5 CM
BH CV ECHO MEAS - AVA(I,D): 2.39 CM2
BH CV ECHO MEAS - EDV(CUBED): 77.1 ML
BH CV ECHO MEAS - EDV(MOD-SP4): 72.6 ML
BH CV ECHO MEAS - EF(MOD-SP4): 63.4 %
BH CV ECHO MEAS - ESV(CUBED): 19.4 ML
BH CV ECHO MEAS - ESV(MOD-SP4): 26.6 ML
BH CV ECHO MEAS - FS: 36.9 %
BH CV ECHO MEAS - IVS/LVPW: 0.97 CM
BH CV ECHO MEAS - IVSD: 0.96 CM
BH CV ECHO MEAS - LA DIMENSION: 3.4 CM
BH CV ECHO MEAS - LAT PEAK E' VEL: 18.7 CM/SEC
BH CV ECHO MEAS - LV DIASTOLIC VOL/BSA (35-75): 30.4 CM2
BH CV ECHO MEAS - LV MASS(C)D: 136.6 GRAMS
BH CV ECHO MEAS - LV MAX PG: 3.9 MMHG
BH CV ECHO MEAS - LV MEAN PG: 2.28 MMHG
BH CV ECHO MEAS - LV SYSTOLIC VOL/BSA (12-30): 11.1 CM2
BH CV ECHO MEAS - LV V1 MAX: 99.2 CM/SEC
BH CV ECHO MEAS - LV V1 VTI: 18.5 CM
BH CV ECHO MEAS - LVIDD: 4.3 CM
BH CV ECHO MEAS - LVIDS: 2.7 CM
BH CV ECHO MEAS - LVOT AREA: 3.6 CM2
BH CV ECHO MEAS - LVOT DIAM: 2.13 CM
BH CV ECHO MEAS - LVPWD: 1 CM
BH CV ECHO MEAS - MED PEAK E' VEL: 11.1 CM/SEC
BH CV ECHO MEAS - MV A MAX VEL: 56.9 CM/SEC
BH CV ECHO MEAS - MV DEC SLOPE: 544 CM/SEC2
BH CV ECHO MEAS - MV DEC TIME: 0.15 SEC
BH CV ECHO MEAS - MV E MAX VEL: 79.8 CM/SEC
BH CV ECHO MEAS - MV E/A: 1.4
BH CV ECHO MEAS - MV MAX PG: 3 MMHG
BH CV ECHO MEAS - MV MEAN PG: 1.12 MMHG
BH CV ECHO MEAS - MV V2 VTI: 22.7 CM
BH CV ECHO MEAS - MVA(VTI): 2.9 CM2
BH CV ECHO MEAS - PA ACC TIME: 0.09 SEC
BH CV ECHO MEAS - PA V2 MAX: 92.3 CM/SEC
BH CV ECHO MEAS - PULM A REVS DUR: 0.13 SEC
BH CV ECHO MEAS - PULM A REVS VEL: 24 CM/SEC
BH CV ECHO MEAS - PULM DIAS VEL: 38 CM/SEC
BH CV ECHO MEAS - PULM S/D: 1.08
BH CV ECHO MEAS - PULM SYS VEL: 41.1 CM/SEC
BH CV ECHO MEAS - QP/QS: 1.21
BH CV ECHO MEAS - RAP SYSTOLE: 3 MMHG
BH CV ECHO MEAS - RV MAX PG: 1.84 MMHG
BH CV ECHO MEAS - RV V1 MAX: 67.9 CM/SEC
BH CV ECHO MEAS - RV V1 VTI: 14.8 CM
BH CV ECHO MEAS - RVDD: 2.5 CM
BH CV ECHO MEAS - RVOT DIAM: 2.6 CM
BH CV ECHO MEAS - RVSP: 22 MMHG
BH CV ECHO MEAS - SV(LVOT): 65.7 ML
BH CV ECHO MEAS - SV(MOD-SP4): 46 ML
BH CV ECHO MEAS - SV(RVOT): 79.6 ML
BH CV ECHO MEAS - SVI(LVOT): 27.5 ML/M2
BH CV ECHO MEAS - SVI(MOD-SP4): 19.3 ML/M2
BH CV ECHO MEAS - TAPSE (>1.6): 2.27 CM
BH CV ECHO MEAS - TR MAX PG: 18.6 MMHG
BH CV ECHO MEAS - TR MAX VEL: 215 CM/SEC
BH CV ECHO MEASUREMENTS AVERAGE E/E' RATIO: 5.36
LV EF BIPLANE MOD: 63 %

## 2025-03-25 PROCEDURE — 93356 MYOCRD STRAIN IMG SPCKL TRCK: CPT

## 2025-03-25 PROCEDURE — 77001 FLUOROGUIDE FOR VEIN DEVICE: CPT

## 2025-03-25 PROCEDURE — 71045 X-RAY EXAM CHEST 1 VIEW: CPT

## 2025-03-25 PROCEDURE — 25010000002 FENTANYL CITRATE (PF) 100 MCG/2ML SOLUTION

## 2025-03-25 PROCEDURE — 25010000002 MIDAZOLAM PER 1 MG

## 2025-03-25 PROCEDURE — 25010000002 ONDANSETRON PER 1 MG

## 2025-03-25 PROCEDURE — 25010000002 CEFAZOLIN PER 500 MG: Performed by: SURGERY

## 2025-03-25 PROCEDURE — 25010000002 PROPOFOL 10 MG/ML EMULSION

## 2025-03-25 PROCEDURE — 25010000002 LIDOCAINE PF 1% 1 % SOLUTION

## 2025-03-25 PROCEDURE — 93306 TTE W/DOPPLER COMPLETE: CPT

## 2025-03-25 PROCEDURE — 25010000002 BUPIVACAINE (PF) 0.25 % SOLUTION: Performed by: SURGERY

## 2025-03-25 PROCEDURE — C1788 PORT, INDWELLING, IMP: HCPCS | Performed by: SURGERY

## 2025-03-25 PROCEDURE — 93356 MYOCRD STRAIN IMG SPCKL TRCK: CPT | Performed by: INTERNAL MEDICINE

## 2025-03-25 PROCEDURE — 25810000003 LACTATED RINGERS PER 1000 ML: Performed by: ANESTHESIOLOGY

## 2025-03-25 PROCEDURE — 25010000002 DEXAMETHASONE PER 1 MG

## 2025-03-25 PROCEDURE — 76000 FLUOROSCOPY <1 HR PHYS/QHP: CPT

## 2025-03-25 PROCEDURE — 36561 INSERT TUNNELED CV CATH: CPT | Performed by: SURGERY

## 2025-03-25 PROCEDURE — 77001 FLUOROGUIDE FOR VEIN DEVICE: CPT | Performed by: SURGERY

## 2025-03-25 PROCEDURE — 93306 TTE W/DOPPLER COMPLETE: CPT | Performed by: INTERNAL MEDICINE

## 2025-03-25 DEVICE — POWERPORT CLEARVUE ISP IMPLANTABLE PORT WITH ATTACHABLE 8F POLYURETHANE OPEN-ENDED SINGLE-LUMEN VENOUS CATHETER PROCEDURAL KIT
Type: IMPLANTABLE DEVICE | Site: CHEST | Status: FUNCTIONAL
Brand: POWERPORT CLEARVUE

## 2025-03-25 RX ORDER — DEXAMETHASONE SODIUM PHOSPHATE 4 MG/ML
INJECTION, SOLUTION INTRA-ARTICULAR; INTRALESIONAL; INTRAMUSCULAR; INTRAVENOUS; SOFT TISSUE AS NEEDED
Status: DISCONTINUED | OUTPATIENT
Start: 2025-03-25 | End: 2025-03-25 | Stop reason: SURG

## 2025-03-25 RX ORDER — NALOXONE HCL 0.4 MG/ML
0.4 VIAL (ML) INJECTION AS NEEDED
Status: DISCONTINUED | OUTPATIENT
Start: 2025-03-25 | End: 2025-03-25 | Stop reason: HOSPADM

## 2025-03-25 RX ORDER — SODIUM CHLORIDE 9 MG/ML
100 INJECTION, SOLUTION INTRAVENOUS CONTINUOUS
Status: DISCONTINUED | OUTPATIENT
Start: 2025-03-25 | End: 2025-03-25 | Stop reason: HOSPADM

## 2025-03-25 RX ORDER — MEPERIDINE HYDROCHLORIDE 25 MG/ML
12.5 INJECTION INTRAMUSCULAR; INTRAVENOUS; SUBCUTANEOUS
Status: DISCONTINUED | OUTPATIENT
Start: 2025-03-25 | End: 2025-03-25 | Stop reason: HOSPADM

## 2025-03-25 RX ORDER — LIDOCAINE HYDROCHLORIDE 10 MG/ML
INJECTION, SOLUTION EPIDURAL; INFILTRATION; INTRACAUDAL; PERINEURAL AS NEEDED
Status: DISCONTINUED | OUTPATIENT
Start: 2025-03-25 | End: 2025-03-25 | Stop reason: SURG

## 2025-03-25 RX ORDER — OXYCODONE HYDROCHLORIDE 5 MG/1
5 TABLET ORAL ONCE AS NEEDED
Status: DISCONTINUED | OUTPATIENT
Start: 2025-03-25 | End: 2025-03-25 | Stop reason: HOSPADM

## 2025-03-25 RX ORDER — SODIUM CHLORIDE, SODIUM LACTATE, POTASSIUM CHLORIDE, CALCIUM CHLORIDE 600; 310; 30; 20 MG/100ML; MG/100ML; MG/100ML; MG/100ML
20 INJECTION, SOLUTION INTRAVENOUS CONTINUOUS
Status: DISPENSED | OUTPATIENT
Start: 2025-03-25 | End: 2025-03-25

## 2025-03-25 RX ORDER — MIDAZOLAM HYDROCHLORIDE 1 MG/ML
INJECTION, SOLUTION INTRAMUSCULAR; INTRAVENOUS AS NEEDED
Status: DISCONTINUED | OUTPATIENT
Start: 2025-03-25 | End: 2025-03-25 | Stop reason: SURG

## 2025-03-25 RX ORDER — IPRATROPIUM BROMIDE AND ALBUTEROL SULFATE 2.5; .5 MG/3ML; MG/3ML
3 SOLUTION RESPIRATORY (INHALATION) ONCE AS NEEDED
Status: DISCONTINUED | OUTPATIENT
Start: 2025-03-25 | End: 2025-03-25 | Stop reason: HOSPADM

## 2025-03-25 RX ORDER — HYDRALAZINE HYDROCHLORIDE 20 MG/ML
5 INJECTION INTRAMUSCULAR; INTRAVENOUS
Status: DISCONTINUED | OUTPATIENT
Start: 2025-03-25 | End: 2025-03-25 | Stop reason: HOSPADM

## 2025-03-25 RX ORDER — LABETALOL HYDROCHLORIDE 5 MG/ML
5 INJECTION, SOLUTION INTRAVENOUS
Status: DISCONTINUED | OUTPATIENT
Start: 2025-03-25 | End: 2025-03-25 | Stop reason: HOSPADM

## 2025-03-25 RX ORDER — EPHEDRINE SULFATE 5 MG/ML
5 INJECTION INTRAVENOUS ONCE AS NEEDED
Status: DISCONTINUED | OUTPATIENT
Start: 2025-03-25 | End: 2025-03-25 | Stop reason: HOSPADM

## 2025-03-25 RX ORDER — LIDOCAINE HYDROCHLORIDE 10 MG/ML
0.5 INJECTION, SOLUTION EPIDURAL; INFILTRATION; INTRACAUDAL; PERINEURAL ONCE AS NEEDED
Status: DISCONTINUED | OUTPATIENT
Start: 2025-03-25 | End: 2025-03-25 | Stop reason: HOSPADM

## 2025-03-25 RX ORDER — SODIUM CHLORIDE 0.9 % (FLUSH) 0.9 %
10 SYRINGE (ML) INJECTION AS NEEDED
Status: DISCONTINUED | OUTPATIENT
Start: 2025-03-25 | End: 2025-03-25 | Stop reason: HOSPADM

## 2025-03-25 RX ORDER — PROPOFOL 10 MG/ML
VIAL (ML) INTRAVENOUS AS NEEDED
Status: DISCONTINUED | OUTPATIENT
Start: 2025-03-25 | End: 2025-03-25 | Stop reason: SURG

## 2025-03-25 RX ORDER — SODIUM CHLORIDE 9 MG/ML
40 INJECTION, SOLUTION INTRAVENOUS AS NEEDED
Status: DISCONTINUED | OUTPATIENT
Start: 2025-03-25 | End: 2025-03-25 | Stop reason: HOSPADM

## 2025-03-25 RX ORDER — BUPIVACAINE HYDROCHLORIDE 2.5 MG/ML
INJECTION, SOLUTION EPIDURAL; INFILTRATION; INTRACAUDAL; PERINEURAL AS NEEDED
Status: DISCONTINUED | OUTPATIENT
Start: 2025-03-25 | End: 2025-03-25 | Stop reason: HOSPADM

## 2025-03-25 RX ORDER — DIPHENHYDRAMINE HYDROCHLORIDE 50 MG/ML
12.5 INJECTION, SOLUTION INTRAMUSCULAR; INTRAVENOUS ONCE AS NEEDED
Status: DISCONTINUED | OUTPATIENT
Start: 2025-03-25 | End: 2025-03-25 | Stop reason: HOSPADM

## 2025-03-25 RX ORDER — ONDANSETRON 2 MG/ML
4 INJECTION INTRAMUSCULAR; INTRAVENOUS ONCE AS NEEDED
Status: COMPLETED | OUTPATIENT
Start: 2025-03-25 | End: 2025-03-25

## 2025-03-25 RX ORDER — OXYCODONE HYDROCHLORIDE 5 MG/1
10 TABLET ORAL EVERY 4 HOURS PRN
Status: COMPLETED | OUTPATIENT
Start: 2025-03-25 | End: 2025-03-25

## 2025-03-25 RX ORDER — FLUMAZENIL 0.1 MG/ML
0.2 INJECTION INTRAVENOUS AS NEEDED
Status: DISCONTINUED | OUTPATIENT
Start: 2025-03-25 | End: 2025-03-25 | Stop reason: HOSPADM

## 2025-03-25 RX ORDER — SODIUM CHLORIDE 0.9 % (FLUSH) 0.9 %
3-10 SYRINGE (ML) INJECTION AS NEEDED
Status: DISCONTINUED | OUTPATIENT
Start: 2025-03-25 | End: 2025-03-25 | Stop reason: HOSPADM

## 2025-03-25 RX ORDER — FENTANYL CITRATE 50 UG/ML
INJECTION, SOLUTION INTRAMUSCULAR; INTRAVENOUS AS NEEDED
Status: DISCONTINUED | OUTPATIENT
Start: 2025-03-25 | End: 2025-03-25 | Stop reason: SURG

## 2025-03-25 RX ORDER — SODIUM CHLORIDE 0.9 % (FLUSH) 0.9 %
3 SYRINGE (ML) INJECTION EVERY 12 HOURS SCHEDULED
Status: DISCONTINUED | OUTPATIENT
Start: 2025-03-25 | End: 2025-03-25 | Stop reason: HOSPADM

## 2025-03-25 RX ORDER — FENTANYL CITRATE 50 UG/ML
50 INJECTION, SOLUTION INTRAMUSCULAR; INTRAVENOUS
Status: DISCONTINUED | OUTPATIENT
Start: 2025-03-25 | End: 2025-03-25 | Stop reason: HOSPADM

## 2025-03-25 RX ORDER — DIPHENHYDRAMINE HYDROCHLORIDE 50 MG/ML
12.5 INJECTION, SOLUTION INTRAMUSCULAR; INTRAVENOUS
Status: DISCONTINUED | OUTPATIENT
Start: 2025-03-25 | End: 2025-03-25 | Stop reason: HOSPADM

## 2025-03-25 RX ORDER — ONDANSETRON 2 MG/ML
INJECTION INTRAMUSCULAR; INTRAVENOUS AS NEEDED
Status: DISCONTINUED | OUTPATIENT
Start: 2025-03-25 | End: 2025-03-25 | Stop reason: SURG

## 2025-03-25 RX ORDER — PHENYLEPHRINE HCL IN 0.9% NACL 1 MG/10 ML
SYRINGE (ML) INTRAVENOUS AS NEEDED
Status: DISCONTINUED | OUTPATIENT
Start: 2025-03-25 | End: 2025-03-25 | Stop reason: SURG

## 2025-03-25 RX ADMIN — MIDAZOLAM 2 MG: 1 INJECTION INTRAMUSCULAR; INTRAVENOUS at 15:52

## 2025-03-25 RX ADMIN — ONDANSETRON 4 MG: 2 INJECTION INTRAMUSCULAR; INTRAVENOUS at 16:02

## 2025-03-25 RX ADMIN — ONDANSETRON 4 MG: 2 INJECTION, SOLUTION INTRAMUSCULAR; INTRAVENOUS at 19:07

## 2025-03-25 RX ADMIN — OXYCODONE HYDROCHLORIDE 10 MG: 5 TABLET ORAL at 17:23

## 2025-03-25 RX ADMIN — DEXAMETHASONE SODIUM PHOSPHATE 8 MG: 4 INJECTION, SOLUTION INTRAMUSCULAR; INTRAVENOUS at 16:02

## 2025-03-25 RX ADMIN — SODIUM CHLORIDE, SODIUM LACTATE, POTASSIUM CHLORIDE, CALCIUM CHLORIDE 20 ML/HR: 20; 30; 600; 310 INJECTION, SOLUTION INTRAVENOUS at 13:08

## 2025-03-25 RX ADMIN — PROPOFOL 250 MG: 10 INJECTION, EMULSION INTRAVENOUS at 15:58

## 2025-03-25 RX ADMIN — CEFAZOLIN 2000 MG: 2 INJECTION, POWDER, FOR SOLUTION INTRAMUSCULAR; INTRAVENOUS at 15:47

## 2025-03-25 RX ADMIN — LIDOCAINE HYDROCHLORIDE 50 MG: 10 INJECTION, SOLUTION EPIDURAL; INFILTRATION; INTRACAUDAL; PERINEURAL at 15:58

## 2025-03-25 RX ADMIN — FENTANYL CITRATE 100 MCG: 50 INJECTION, SOLUTION INTRAMUSCULAR; INTRAVENOUS at 15:52

## 2025-03-25 RX ADMIN — PROPOFOL 20 MG: 10 INJECTION, EMULSION INTRAVENOUS at 16:37

## 2025-03-25 RX ADMIN — Medication 3 ML: at 13:09

## 2025-03-25 RX ADMIN — Medication 50 MCG: at 16:26

## 2025-03-25 NOTE — INTERVAL H&P NOTE
H&P updated. The patient was examined and the following changes are noted:  Now status post cervical lymph node biopsy and confirmed lymphoma.  He presents today for port.  No questions.

## 2025-03-25 NOTE — ANESTHESIA PROCEDURE NOTES
Airway  Reason: elective    Date/Time: 3/25/2025 3:59 PM    General Information and Staff    Patient location during procedure: OR  CRNA/CAA: Ashley Bennett CRNA    Indications and Patient Condition  Indications for airway management: airway protection    Preoxygenated: yes    Mask difficulty assessment: 0 - not attempted    Final Airway Details    Final airway type: supraglottic airway      Successful airway: I-gel  Size: 5   Number of attempts at approach: 1  Assessment: lips, teeth, and gum same as pre-op and atraumatic intubation

## 2025-03-25 NOTE — ANESTHESIA PREPROCEDURE EVALUATION
Anesthesia Evaluation     Patient summary reviewed and Nursing notes reviewed   no history of anesthetic complications:   NPO Solid Status: > 8 hours  NPO Liquid Status: > 8 hours           Airway   Mallampati: II  Dental      Pulmonary    Cardiovascular         Neuro/Psych  GI/Hepatic/Renal/Endo      Musculoskeletal     Abdominal    Substance History      OB/GYN          Other      history of cancer active    ROS/Med Hx Other: Adenopathy suggestive lymphoma, planned Rt cervical lymph node excisional bx              Anesthesia Plan    ASA 3     general   total IV anesthesia  intravenous induction     Anesthetic plan, risks, benefits, and alternatives have been provided, discussed and informed consent has been obtained with: patient.    Plan discussed with CRNA.    CODE STATUS:

## 2025-03-25 NOTE — DISCHARGE INSTRUCTIONS
Follow up on an as-needed basis only  Keep dressings in place and dry for the first 72 hours and then may remove overlying dressings but leave Steri-Strips in place  After overlying dressings are removed may shower soap and water over Steri-Strips but no baths/soaking in water  If plan for treatment by oncology prior to 72 hours they may remove the overlying dressings to access the port  Over-the-counter Tylenol and ibuprofen as needed for pain control  May use ice to the area for pain control

## 2025-03-26 NOTE — PROGRESS NOTES
TREATMENT  PREPARATION    Johnny Miller  [id number]  2000    Chief Complaint: Treatment preparation and needs assessment    History of present illness:  Johnny Miller is a 24 y.o. year old male who is here today for treatment preparation and needs assessment.  The patient has been diagnosed with   Encounter Diagnosis   Name Primary?    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     Past Medical History, Past Surgical History, Social History, Family History have been reviewed and are without significant changes except as mentioned.    Physical Exam:    Vitals:    03/27/25 1258   BP: 124/80   Pulse: 64   Temp: 98.6 °F (37 °C)   SpO2: 98%     Vitals:    03/27/25 1258   PainSc: 3    PainLoc: Arm        ECOG score: 0             Physical Exam  Vitals reviewed.   Constitutional:       Appearance: Normal appearance.   HENT:      Head: Normocephalic and atraumatic.      Right Ear: External ear normal.      Left Ear: External ear normal.      Nose: Nose normal.      Mouth/Throat:      Mouth: Mucous membranes are moist.   Eyes:      Extraocular Movements: Extraocular movements intact.   Cardiovascular:      Rate and Rhythm: Normal rate and regular rhythm.      Pulses: Normal pulses.   Pulmonary:      Effort: Pulmonary effort is normal.   Abdominal:      Palpations: Abdomen is soft.   Musculoskeletal:         General: Normal range of motion.      Cervical back: Normal range of motion and neck supple.   Skin:     General: Skin is warm and dry.   Neurological:      General: No focal deficit present.       Mental Status: He is alert and oriented to person, place, and time.   Psychiatric:         Mood and Affect: Mood normal.         Behavior: Behavior normal.         Thought Content: Thought content normal.         Judgment: Judgment normal.           NEEDS ASSESSMENTS    Genetics  The patient's new diagnosis and family history have been reviewed for genetic counseling needs. The patient will not be referred..     Psychosocial and Barriers to care  The patient has completed a PHQ-9 Depression Screening and the Distress Thermometer (DT) today.  PHQ-9 results show PHQ-2 Total Score: 0     The patient scored their distress today as 0  on a scale of 0-10 with 0 being no distress and 10 being extreme distress. Problems marked by the patient as being an issue for them within the last week include   .      Results were reviewed along with psychosocial resources offered by our cancer center.  Our Supportive Oncology team will be flagged for a score of 4 or above, and a same day call will be made for a score of 9 or 10.  A mental health referral is offered at that time. Patients who score less than 4 have been educated on our support services and can be referred to our  upon request.  The patient will be referred to our .       Nutrition  The patient has completed the malnutrition screening today. They scored 0   with a score of 0-1 meaning not at risk in a score of 2 or greater meaning at risk.  Patients with a score of 3 or higher will be referred to our oncology dietitian for support. Patients beginning at risk treatment regimens or who have dietary concerns will also be referred to our oncology dietitian. The patient will be referred.    Functional Assessment  Persons who are age 70 or greater will be screened for qualification of a comprehensive geriatric assessment by our survivorship nurse practitioner.  Older adults with cancer face unique challenges. These may include an increased risk of  "drug reactions, financial burdens, and caregiver stress. The patient scored   . Patients scoring 14 or lower will referred for an older adult functional assessment with the survivorship advanced practice registered nurse to ensure all needed support is provided as patients plan for their treatments. NOT APPLICABLE    Intravenous Access Assessment  The patient and I discussed planned intravenous chemo/biotherapy as well as other IV treatments that are often needed throughout the course of treatment. These may include, but are not limited to blood transfusions, antibiotics, and IV hydration. Discussed that depending on selected treatment and vein assessment, patient may require venous access device (VAD) which could include but not limited to a Mediport or PICC line. Risks and benefits of VADs reviewed. The patient will be treated via Port.    Reproductive/Sexual Activity   People should avoid becoming pregnant and should not get a partner pregnant while undergoing chemo/biotherapy.  People of childbearing age should use effective contraception during active therapy. The best recommendation for all people is to use a barrier method for a minimum of 1 week after the last infusion of chemo/biotherapy to prevent your partner being exposed to byproducts from treatment medications in bodily fluids. Effective contraception should be discussed with your oncology team to make sure it is safe to take based on your diagnosis. Possible options include oral contraceptives, barrier methods. Chemo/biotherapy can change your ability to reproduce children in the future.  There are options for fertility preservation. The patient will be referred. Written information from [employer] given on sperm preservation and referral to [facility] made and discussed.    Advanced Care Planning  Advance Care Planning   The patient and I discussed advanced care planning, \"Conversations that Matter\".   This service is " offered for development of advance directives with a certified ACP facilitator.  The patient does not have an up-to-date advanced directive. This document is not on file with our office. The patient is not interested in an appointment with one of our facilitators to create or update their advanced directives.               Smoking cessation  Tobacco Use: Low Risk  (3/27/2025)    Patient History     Smoking Tobacco Use: Never     Smokeless Tobacco Use: Never     Passive Exposure: Not on file       Patient and I discussed their tobacco use history. Referral will not be made for smoking cessation.  Patient denies smoking.    Palliative Care  When appropriate, the patient and I discussed the availability palliative care services and when appropriate Hospice care. Palliative care is not the same as Hospice care which was explained to the patient.NOT APPLICABLE.    Survivorship   When appropriate, we discussed that we will refer the patient to survivorship clinic to discuss next steps following completion of planned treatment.  Reviewed this visit will include assessment of your physical, psychological, functional, and spiritual needs as a survivor and the need at attend this visit when scheduled.    TREATMENT EDUCATION    Today I met with the patient to discuss the chemo/biotherapy regimen recommended for treatment of Other classical Hodgkin lymphoma of lymph nodes of neck  .  The patient was given explanation of treatment premed side effects including office policy that prohibits patients to drive if sedating medications are administered, MD explanation given regarding benefits, side effects, toxicities and goals of treatment.  The patient received a Chemotherapy/Biotherapy Plan Summary including diagnosis and explanation of specific treatment plan.    SIDE EFFECTS:  Common side effects were discussed with the patient and/or significant other.  Discussion included where applicable hair loss/discoloration,  anemia/fatigue, infection/chills/fever, appetite, bleeding risk/precautions, constipation, diarrhea, mouth sores, taste alteration, loss of appetite, nausea/vomiting, peripheral neuropathy, skin/nail changes, rash, muscle aches/weakness, photosensitivity, weight gain/loss, hearing loss, dizziness, menopausal symptoms, menstrual irregularity, sterility, high blood pressure, heart damage, liver damage, lung damage, kidney damage, DVT/PE risk, fluid retention, pleural/pericardial effusion, somnolence, electrolyte/LFT imbalance, vein exercises and/or the possible need for vascular access/port placement.  The patient was advised that although uncommon, leakage of an infused medication from the vein or venous access device may lead to skin breakdown and/or other tissue damage.  The patient was advised that he/she may have pain, bleeding, and/or bruising from the insertion of a needle in their vein or venous access device (port).  The patient was further advised that, in spite of proper technique, infection with redness and irritation may rarely occur at the site where the needle was inserted.  The patient was advised that if complications occur, additional medical treatment is available.  Finally, where applicable we have reviewed rare but potential immune mediated side effects including shortness of breath, cough, chest pain (pneumonitis), abdominal pain, diarrhea (colitis), thyroiditis (hypothyroid or hyperthyroid), hepatitis and liver dysfunction, nephritis and renal dysfunction.    Discussion also included side effects specific to drugs in the treatment plan, specifically: Discussed potential cardiotoxicity of adriamycin and results of recent baseline echocardiogram ( EF 63%) and need for additional imaging during treatment for surveillance. Discussed potential lung toxicity of Bleomycin . Patient is scheduled for PFT next week. Discussed vinblastine and potential for neuropathy as well as nausea vomiting and flu-like  symptoms associated with dacarbazine.     Treatment Plans       No treatment plans exist            Questions answered and additional information discussed on topics including:  Anemia, Thrombocytopenia, Neutropenia, Nutrition and appetite changes, Constipation, Diarrhea, Nausea & vomiting, Mouth sores, Alopecia, Infertility, Intimate activity, Nervous system changes, Pain, Skin & nail changes, and Organ toxicities       Assessment and Plan:    Diagnoses and all orders for this visit:    1. Other classical Hodgkin lymphoma of lymph nodes of neck (Primary)    Other orders  -     lidocaine-prilocaine (EMLA) 2.5-2.5 % cream; Apply 1 Application topically to the appropriate area as directed As Needed for Mild Pain (one hour prior to port acess) for up to 30 days.  Dispense: 1 each; Refill: 2      No orders of the defined types were placed in this encounter.        The patient and I have reviewed their diagnosis and scheduled treatment plan. Needs assessment was completed where applicable including genetics, psychosocial needs, barriers to care, VAD evaluation, advanced care planning, survivorship, and palliative care services where indicated. Referrals have been ordered as appropriate based upon evaluation today and patient desires.   Chemo/biotherapy teaching was completed today and consent obtained. See separate documentation for further details.  Adequate time was given to answer questions.  Patient made aware of their care team members and contact information if they have questions or problems throughout the treatment course.  Discussion held and written information provided describing frequency of office visits and ongoing monitoring throughout the treatment plan.     Reviewed with patient any prescribed medication sent to pharmacy.  Education provided regarding proper storage, safe handling, and proper disposal of unused medication.  Proper handling of body fluids and waste discussed and written information  provided.  If appropriate, patient had pretreatment labs drawn today.    Learning assessment completed at initial patient encounter. See separate flowsheet. Chemo/biotherapy education comprehension assessed at today's visit.    I spent 60 minutes caring for Johnny on this date of service. This time includes time spent by me in the following activities: preparing for the visit, reviewing tests, obtaining and/or reviewing a separately obtained history, performing a medically appropriate examination and/or evaluation, counseling and educating the patient/family/caregiver, ordering medications, tests, or procedures, referring and communicating with other health care professionals, documenting information in the medical record, independently interpreting results and communicating that information with the patient/family/caregiver, and care coordination.     Talita White, APRN   03/27/25

## 2025-03-26 NOTE — ANESTHESIA POSTPROCEDURE EVALUATION
Patient: Johnny Miller    Procedure Summary       Date: 03/25/25 Room / Location: [facility] OR 08 / [facility] MAIN OR    Anesthesia Start: 1552 Anesthesia Stop: 1646    Procedure: Port-A-Cath/PowerPort placement (Left) Diagnosis:       Hodgkin lymphoma, unspecified Hodgkin lymphoma type, unspecified body region      (Hodgkin lymphoma, unspecified Hodgkin lymphoma type, unspecified body region [C81.90])    Surgeons: Cami Bradford MD Provider: Santo Valdes MD    Anesthesia Type: general ASA Status: 3            Anesthesia Type: general    Vitals  Vitals Value Taken Time   /60 03/25/25 17:31   Temp 97 °F (36.1 °C) 03/25/25 17:30   Pulse 74 03/25/25 17:30   Resp 15 03/25/25 17:30   SpO2 96 % 03/25/25 17:30           Post Anesthesia Care and Evaluation    Patient location during evaluation: PACU  Patient participation: complete - patient participated  Level of consciousness: awake  Pain scale: See nurse's notes for pain score.  Pain management: adequate    Airway patency: patent  Anesthetic complications: No anesthetic complications  PONV Status: none  Cardiovascular status: acceptable  Respiratory status: acceptable and spontaneous ventilation  Hydration status: acceptable    Comments: Patient seen and examined postoperatively; vital signs stable; SpO2 greater than or equal to 90%; cardiopulmonary status stable; nausea/vomiting adequately controlled; pain adequately controlled; no apparent anesthesia complications; patient discharged from anesthesia care when discharge criteria were met

## 2025-03-26 NOTE — OP NOTE
Operative Report    Patient Name:  Johnny Miller  YOB: 2000    Date of Surgery:  3/25/2025    Pre-op Diagnosis:   Hodgkin lymphoma       Post-op Diagnosis:   Hodgkin lymphoma    Procedure(s):  Left subclavian Port-A-Cath/PowerPort placement    Staff:  Surgeon(s):  Cami Bradford MD    Circulator: Mark Ernst RN  Radiology Technologist: Katharine Prado  Scrub Person: Deidra Rai    Anesthesia: General    Estimated Blood Loss: minimal    Implants:    Implant Name Type Inv. Item Serial No.  Lot No. LRB No. Used Action   KT PRT POWERPORT CLEARVUE MOD/BUMP INTERMD 8F 45CM - [id number] Implant KT PRT POWERPORT CLEARVUE MOD/BUMP INTERMD 8F 45CM  [employer] [id number] Left 1 Implanted     Specimen:          None      Findings: Wire and catheter in good position on intraoperative imaging.  Postoperative chest x-ray negative for pneumothorax and catheter in good position.    Complications: None immediate    CLINICAL INDICATIONS:  The patient is 24 year old male recently diagnosed with Hodgkin lymphoma and he presents today for port placement for treatment.  The surgery along with the associated risks (including bleeding, infection, pneumothorax), benefits, and alternatives.  The patient expressed understanding and wished to proceed.  Informed consent was obtained.    DESCRIPTION OF PROCEDURE:  The patient was brought to the operating room and placed in the supine position with both arms tucked and shoulder roll placed.  The patient was induced and his airway was secured by the Anesthesia service.  Preoperative antibiotics were administered.  The neck and chest regions were prepped and draped in the usual sterile fashion.  A time out was performed.    The skin and subcutaneous tissues were anesthetized with local anesthetic on the patient's left side.  The patient was placed in slight trendelenburg position.  I then carefully access the left subclavian vein with the  large bore needle.  A total of 2 attempts were made and the subclavian vein was successfully accessed.  The guide wire was then inserted and went in easily without resistance.  At this point an xray was obtained showing the guide wire in good position in the subclavian vein coursing into the superior vena cava.  The needle was withdrawn and the wire was left in place.  An incision was made for creation of the port pocket to include the needle stick location.  The incision was carried down to the pectoralis fascia with electrocautery and a pocket was created large enough to house the port.  The introducer sheath was then fed over the wire to dilate the catheter tract.  The wire was removed from the introducer sheath and the catheter was fed into the sheath to 35 cm.  The sheath was then torn away from the catheter holding the catheter in place.  Fluoroscopic images were then obtained showing the catheter going down into the superior vena cava.  Under fluoroscopic guidance the catheter was pulled back to the junction of the superior vena cava and the right atrium.  At this point we aspirated and flushed the catheter freely.  We then cut the catheter to the proper length and secured it to the port.  The port was then placed into the pocket that was created and secured to the pectoralis fascia in 2 locations using 3-0 prolene suture to prevent the port from flipping.  The port was accessed with the patino needle and again it aspirated and flushed freely.  It was flushed with heparinized saline.  Hemostasis was achieved with electrocautery.  The subcutaneous tissue closed with 3-0 vicryl suture in an interrupted fashion.  The skin incision was closed with 4-0 vicryl in a subcuticular fashion.  The incision was cleaned and sterile dressings were applied.    The patient tolerated the procedure well.  He was awakened from anesthesia and transferred to the recovery room in stable condition.  At the completion of the case, all  instrument, needle, and sponge counts were correct.  Postoperative chest xray was obtained showing the port in good position and no pneumothorax.      Cami Bradford MD     Date: 3/26/2025  Time: 13:37 EDT    This note was created using Dragon Voice Recognition software.

## 2025-03-27 ENCOUNTER — OFFICE VISIT (OUTPATIENT)
Dept: ONCOLOGY | Facility: CLINIC | Age: 25
End: 2025-03-27
Payer: COMMERCIAL

## 2025-03-27 ENCOUNTER — LAB (OUTPATIENT)
Dept: LAB | Facility: HOSPITAL | Age: 25
End: 2025-03-27
Payer: COMMERCIAL

## 2025-03-27 ENCOUNTER — CLINICAL SUPPORT (OUTPATIENT)
Dept: ONCOLOGY | Facility: CLINIC | Age: 25
End: 2025-03-27
Payer: COMMERCIAL

## 2025-03-27 VITALS
OXYGEN SATURATION: 98 % | BODY MASS INDEX: 36.03 KG/M2 | HEART RATE: 64 BPM | WEIGHT: 266 LBS | SYSTOLIC BLOOD PRESSURE: 124 MMHG | DIASTOLIC BLOOD PRESSURE: 80 MMHG | HEIGHT: 72 IN | TEMPERATURE: 98.6 F

## 2025-03-27 DIAGNOSIS — C81.71 OTHER CLASSICAL HODGKIN LYMPHOMA OF LYMPH NODES OF NECK: Primary | ICD-10-CM

## 2025-03-27 RX ORDER — LIDOCAINE AND PRILOCAINE 25; 25 MG/G; MG/G
1 CREAM TOPICAL AS NEEDED
Qty: 1 EACH | Refills: 2 | Status: SHIPPED | OUTPATIENT
Start: 2025-03-27 | End: 2025-04-26

## 2025-03-27 NOTE — PROGRESS NOTES
OSW met with pt as part of the treatment preparation process.  Pt was accompanied by his partner, Albin.  Introductions made and contact information provided.  Reviewed supports and services available at [facility].  Discussed advance directives.  Pt has none in place at this time. Pt encouraged to reach out should he decide to complete LW or HCR which were explained.  Pt and partner will be getting  in the next week.  They live in [place] and are the only two in the home.  Pt will be going on STD with his employer.  He will receive 60% of his income which has him somewhat concerned.  Albin will continue to work; however any time off will be without pay.  OSW assured pt that there are some sources of financial help and we can get those applications started.  We also discussed the [facility] Patient Assistance program.  Referral made to the financial counselor for f/up.  Health insurance will be changing and there are options available.  OSW suggested this be discussed with the financial counselor as well.  Pt is also interested in counseling.  He states he has a history of depression and feels he will need additional support.  He does take Wellbutrin XL 150mg.  Will refer to behavioral health.  OSW to meet with pt again to provide financial assistance applications and resource for house cleaning per pt request.  Encouraged pt to reach out with any additional concerns.

## 2025-03-28 ENCOUNTER — TELEPHONE (OUTPATIENT)
Dept: ONCOLOGY | Facility: CLINIC | Age: 25
End: 2025-03-28

## 2025-03-28 NOTE — TELEPHONE ENCOUNTER
Caller: Johnny Miller    Relationship: Self    Best call back number:[phone]       Who are you requesting to speak with (clinical staff, provider,  specific staff member): CLINICAL        What was the call regarding: REGARDING FORMS TO BE RECEIVED FROM [employer] REGARDING SHORT TERM DISABILITY / FMLA.    PATIENT NEEDS DATE EFFECTIVE OF 3/24/25 TO REFRAIN FROM WORK

## 2025-03-28 NOTE — TELEPHONE ENCOUNTER
Caller: Johnny Miller    Relationship to patient: Self    Best call back number: [phone]     Chief complaint: PATIENT REQUESTING STATUS UPDATE REGARDING BONE MARROW BX SCHEDULING

## 2025-03-31 ENCOUNTER — CLINICAL SUPPORT (OUTPATIENT)
Dept: ONCOLOGY | Facility: CLINIC | Age: 25
End: 2025-03-31
Payer: COMMERCIAL

## 2025-03-31 ENCOUNTER — PATIENT OUTREACH (OUTPATIENT)
Dept: ONCOLOGY | Facility: CLINIC | Age: 25
End: 2025-03-31
Payer: COMMERCIAL

## 2025-03-31 ENCOUNTER — OFFICE VISIT (OUTPATIENT)
Dept: PULMONOLOGY | Facility: HOSPITAL | Age: 25
End: 2025-03-31
Payer: COMMERCIAL

## 2025-03-31 ENCOUNTER — HOSPITAL ENCOUNTER (OUTPATIENT)
Dept: ONCOLOGY | Facility: HOSPITAL | Age: 25
Discharge: HOME | End: 2025-03-31
Payer: COMMERCIAL

## 2025-03-31 ENCOUNTER — NUTRITION (OUTPATIENT)
Dept: ONCOLOGY | Facility: CLINIC | Age: 25
End: 2025-03-31
Payer: COMMERCIAL

## 2025-03-31 VITALS — HEIGHT: 72 IN | BODY MASS INDEX: 35.62 KG/M2 | WEIGHT: 263 LBS

## 2025-03-31 VITALS
HEIGHT: 72 IN | OXYGEN SATURATION: 98 % | WEIGHT: 263 LBS | BODY MASS INDEX: 35.62 KG/M2 | DIASTOLIC BLOOD PRESSURE: 78 MMHG | SYSTOLIC BLOOD PRESSURE: 119 MMHG | HEART RATE: 75 BPM | RESPIRATION RATE: 17 BRPM

## 2025-03-31 DIAGNOSIS — R05.9 COUGH, UNSPECIFIED TYPE: Primary | ICD-10-CM

## 2025-03-31 DIAGNOSIS — C81.90 HODGKIN LYMPHOMA, UNSPECIFIED HODGKIN LYMPHOMA TYPE, UNSPECIFIED BODY REGION: Primary | ICD-10-CM

## 2025-03-31 DIAGNOSIS — C81.71 OTHER CLASSICAL HODGKIN LYMPHOMA OF LYMPH NODES OF NECK: ICD-10-CM

## 2025-03-31 PROCEDURE — G0463 HOSPITAL OUTPT CLINIC VISIT: HCPCS

## 2025-03-31 RX ORDER — SODIUM CHLORIDE 0.9 % (FLUSH) 0.9 %
10 SYRINGE (ML) INJECTION AS NEEDED
Status: DISCONTINUED | OUTPATIENT
Start: 2025-03-31 | End: 2025-04-01 | Stop reason: HOSPADM

## 2025-03-31 RX ORDER — HEPARIN SODIUM (PORCINE) LOCK FLUSH IV SOLN 100 UNIT/ML 100 UNIT/ML
500 SOLUTION INTRAVENOUS AS NEEDED
Status: CANCELLED | OUTPATIENT
Start: 2025-03-31

## 2025-03-31 RX ORDER — HEPARIN SODIUM (PORCINE) LOCK FLUSH IV SOLN 100 UNIT/ML 100 UNIT/ML
500 SOLUTION INTRAVENOUS AS NEEDED
Status: DISCONTINUED | OUTPATIENT
Start: 2025-03-31 | End: 2025-04-01 | Stop reason: HOSPADM

## 2025-03-31 RX ORDER — SODIUM CHLORIDE 0.9 % (FLUSH) 0.9 %
10 SYRINGE (ML) INJECTION AS NEEDED
Status: CANCELLED | OUTPATIENT
Start: 2025-03-31

## 2025-03-31 NOTE — SIGNIFICANT NOTE
Message received about patient from respiratory. He was trying to schedule his appts. Adding to navigation. Called patient and introduced myself. Also moved his PFTs too sooner and made him aware of PET instructions and locations. He verbalized understanding and has my direct number for any concerns.     Bone marrow biopsy ordered and message sent to IR, she will call him this afternoon to schedule.       Annia Nogueira  Lung Nurse Navigator   [facility]  [phone]  [email or web address]

## 2025-03-31 NOTE — PROGRESS NOTES
OSW met with pt and completed application for [employer] for financial assistance with home bills.  Pt will bring/send bills as they are due and OSW will send to [employer].

## 2025-03-31 NOTE — PROGRESS NOTES
Nutrition Assessment  Johnny Miller    Height: 6'  Weight: 263#  BMI: 35.7  Weight Hx:   03/31/25 119 kg (263 lb)   03/31/25 119 kg (263 lb)   03/27/25 121 kg (266 lb)   03/25/25 119 kg (262 lb)   03/20/25 119 kg (262 lb)   03/19/25 119 kg (262 lb)   03/10/25 116 kg (256 lb 6.4 oz)   03/04/25 119 kg (262 lb 4.8 oz)   02/17/25 118 kg (261 lb)   03/15/22 88.5 kg (195 lb)   01/07/22 117 kg (258 lb)   02/05/21 109 kg (239 lb 3.2 oz)   06/21/19 109 kg (240 lb)   09/25/18 99.1 kg (218 lb 8 oz)   08/03/18 95.7 kg (211 lb)   07/31/18 96.2 kg (212 lb)     IBW: 178# (147.7%)  UBW: 250# - 260# (101.1% - 105.2%)    Nutrition Questionnaire    Food Allergies: Pt denied allergies at this time    Chewing Problems: Pt denied chewing problems at this time.      Swallowing Problems: Pt denied problems swallowing at this time.    Who does the cooking & shopping: Pt and his fianceAlbin. Pt and fiance plan to get , tomorrow, April 1st.    Nausea/Vomiting/Diarrhea: Pt denies n/v/d/c    Appetite: (poor) 1 2 3 4 5 6 7 8 9 10 (excellent): 5.5    24-Hour Diet Recall:  Breakfast - perfect bar, [place] oatmeal bar, water, coke (noon)  Lunch - nothing  Dinner - mozzarella sticks x6, chicken nuggets x15, sugar cookie, triscuits, crescent roll, strawberry idalia dry, baja blast   Snacks - ice cream  Water < 64 oz/day    Discussion: Met the pt and his fianceAlbin, to provide new-pt diet education. We reviewed possible side effects of his treatment and how it may impact his ability to eat and tolerate food. We discussed the importance of weight maintenance, consuming adequate calories and protein, even when appetite is low, taste changes occur, and energy is low, pt verbalized understanding. We reviewed ways to manage side effects with diet, pt verbalized understanding.    All questions and concerns were addressed and answered. I provided my contact information and encouraged them to call or schedule an appointment as  needed.    Nutrition-Related Side Effects:    []Abdominal Pain  [x]Constipation  [x]Diarrhea  []Electrolyte Imbalance  []Fatigue  []HTN  []Hypertriglyceridemia  []Hyponatremia  []Loss of Appetite  []Low Blood Pressure  []Metallic Taste  [x]Mouth Sores  [x]Nausea  [x]Neutropenia  []Peripheral Neuropathy  []Proteinuria  []Shortness of Breath  []Taste Changes  [x]Vomiting  [x]Weakness  [x]Weight Loss  [x]Stomach Upset    Sean Arrieta, MS,RD,LD-[place],CD-[place]  Registered Dietitian

## 2025-03-31 NOTE — PROGRESS NOTES
PULMONARY/ CRITICAL CARE/ SLEEP MEDICINE OUTPATIENT CONSULT/ FOLLOW UP NOTE        Patient Name:  Johnny Miller    :  2000    Medical Record:  [id number]    PRIMARY CARE PHYSICIAN     Graciela Cotter APRN    REASON FOR CONSULTATION    Johnny Miller is a 24 y.o. male who is referred for consultation for PFT  REVIEW OF SYSTEMS    Constitutional:  Denies fever or chills   Eyes:  Denies change in visual acuity   HENT:  Denies nasal congestion or sore throat   Respiratory:  Denies cough or shortness of breath   Cardiovascular:  Denies chest pain or edema   GI:  Denies abdominal pain, nausea, vomiting, bloody stools or diarrhea   :  Denies dysuria   Musculoskeletal:  Denies back pain or joint pain   Integument:  Denies rash   Neurologic:  Denies headache, focal weakness or sensory changes   Endocrine:  Denies polyuria or polydipsia   Lymphatic:  Denies swollen glands   Psychiatric:  Denies depression or anxiety     MEDICAL HISTORY    Past Medical History:   Diagnosis Date    Acne     ADHD     Supraclavicular mass         SURGICAL HISTORY    Past Surgical History:   Procedure Laterality Date    CERVICAL LYMPH NODE BIOPSY/EXCISION Right 3/10/2025    Procedure: Right cervical mass/lymph node excisional biopsy;  Surgeon: Cami Bradford MD;  Location: [facility];  Service: General;  Laterality: Right;    NO PAST SURGERIES      VENOUS ACCESS DEVICE (PORT) INSERTION Left 3/25/2025    Procedure: Port-A-Cath/PowerPort placement;  Surgeon: Cami Bradford MD;  Location: [facility];  Service: General;  Laterality: Left;        FAMILY HISTORY    Family History   Problem Relation Age of Onset    Alcohol abuse Father     Diabetes Father        SOCIAL HISTORY    Social History     Tobacco Use    Smoking status: Never     Passive exposure: Never    Smokeless tobacco: Never   Substance Use Topics    Alcohol use: Yes     Alcohol/week: 2.0 standard drinks of alcohol     Types: 2 Cans of beer per week        ALLERGIES    No  "Known Allergies      MEDICATIONS    Current Outpatient Medications on File Prior to Visit   Medication Sig Dispense Refill    ASHWAGANDHA PO Take 600 mg by mouth.      buPROPion XL (WELLBUTRIN XL) 150 MG 24 hr tablet Take 1 tablet by mouth Every Morning.      cholecalciferol (Vitamin D) 25 MCG (1000 UT) tablet       lidocaine-prilocaine (EMLA) 2.5-2.5 % cream Apply 1 Application topically to the appropriate area as directed As Needed for Mild Pain (one hour prior to port acess) for up to 30 days. 1 each 2    melatonin 5 MG tablet tablet Take  by mouth.      pyridoxine ([employer] B6) 100 MG tablet Take 1 tablet by mouth Daily.      Zinc 50 MG tablet Take  by mouth.       No current facility-administered medications on file prior to visit.       PHYSICAL EXAM    Vitals:    03/31/25 1100   Weight: 119 kg (263 lb)   Height: 182.9 cm (72\")        Constitutional:  Well developed, well nourished, no acute distress, non-toxic appearance   Eyes:  PERRL, conjunctiva normal   HENT:  Atraumatic, external ears normal, nose normal, oropharynx moist, no pharyngeal exudates. mallampatti   Neck- normal range of motion, no tenderness, supple   Respiratory:  No respiratory distress, normal breath sounds, no rales, no wheezing   Cardiovascular:  Normal rate, normal rhythm, no murmurs, no gallops, no rubs   GI:  Soft, nondistended, normal bowel sounds, nontender, no organomegaly, no mass, no rebound, no guarding   :  No costovertebral angle tenderness   Musculoskeletal:  No edema, no tenderness, no deformities. Back- no tenderness  Integument:  Well hydrated, no rash   Lymphatic:  No lymphadenopathy noted   Neurologic:  Alert & oriented x 3, CN 2-12 normal, normal motor function, normal sensory function, no focal deficits noted   Psychiatric:  Speech and behavior appropriate     XR Chest 1 View  Result Date: 3/25/2025  Impression: Left subclavian port infusion catheter in good position without evidence of complication. Suspected mediastinal " and lower right neck adenopathy. Electronically Signed: Steffen Reynaga MD  3/25/2025 7:17 PM EDT  Workstation ID: [id number]    CT Soft Tissue Neck With Contrast  Result Date: 2/25/2025  Impression: The lesion of interest in the right supraclavicular region is visualized as an avidly enhancing ovoid soft tissue mass measuring around 5 x 3 cm. There is also relatively extensive adjacent supraclavicular adenopathy as well as enlarged pathologic lymphadenopathy within the mediastinum and partially visualized retroperitoneum in the upper abdomen. Findings overall would favor lymphoma. Soft tissue sampling is advised and alternatively the supraclavicular mass could reflect a primary neoplasm with remaining multistation adenopathy metastatic, with is considered less likely. Electronically Signed: Gary Goldman MD  2/25/2025 3:41 PM EST  Workstation ID: [id number]    CT Chest With Contrast Diagnostic  Result Date: 2/25/2025  Impression: The lesion of interest in the right supraclavicular region is visualized as an avidly enhancing ovoid soft tissue mass measuring around 5 x 3 cm. There is also relatively extensive adjacent supraclavicular adenopathy as well as enlarged pathologic lymphadenopathy within the mediastinum and partially visualized retroperitoneum in the upper abdomen. Findings overall would favor lymphoma. Soft tissue sampling is advised and alternatively the supraclavicular mass could reflect a primary neoplasm with remaining multistation adenopathy metastatic, with is considered less likely. Electronically Signed: Gary Goldman MD  2/25/2025 3:41 PM EST  Workstation ID: [id number]     Results for orders placed during the hospital encounter of 03/25/25    Adult Transthoracic Echo Complete W/ Cont if Necessary Per Protocol    Interpretation Summary    Left ventricular systolic function is normal. Calculated left ventricular EF = 63%    Left ventricular diastolic function was normal.    Estimated right ventricular  systolic pressure from tricuspid regurgitation is normal (<35 mmHg).    Transthoracic echocardiography reveals EF of 63%.  No effusion      Electronically signed by Alex Bourgeois MD, 03/25/25, 6:12 PM EDT.      ASSESSMENT & PLAN:      24-year-old male non-smoker no significant past medical history recently diagnosed with Hodgkin lymphoma, he was referred for pretreatment pulmonary function test evaluation  Lungs are clear to exam  No significant symptoms of obstructive sleep apnea at present time  Chest x-ray 3/25/2025 no acute findings    Plan:  PFT    This document has been electronically signed by  Cisco Houston MD  11:05 EDT

## 2025-04-02 NOTE — PROGRESS NOTES
Hematology/Oncology Outpatient Follow Up    PATIENT NAME:Johnny Miller  :2000  MRN: [id number]  PRIMARY CARE PHYSICIAN: Graciela Cotter APRN  REFERRING PHYSICIAN: No ref. provider found    Chief Complaint   Patient presents with    Follow-up     Other classical Hodgkin lymphoma of lymph nodes of neck        HISTORY OF PRESENT ILLNESS:     24-year-old male who has been referred secondary to right supraclavicular mass.  Patient felt a lump on the right neck over the past 4 months.  At the time he noticed it was around the time he had COVID-19 infection in 2024.  He denies night sweats weight loss or fatigue symptoms.  Patient does not smoke.  He drinks only 1 beer per week.  As far as he knows there is no family history of cancer.  States that the mass on the right neck is not painful.     For that reason patient had an ultrasound of the neck completed in 2025 which basically revealed a 3.9 cm well-circumscribed mass right supraclavicular area suspicious characteristics.    For that reason he has been referred to us for further evaluation and management        Patient is alone today for this appointment.    2024: patient had CT scan of the neck and chest with basically revealed right supraclavicular mass measuring approximately 5 x 3 cm enlarged pathologic lymphadenopathy within the mediastinum partially visualized upper retroperitoneal area.  The above findings suggest lymphoproliferative disease  3/19/2025: Patient had additional labs including HIV which was negative LDH was normal, uric acid was normal, sed rate was 27 his white count is 7, hemoglobin 14.4 platelets are 268  4/3/2025: Patient had a PET CT scan which basically revealed hypermetabolic lymphadenopathy in the neck, chest abdomen  Past Medical History:   Diagnosis Date    Acne     ADHD     Supraclavicular mass        Past Surgical History:   Procedure Laterality Date    CERVICAL LYMPH NODE BIOPSY/EXCISION Right 3/10/2025     Procedure: Right cervical mass/lymph node excisional biopsy;  Surgeon: Cami Bradford MD;  Location: [facility] MAIN OR;  Service: General;  Laterality: Right;    NO PAST SURGERIES      VENOUS ACCESS DEVICE (PORT) INSERTION Left 3/25/2025    Procedure: Port-A-Cath/PowerPort placement;  Surgeon: Cami Bradford MD;  Location: [facility] MAIN OR;  Service: General;  Laterality: Left;         Current Outpatient Medications:     ASHWAGANDHA PO, Take 600 mg by mouth., Disp: , Rfl:     buPROPion XL (WELLBUTRIN XL) 150 MG 24 hr tablet, Take 1 tablet by mouth Every Morning., Disp: , Rfl:     cholecalciferol (Vitamin D) 25 MCG (1000 UT) tablet, , Disp: , Rfl:     lidocaine-prilocaine (EMLA) 2.5-2.5 % cream, Apply 1 Application topically to the appropriate area as directed As Needed for Mild Pain (one hour prior to port acess) for up to 30 days., Disp: 1 each, Rfl: 2    melatonin 5 MG tablet tablet, Take  by mouth., Disp: , Rfl:     pyridoxine (CVS B6) 100 MG tablet, Take 1 tablet by mouth Daily., Disp: , Rfl:     Zinc 50 MG tablet, Take  by mouth., Disp: , Rfl:   No current facility-administered medications for this visit.    Facility-Administered Medications Ordered in Other Visits:     heparin injection 500 Units, 500 Units, Intravenous, PRN, Alethea Mehta MD, 500 Units at 04/04/25 1137    sodium chloride 0.9 % flush 10 mL, 10 mL, Intravenous, PRN, Alethea Mehta MD, 10 mL at 04/04/25 1137    No Known Allergies    Family History   Problem Relation Age of Onset    Alcohol abuse Father     Diabetes Father        Cancer-related family history is not on file.    Social History     Tobacco Use    Smoking status: Never     Passive exposure: Never    Smokeless tobacco: Never   Vaping Use    Vaping status: Never Used   Substance Use Topics    Alcohol use: Yes     Alcohol/week: 2.0 standard drinks of alcohol     Types: 2 Cans of beer per week    Drug use: Never       I have reviewed and confirmed the accuracy of the  "patient's history: Chief complaint, HPI, ROS, and Subjective as entered by the MA/LPN/RN. Alethea Mehta MD 04/04/25      SUBJECTIVE:     He denies B symptoms.  Today to finalize his treatment plans.  He has a left Mediport.    REVIEW OF SYSTEMS:  Review of Systems   Constitutional:  Negative for chills, fatigue and fever.   HENT:  Negative for congestion, drooling, ear discharge, rhinorrhea, sinus pressure and tinnitus.    Eyes:  Negative for photophobia, pain and discharge.   Respiratory:  Negative for apnea, choking and stridor.    Cardiovascular:  Negative for palpitations.   Gastrointestinal:  Negative for abdominal distention, abdominal pain and anal bleeding.   Endocrine: Negative for polydipsia and polyphagia.   Genitourinary:  Negative for decreased urine volume, flank pain and genital sores.   Musculoskeletal:  Negative for gait problem, neck pain and neck stiffness.   Skin:  Negative for color change, rash and wound.   Neurological:  Negative for tremors, seizures, syncope, facial asymmetry and speech difficulty.   Hematological:  Negative for adenopathy.   Psychiatric/Behavioral:  Negative for agitation, confusion, hallucinations and self-injury. The patient is not hyperactive.        OBJECTIVE:    Vitals:    04/04/25 1143   BP: 138/92   Pulse: 84   Temp: 98.6 °F (37 °C)   SpO2: 98%   Weight: 122 kg (269 lb)   Height: 177.8 cm (70\")   PainSc: 0-No pain       Body mass index is 38.6 kg/m².    ECOG  (0) Fully active, able to carry on all predisease performance without restriction    Physical Exam    Right Supraclavicular zayra enlargement    Left Mediport in place    RECENT LABS  WBC   Date Value Ref Range Status   04/04/2025 8.24 3.40 - 10.80 10*3/mm3 Final     RBC   Date Value Ref Range Status   04/04/2025 4.97 4.14 - 5.80 10*6/mm3 Final     Hemoglobin   Date Value Ref Range Status   04/04/2025 14.5 13.0 - 17.7 g/dL Final     Hematocrit   Date Value Ref Range Status   04/04/2025 44.4 37.5 - 51.0 " % Final     MCV   Date Value Ref Range Status   04/04/2025 89.3 79.0 - 97.0 fL Final     MCH   Date Value Ref Range Status   04/04/2025 29.2 26.6 - 33.0 pg Final     MCHC   Date Value Ref Range Status   04/04/2025 32.7 31.5 - 35.7 g/dL Final     RDW   Date Value Ref Range Status   04/04/2025 13.6 12.3 - 15.4 % Final     RDW-SD   Date Value Ref Range Status   04/04/2025 43.6 37.0 - 54.0 fl Final     MPV   Date Value Ref Range Status   04/04/2025 10.2 6.0 - 12.0 fL Final     Platelets   Date Value Ref Range Status   04/04/2025 217 140 - 450 10*3/mm3 Final     Neutrophil %   Date Value Ref Range Status   04/04/2025 64.0 42.7 - 76.0 % Final     Lymphocyte %   Date Value Ref Range Status   04/04/2025 27.4 19.6 - 45.3 % Final     Monocyte %   Date Value Ref Range Status   04/04/2025 6.8 5.0 - 12.0 % Final     Eosinophil %   Date Value Ref Range Status   04/04/2025 1.6 0.3 - 6.2 % Final     Basophil %   Date Value Ref Range Status   04/04/2025 0.2 0.0 - 1.5 % Final     Neutrophils, Absolute   Date Value Ref Range Status   04/04/2025 5.27 1.70 - 7.00 10*3/mm3 Final     Lymphocytes, Absolute   Date Value Ref Range Status   04/04/2025 2.26 0.70 - 3.10 10*3/mm3 Final     Monocytes, Absolute   Date Value Ref Range Status   04/04/2025 0.56 0.10 - 0.90 10*3/mm3 Final     Eosinophils, Absolute   Date Value Ref Range Status   04/04/2025 0.13 0.00 - 0.40 10*3/mm3 Final     Basophils, Absolute   Date Value Ref Range Status   04/04/2025 0.02 0.00 - 0.20 10*3/mm3 Final     nRBC   Date Value Ref Range Status   02/05/2021 0.0 0.0 - 0.2 /100 WBC Final       Lab Results   Component Value Date    GLUCOSE 141 (H) 04/04/2025    BUN 12 04/04/2025    CREATININE 1.01 04/04/2025    EGFRIFNONA 82 02/05/2021    BCR 11.9 04/04/2025    K 3.9 04/04/2025    CO2 21.4 (L) 04/04/2025    CALCIUM 9.1 04/04/2025    ALBUMIN 4.2 04/04/2025    AST 31 04/04/2025    ALT 60 (H) 04/04/2025         Assessment & Plan     Hodgkin lymphoma, unspecified Hodgkin lymphoma  type, unspecified body region  - CBC & Differential        ASSESSMENT:    Supraclavicular mass  - CBC & Differential        Classical Hodgkin's lymphoma nodular sclerosis status post biopsy of right supraclavicular mass.  No B symptoms.  Clinical stage III disease as patient has lymphadenopathy above and below the diaphragm  Fertility preservation discussion.  Patient has opted to proceed with this and has all the information he needs  I recommended combination chemotherapy with modification of his regimen to include nivolumab plus AVD based on recent clinical trial showing improved progression free survivorship at approximately 92% versus 83% compared to BV AVD.  Patient is a Non-smoker       Discussion    Reviewed diagnosis of Hodgkin's lymphoma  Discussed that this is a curable disease  Discussed the need for complete staging  Discussed that there is significant role of chemotherapy  Reviewed  Nivolumab plus AVD regimen in detail    I reviewed the benefits the side effects  Chemotherapy side effects include, but not limited to, nausea, vomiting, bone marrow suppression, which can result in blood, platelet transfusion. There is also risk of permanent bone marrow destruction, which can cause myelodysplastic syndrome or leukemia years down the line. There is risk of infection which can result in hospitalization and even death. There is also risk of fatigue, asthenia, alopecia which could become permanent. Chemo will help to reduce risk of relapse of cancer, but does not eliminate risk completely.     Discussed that we will avoid bleomycin with this regimen.  He Does not have any contraindication to nivolumab  Adriamycin can cause cardiotoxicity     Side effects of nivolumab to include but not limited to    Diarrhea, fatigue, pruritus and rash.  Also included pulmonary toxicity, hepatotoxicity, nephrotoxicity as well as neurotoxicity. There is potential for endocrine and metabolic abnormalities including  hyperglycemia, hypertriglyceridemia, hyponatremia, phosphorus abnormalities, hypothyroidism or hyperthyroidism.  There could also be pancytopenia, risk of infection and peripheral neuropathy.  Discussed lab monitoring that will be needed while on continuous immunotherapy and medicines to help with supportive care.          Plans        Chemotherapy with nivolumab AVD    Proceed with bone marrow biopsy scheduled   4/8/25  Give patient information on nivolumab. He has had education on chemotherapy    Begin allopurinol 300 mg p.o. nightly  Give him handicap placard  Referred to  for counseling  Schedule chemo to begin 4/14/2025  Follow-up with me 4/21/2025      ABVD regimen was discussed           History & physical (H&P) including: B symptoms (unexplained fever   >38°C; drenching night sweats; or weight loss >10% of body weight   within 6 mo of diagnosis), alcohol intolerance, pruritus, fatigue,   performance status, and examination of lymphoid regions, spleen,   and liver   Complete blood count (CBC), differential   Erythrocyte sedimentation rate (ESR)   Comprehensive metabolic panel, lactate dehydrogenase (LDH), and   liver function test (LFT)   Human immunodeficiency virus (HIV) testing (See NCCN Guidelines   for Cancer in People with HIV)   Pregnancy test for those of childbearing potential prior to cytotoxic   chemotherapy or radiation therapy (RT)   FDG-PET/CT scan (skull base to mid-thigh or vertex to feet in   selected cases)c,d   Counseling: Fertility/psychosociale and smoking cessation (See   NCCN Guidelines for Smoking Cessation)   Useful in selected cases:   Fertility preservatione,f   Pulmonary function tests ([PFTs] including diffusing capacity of the   lung for carbon monoxide [DLCO])g if ABVDh,i is being used   Hepatitis B/C testing (encouraged)   Diagnostic CTj (contrast-enhanced)    Chest x-ray (encouraged, especially if large mediastinal mass)   Adequate bone marrow biopsy if there  are unexplained cytopenias   other than anemia and negative FDG-PETk   Echocardiogram or multigated acquisition (MUGA) scan and   consideration of atorvastatin1 if anthracycline-based chemotherapy   is indicated   MRI of select sites, with contrast unless contraindicated       I spent 40 total minutes, face-to-face, caring for Johnny today. 90% of this time involved counseling and/or coordination of care as documented within this note.       Electronically signed by Alethea Mehta MD, 04/04/25, 7:25 PM EDT.

## 2025-04-03 ENCOUNTER — HOSPITAL ENCOUNTER (OUTPATIENT)
Dept: PET IMAGING | Facility: HOSPITAL | Age: 25
Discharge: HOME | End: 2025-04-03
Admitting: INTERNAL MEDICINE
Payer: COMMERCIAL

## 2025-04-03 ENCOUNTER — TELEPHONE (OUTPATIENT)
Dept: ONCOLOGY | Facility: CLINIC | Age: 25
End: 2025-04-03

## 2025-04-03 DIAGNOSIS — C81.71 OTHER CLASSICAL HODGKIN LYMPHOMA OF LYMPH NODES OF NECK: ICD-10-CM

## 2025-04-03 LAB — GLUCOSE BLDC GLUCOMTR-MCNC: 103 MG/DL (ref 70–105)

## 2025-04-03 PROCEDURE — 82948 REAGENT STRIP/BLOOD GLUCOSE: CPT

## 2025-04-03 PROCEDURE — 78815 PET IMAGE W/CT SKULL-THIGH: CPT

## 2025-04-03 PROCEDURE — A9552 F18 FDG: HCPCS | Performed by: INTERNAL MEDICINE

## 2025-04-03 PROCEDURE — 34310000005 FLUDEOXYGLUCOSE F18 SOLUTION: Performed by: INTERNAL MEDICINE

## 2025-04-03 RX ADMIN — FLUDEOXYGLUCOSE F 18 1 DOSE: 200 INJECTION, SOLUTION INTRAVENOUS at 13:19

## 2025-04-03 NOTE — TELEPHONE ENCOUNTER
Caller: Johnny Miller    Relationship: Self    Best call back number: [phone]    What form or medical record are you requesting: FMLA    Who is requesting this form or medical record from you: PT    How would you like to receive the form or medical records (pick-up, mail, fax): FAX    If fax, what is the fax number: [phone]    Additional notes: PT CALLING WITH UPDATED FAX NUMBER FOR [employer] REGARDING FMLA PAPERWORK

## 2025-04-04 ENCOUNTER — HOSPITAL ENCOUNTER (OUTPATIENT)
Dept: ONCOLOGY | Facility: HOSPITAL | Age: 25
Discharge: HOME | End: 2025-04-04
Payer: COMMERCIAL

## 2025-04-04 ENCOUNTER — HOSPITAL ENCOUNTER (OUTPATIENT)
Dept: RESPIRATORY THERAPY | Facility: HOSPITAL | Age: 25
Discharge: HOME | End: 2025-04-04
Payer: COMMERCIAL

## 2025-04-04 ENCOUNTER — OFFICE VISIT (OUTPATIENT)
Dept: ONCOLOGY | Facility: CLINIC | Age: 25
End: 2025-04-04
Payer: COMMERCIAL

## 2025-04-04 VITALS
HEIGHT: 70 IN | BODY MASS INDEX: 38.51 KG/M2 | SYSTOLIC BLOOD PRESSURE: 138 MMHG | HEART RATE: 84 BPM | DIASTOLIC BLOOD PRESSURE: 92 MMHG | OXYGEN SATURATION: 98 % | TEMPERATURE: 98.6 F | WEIGHT: 269 LBS

## 2025-04-04 VITALS — HEIGHT: 70 IN | BODY MASS INDEX: 37.74 KG/M2

## 2025-04-04 DIAGNOSIS — C81.90 HODGKIN LYMPHOMA, UNSPECIFIED HODGKIN LYMPHOMA TYPE, UNSPECIFIED BODY REGION: ICD-10-CM

## 2025-04-04 DIAGNOSIS — R05.9 COUGH, UNSPECIFIED TYPE: ICD-10-CM

## 2025-04-04 DIAGNOSIS — C81.90 HODGKIN LYMPHOMA, UNSPECIFIED HODGKIN LYMPHOMA TYPE, UNSPECIFIED BODY REGION: Primary | ICD-10-CM

## 2025-04-04 DIAGNOSIS — C81.00 NODULAR LYMPHOCYTE PREDOMINANT HODGKIN LYMPHOMA, UNSPECIFIED BODY REGION: Primary | ICD-10-CM

## 2025-04-04 LAB
ALBUMIN SERPL-MCNC: 4.2 G/DL (ref 3.5–5.2)
ALBUMIN/GLOB SERPL: 1.3 G/DL
ALP SERPL-CCNC: 99 U/L (ref 39–117)
ALT SERPL W P-5'-P-CCNC: 60 U/L (ref 1–41)
ANION GAP SERPL CALCULATED.3IONS-SCNC: 13.6 MMOL/L (ref 5–15)
AST SERPL-CCNC: 31 U/L (ref 1–40)
BASOPHILS # BLD AUTO: 0.02 10*3/MM3 (ref 0–0.2)
BASOPHILS NFR BLD AUTO: 0.2 % (ref 0–1.5)
BILIRUB SERPL-MCNC: 0.3 MG/DL (ref 0–1.2)
BUN SERPL-MCNC: 12 MG/DL (ref 6–20)
BUN/CREAT SERPL: 11.9 (ref 7–25)
CALCIUM SPEC-SCNC: 9.1 MG/DL (ref 8.6–10.5)
CHLORIDE SERPL-SCNC: 104 MMOL/L (ref 98–107)
CO2 SERPL-SCNC: 21.4 MMOL/L (ref 22–29)
CREAT SERPL-MCNC: 1.01 MG/DL (ref 0.76–1.27)
DEPRECATED RDW RBC AUTO: 43.6 FL (ref 37–54)
EGFRCR SERPLBLD CKD-EPI 2021: 105.2 ML/MIN/1.73
EOSINOPHIL # BLD AUTO: 0.13 10*3/MM3 (ref 0–0.4)
EOSINOPHIL NFR BLD AUTO: 1.6 % (ref 0.3–6.2)
ERYTHROCYTE [DISTWIDTH] IN BLOOD BY AUTOMATED COUNT: 13.6 % (ref 12.3–15.4)
GLOBULIN UR ELPH-MCNC: 3.3 GM/DL
GLUCOSE SERPL-MCNC: 141 MG/DL (ref 65–99)
HCT VFR BLD AUTO: 44.4 % (ref 37.5–51)
HGB BLD-MCNC: 14.5 G/DL (ref 13–17.7)
LYMPHOCYTES # BLD AUTO: 2.26 10*3/MM3 (ref 0.7–3.1)
LYMPHOCYTES NFR BLD AUTO: 27.4 % (ref 19.6–45.3)
MCH RBC QN AUTO: 29.2 PG (ref 26.6–33)
MCHC RBC AUTO-ENTMCNC: 32.7 G/DL (ref 31.5–35.7)
MCV RBC AUTO: 89.3 FL (ref 79–97)
MONOCYTES # BLD AUTO: 0.56 10*3/MM3 (ref 0.1–0.9)
MONOCYTES NFR BLD AUTO: 6.8 % (ref 5–12)
NEUTROPHILS NFR BLD AUTO: 5.27 10*3/MM3 (ref 1.7–7)
NEUTROPHILS NFR BLD AUTO: 64 % (ref 42.7–76)
PLATELET # BLD AUTO: 217 10*3/MM3 (ref 140–450)
PMV BLD AUTO: 10.2 FL (ref 6–12)
POTASSIUM SERPL-SCNC: 3.9 MMOL/L (ref 3.5–5.2)
PROT SERPL-MCNC: 7.5 G/DL (ref 6–8.5)
RBC # BLD AUTO: 4.97 10*6/MM3 (ref 4.14–5.8)
SODIUM SERPL-SCNC: 139 MMOL/L (ref 136–145)
WBC NRBC COR # BLD AUTO: 8.24 10*3/MM3 (ref 3.4–10.8)

## 2025-04-04 PROCEDURE — 94726 PLETHYSMOGRAPHY LUNG VOLUMES: CPT

## 2025-04-04 PROCEDURE — 36591 DRAW BLOOD OFF VENOUS DEVICE: CPT

## 2025-04-04 PROCEDURE — 94729 DIFFUSING CAPACITY: CPT

## 2025-04-04 PROCEDURE — 80053 COMPREHEN METABOLIC PANEL: CPT | Performed by: INTERNAL MEDICINE

## 2025-04-04 PROCEDURE — 85025 COMPLETE CBC W/AUTO DIFF WBC: CPT | Performed by: INTERNAL MEDICINE

## 2025-04-04 PROCEDURE — 25010000002 HEPARIN LOCK FLUSH PER 10 UNITS: Performed by: INTERNAL MEDICINE

## 2025-04-04 PROCEDURE — 94010 BREATHING CAPACITY TEST: CPT

## 2025-04-04 RX ORDER — SODIUM CHLORIDE 0.9 % (FLUSH) 0.9 %
10 SYRINGE (ML) INJECTION AS NEEDED
Status: DISCONTINUED | OUTPATIENT
Start: 2025-04-04 | End: 2025-04-05 | Stop reason: HOSPADM

## 2025-04-04 RX ORDER — SODIUM CHLORIDE 0.9 % (FLUSH) 0.9 %
10 SYRINGE (ML) INJECTION AS NEEDED
OUTPATIENT
Start: 2025-04-04

## 2025-04-04 RX ORDER — HEPARIN SODIUM (PORCINE) LOCK FLUSH IV SOLN 100 UNIT/ML 100 UNIT/ML
500 SOLUTION INTRAVENOUS AS NEEDED
OUTPATIENT
Start: 2025-04-04

## 2025-04-04 RX ORDER — HEPARIN SODIUM (PORCINE) LOCK FLUSH IV SOLN 100 UNIT/ML 100 UNIT/ML
500 SOLUTION INTRAVENOUS AS NEEDED
Status: DISCONTINUED | OUTPATIENT
Start: 2025-04-04 | End: 2025-04-05 | Stop reason: HOSPADM

## 2025-04-04 RX ADMIN — HEPARIN 500 UNITS: 100 SYRINGE at 11:37

## 2025-04-04 RX ADMIN — Medication 10 ML: at 11:37

## 2025-04-04 NOTE — PATIENT INSTRUCTIONS
Nivolumab Injection  What is this medication?  NIVOLUMAB (sarah VOL ue mab) treats some types of cancer. It works by helping your immune system slow or stop the spread of cancer cells. It is a monoclonal antibody.    This medicine may be used for other purposes; ask your health care provider or pharmacist if you have questions.    COMMON BRAND NAME(S): Opdivo    What should I tell my care team before I take this medication?  They need to know if you have any of these conditions:    Allogeneic stem cell transplant (uses someone else's stem cells)  Autoimmune diseases, such as Crohn disease, ulcerative colitis, lupus  History of chest radiation  Nervous system problems, such as Guillain-Bro[place]land syndrome or myasthenia gravis  Organ transplant  An unusual or allergic reaction to nivolumab, other medications, foods, dyes, or preservatives  Pregnant or trying to get pregnant  Breast-feeding  How should I use this medication?  This medication is infused into a vein. It is given in a hospital or clinic setting.    A special MedGuide will be given to you before each treatment. Be sure to read this information carefully each time.    Talk to your care team about the use of this medication in children. While it may be prescribed for children as young as 12 years for selected conditions, precautions do apply.    Overdosage: If you think you have taken too much of this medicine contact a poison control center or emergency room at once.    NOTE: This medicine is only for you. Do not share this medicine with others.    What if I miss a dose?  Keep appointments for follow-up doses. It is important not to miss your dose. Call your care team if you are unable to keep an appointment.    What may interact with this medication?  Interactions have not been studied.    This list may not describe all possible interactions. Give your health care provider a list of all the medicines, herbs, non-prescription drugs, or dietary supplements you use.  Also tell them if you smoke, drink alcohol, or use illegal drugs. Some items may interact with your medicine.    What should I watch for while using this medication?  Your condition will be monitored carefully while you are receiving this medication. You may need blood work while taking this medication.    This medication may cause serious skin reactions. They can happen weeks to months after starting the medication. Contact your care team right away if you notice fevers or flu-like symptoms with a rash. The rash may be red or purple and then turn into blisters or peeling of the skin. You may also notice a red rash with swelling of the face, lips, or lymph nodes in your neck or under your arms.    Tell your care team right away if you have any change in your eyesight.    Talk to your care team if you are pregnant or think you might be pregnant. A negative pregnancy test is required before starting this medication. A reliable form of contraception is recommended while taking this medication and for 5 months after the last dose. Talk to your care team about effective forms of contraception.    Do not breast-feed while taking this medication and for 5 months after the last dose.    What side effects may I notice from receiving this medication?  Side effects that you should report to your care team as soon as possible:    Allergic reactions--skin rash, itching, hives, swelling of the face, lips, tongue, or throat  Dry cough, shortness of breath or trouble breathing  Eye pain, redness, irritation, or discharge with blurry or decreased vision  Heart muscle inflammation--unusual weakness or fatigue, shortness of breath, chest pain, fast or irregular heartbeat, dizziness, swelling of the ankles, feet, or hands  Hormone gland problems--headache, sensitivity to light, unusual weakness or fatigue, dizziness, fast or irregular heartbeat, increased sensitivity to cold or heat, excessive sweating, constipation, hair loss,  increased thirst or amount of urine, tremors or shaking, irritability  Infusion reactions--chest pain, shortness of breath or trouble breathing, feeling faint or lightheaded  Kidney injury (glomerulonephritis)--decrease in the amount of urine, red or dark brown urine, foamy or bubbly urine, swelling of the ankles, hands, or feet  Liver injury--right upper belly pain, loss of appetite, nausea, light-colored stool, dark yellow or brown urine, yellowing skin or eyes, unusual weakness or fatigue  Pain, tingling, or numbness in the hands or feet, muscle weakness, change in vision, confusion or trouble speaking, loss of balance or coordination, trouble walking, seizures  Rash, fever, and swollen lymph nodes  Redness, blistering, peeling, or loosening of the skin, including inside the mouth  Sudden or severe stomach pain, bloody diarrhea, fever, nausea, vomiting  Side effects that usually do not require medical attention (report these to your care team if they continue or are bothersome):    Bone, joint, or muscle pain  Diarrhea  Fatigue  Loss of appetite  Nausea  Skin rash  This list may not describe all possible side effects. Call your doctor for medical advice about side effects. You may report side effects to FDA at 2-810-GNF-4737.    Where should I keep my medication?  This medication is given in a hospital or clinic. It will not be stored at home.    NOTE: This sheet is a summary. It may not cover all possible information. If you have questions about this medicine, talk to your doctor, pharmacist, or health care provider.    © 2025 [employer]vier/Gold Standard (2023-04-17 00:00:00)    Additional Information From [email or web address] About Nivolumab  Self-Care Tips:  Drink at least two to three quarts of fluid every 24 hours, unless you are instructed otherwise.  Nivolumab can cause visual changes, dizziness and tiredness. If you have any of these symptoms, use caution when driving a car, using machinery, or anything that requires  you to be alert.  Wash your hands often.  Avoid contact sports or activities that could cause injury.  To reduce any potential nausea eat small, frequent meals. Sucking on lozenges and chewing gum may also help.  Eat foods that may help reduce diarrhea (see managing side effects - diarrhea).  Avoid sun exposure. Wear SPF 30 (or higher) sunblock and protective clothing.  In general, drinking alcoholic beverages should be kept to a minimum or avoided completely. You should discuss this with your doctor.  Get plenty of rest.  Maintain good nutrition. (see eating well during chemotherapy)  If you experience symptoms or side effects, be sure to discuss them with your health care team. They can prescribe medications and/or offer other suggestions that are effective in managing such problems.    When to contact your doctor or health care provider:  Contact your health care provider immediately, day or night, if you should experience any of the following symptoms:    Fever of 100.4° F (38° or higher, chills)  Signs of an allergic reaction, like rash; hives; itching; red, peeling, or blistered, skin with or without fever; tightness in the chest or throat; wheezing; trouble breathing or talking; unusual hoarseness; or swelling of the lips, mouth, face, throat, or tongue.  Always inform your health care provider if you experience any unusual symptoms.    The following symptoms require medical attention, but are not an emergency. Contact your health care provider within 24 hours of noticing any of the following:    Diarrhea should be reported to your health care provider  Nausea (interferes with ability to eat and unrelieved with prescribed medication)  Vomiting (vomiting more than 4-5 times in a 24 hour period)  Unable to eat or drink for 24 hours or have signs of dehydration: tiredness, thirst, dry mouth, dark and decrease amount of urine, or dizziness  Sudden change in eyesight  Sudden onset of shortness of breath,  accompanied by cough and/or fever  Skin or the whites of your eyes turn yellow  Light colored stools  Blood in stools  Dark, tarry or sticky stools  Urine turns dark or brown (tea color)  Blood in the urine  Big weight gain  Unable to pass urine or change in the amount of urine passed  Decreased appetite  Stomach pain or upset stomach  Bleed or bruise more easily than normal  Fast heartbeat  Cough with or without mucus  Any skin change, irritation, itching or rash  Very bad muscle or weakness  Very bad joint pain  Swelling in the arms or legs  Signs of trouble with your thyroid or pituitary gland (change in mood or the way you act, change in weight, constipation, dizziness, deeper voice, feeling cold, fainting, hair loss, feeling very tired, headache or loss of sex drive)  Always inform your health care provider if you experience any unusual symptoms.

## 2025-04-04 NOTE — PROGRESS NOTES
Pt here for labs and a f/u with a provider . Port accessed and flushed with good blood return noted. 10cc of blood wasted prior to specimen collection. Blood specimen obtained and sent to lab for processing per protocol. Port flushed with saline and heparin prior to needle removal. Pt sent back to waiting room and [facility] staff notified.

## 2025-04-07 ENCOUNTER — DOCUMENTATION (OUTPATIENT)
Dept: ONCOLOGY | Facility: CLINIC | Age: 25
End: 2025-04-07
Payer: COMMERCIAL

## 2025-04-07 RX ORDER — ALLOPURINOL 300 MG/1
300 TABLET ORAL DAILY
Qty: 30 TABLET | Refills: 6 | Status: SHIPPED | OUTPATIENT
Start: 2025-04-07

## 2025-04-07 NOTE — PROGRESS NOTES
Pt requested a referral be made for counseling services.  [facility] has no providers available at this time.  [facility] emailed pt a list of potential counseling providers in [facility].  Pt encouraged to call places of his choosing to get scheduled and to call [facility] if additional information is needed.

## 2025-04-08 ENCOUNTER — HOSPITAL ENCOUNTER (OUTPATIENT)
Dept: CT IMAGING | Facility: HOSPITAL | Age: 25
Discharge: HOME | End: 2025-04-08
Admitting: RADIOLOGY
Payer: COMMERCIAL

## 2025-04-08 VITALS
WEIGHT: 269 LBS | BODY MASS INDEX: 38.51 KG/M2 | OXYGEN SATURATION: 96 % | RESPIRATION RATE: 13 BRPM | TEMPERATURE: 98 F | HEIGHT: 70 IN | SYSTOLIC BLOOD PRESSURE: 122 MMHG | DIASTOLIC BLOOD PRESSURE: 80 MMHG | HEART RATE: 68 BPM

## 2025-04-08 DIAGNOSIS — C81.90 HODGKIN LYMPHOMA, UNSPECIFIED HODGKIN LYMPHOMA TYPE, UNSPECIFIED BODY REGION: ICD-10-CM

## 2025-04-08 LAB
APTT PPP: 32.1 SECONDS (ref 22.7–35.4)
BASOPHILS # BLD AUTO: 0.03 10*3/MM3 (ref 0–0.2)
BASOPHILS NFR BLD AUTO: 0.3 % (ref 0–1.5)
DEPRECATED RDW RBC AUTO: 41.8 FL (ref 37–54)
EOSINOPHIL # BLD AUTO: 0.11 10*3/MM3 (ref 0–0.4)
EOSINOPHIL NFR BLD AUTO: 1.2 % (ref 0.3–6.2)
ERYTHROCYTE [DISTWIDTH] IN BLOOD BY AUTOMATED COUNT: 13.2 % (ref 12.3–15.4)
HCT VFR BLD AUTO: 43.4 % (ref 37.5–51)
HGB BLD-MCNC: 14.1 G/DL (ref 13–17.7)
IMM GRANULOCYTES # BLD AUTO: 0.02 10*3/MM3 (ref 0–0.05)
IMM GRANULOCYTES NFR BLD AUTO: 0.2 % (ref 0–0.5)
INR PPP: 1.16 (ref 0.9–1.1)
LYMPHOCYTES # BLD AUTO: 2.23 10*3/MM3 (ref 0.7–3.1)
LYMPHOCYTES NFR BLD AUTO: 23.4 % (ref 19.6–45.3)
MCH RBC QN AUTO: 28.3 PG (ref 26.6–33)
MCHC RBC AUTO-ENTMCNC: 32.5 G/DL (ref 31.5–35.7)
MCV RBC AUTO: 87.1 FL (ref 79–97)
MONOCYTES # BLD AUTO: 0.67 10*3/MM3 (ref 0.1–0.9)
MONOCYTES NFR BLD AUTO: 7 % (ref 5–12)
NEUTROPHILS NFR BLD AUTO: 6.47 10*3/MM3 (ref 1.7–7)
NEUTROPHILS NFR BLD AUTO: 67.9 % (ref 42.7–76)
NRBC BLD AUTO-RTO: 0 /100 WBC (ref 0–0.2)
PLATELET # BLD AUTO: 224 10*3/MM3 (ref 140–450)
PMV BLD AUTO: 9.8 FL (ref 6–12)
PROTHROMBIN TIME: 14.8 SECONDS (ref 11.7–14.2)
RBC # BLD AUTO: 4.98 10*6/MM3 (ref 4.14–5.8)
WBC NRBC COR # BLD AUTO: 9.53 10*3/MM3 (ref 3.4–10.8)

## 2025-04-08 PROCEDURE — 25010000002 ONDANSETRON PER 1 MG

## 2025-04-08 PROCEDURE — 25010000002 HEPARIN LOCK FLUSH PER 10 UNITS

## 2025-04-08 PROCEDURE — 25810000003 SODIUM CHLORIDE 0.9 % SOLUTION: Performed by: RADIOLOGY

## 2025-04-08 PROCEDURE — 25010000002 FENTANYL CITRATE (PF) 50 MCG/ML SOLUTION

## 2025-04-08 PROCEDURE — 25010000002 MIDAZOLAM PER 1 MG

## 2025-04-08 PROCEDURE — 77012 CT SCAN FOR NEEDLE BIOPSY: CPT

## 2025-04-08 PROCEDURE — 85610 PROTHROMBIN TIME: CPT | Performed by: RADIOLOGY

## 2025-04-08 PROCEDURE — 85730 THROMBOPLASTIN TIME PARTIAL: CPT | Performed by: RADIOLOGY

## 2025-04-08 PROCEDURE — 85025 COMPLETE CBC W/AUTO DIFF WBC: CPT | Performed by: RADIOLOGY

## 2025-04-08 PROCEDURE — 99152 MOD SED SAME PHYS/QHP 5/>YRS: CPT

## 2025-04-08 PROCEDURE — C1830 POWER BONE MARROW BX NEEDLE: HCPCS

## 2025-04-08 RX ORDER — ONDANSETRON 2 MG/ML
INJECTION INTRAMUSCULAR; INTRAVENOUS AS NEEDED
Status: COMPLETED | OUTPATIENT
Start: 2025-04-08 | End: 2025-04-08

## 2025-04-08 RX ORDER — HEPARIN SODIUM (PORCINE) LOCK FLUSH IV SOLN 100 UNIT/ML 100 UNIT/ML
SOLUTION INTRAVENOUS
Status: COMPLETED
Start: 2025-04-08 | End: 2025-04-08

## 2025-04-08 RX ORDER — MIDAZOLAM HYDROCHLORIDE 1 MG/ML
INJECTION, SOLUTION INTRAMUSCULAR; INTRAVENOUS AS NEEDED
Status: COMPLETED | OUTPATIENT
Start: 2025-04-08 | End: 2025-04-08

## 2025-04-08 RX ORDER — HEPARIN SODIUM (PORCINE) LOCK FLUSH IV SOLN 100 UNIT/ML 100 UNIT/ML
500 SOLUTION INTRAVENOUS AS NEEDED
Status: DISCONTINUED | OUTPATIENT
Start: 2025-04-08 | End: 2025-04-09 | Stop reason: HOSPADM

## 2025-04-08 RX ORDER — SODIUM CHLORIDE 9 MG/ML
75 INJECTION, SOLUTION INTRAVENOUS CONTINUOUS
Status: DISPENSED | OUTPATIENT
Start: 2025-04-08 | End: 2025-04-08

## 2025-04-08 RX ORDER — FENTANYL CITRATE 50 UG/ML
INJECTION, SOLUTION INTRAMUSCULAR; INTRAVENOUS AS NEEDED
Status: COMPLETED | OUTPATIENT
Start: 2025-04-08 | End: 2025-04-08

## 2025-04-08 RX ADMIN — SODIUM CHLORIDE 75 ML/HR: 9 INJECTION, SOLUTION INTRAVENOUS at 12:07

## 2025-04-08 RX ADMIN — FENTANYL CITRATE 100 MCG: 50 INJECTION, SOLUTION INTRAMUSCULAR; INTRAVENOUS at 12:53

## 2025-04-08 RX ADMIN — Medication: at 13:54

## 2025-04-08 RX ADMIN — ONDANSETRON 4 MG: 2 INJECTION INTRAMUSCULAR; INTRAVENOUS at 12:53

## 2025-04-08 RX ADMIN — MIDAZOLAM 1 MG: 1 INJECTION INTRAMUSCULAR; INTRAVENOUS at 12:57

## 2025-04-08 RX ADMIN — FENTANYL CITRATE 50 MCG: 50 INJECTION, SOLUTION INTRAMUSCULAR; INTRAVENOUS at 12:57

## 2025-04-08 RX ADMIN — MIDAZOLAM 1 MG: 1 INJECTION INTRAMUSCULAR; INTRAVENOUS at 12:53

## 2025-04-08 NOTE — DISCHARGE INSTRUCTIONS
A responsible adult should stay with you and you should rest quietly for the rest of the day. Do not drink alcohol, drive or cook for 24 hours following your procedure.  Progress your diet as tolerated.  Resume your usual medications including aspirin.  When you remove your dressing in 48 hours, a small amount of blood is to be expected. Do not be alarmed.  If you feel it is bleeding excessively apply pressure and proceed to the Emergency room.  Do not shower, bath, or get your dressing wet at all for 48 hours.  You may shower after the dressing is removed. No lifting more that 10 pounds for 48 hours.  If severe pain, increased shortness of air or racing heartbeat occur, seek immediate medical attention.  Follow up with Dr. Mehta for results.

## 2025-04-08 NOTE — H&P
[facility]   Interventional Radiology H&P    Patient Name: Johnny Miller  : 2000  MRN: [id number]  Primary Care Physician:  Graciela Cotter APRN  Referring Physician: Alethea Mehta, *  Date of admission: 2025    Subjective   Subjective     HPI:  Johnny Miller is a 24 y.o. male with hodgkin lymphoma.    Review of Systems:   Constitutional no fever,  no weight loss       Otolaryngeal no difficulty swallowing   Cardiovascular no chest pain   Pulmonary no cough, no sputum production   Gastrointestinal no constipation, no diarrhea                         Personal History       Past Medical/Surgical History:   Past Medical History:   Diagnosis Date    Acne     ADHD     Supraclavicular mass      Past Surgical History:   Procedure Laterality Date    CERVICAL LYMPH NODE BIOPSY/EXCISION Right 3/10/2025    Procedure: Right cervical mass/lymph node excisional biopsy;  Surgeon: Cami Bradford MD;  Location: [facility];  Service: General;  Laterality: Right;    NO PAST SURGERIES      VENOUS ACCESS DEVICE (PORT) INSERTION Left 3/25/2025    Procedure: Port-A-Cath/PowerPort placement;  Surgeon: Cami Bradford MD;  Location: [facility];  Service: General;  Laterality: Left;       Social History:  reports that he has never smoked. He has never been exposed to tobacco smoke. He has never used smokeless tobacco. He reports current alcohol use of about 2.0 standard drinks of alcohol per week. He reports that he does not use drugs.    Medications:  (Not in a hospital admission)    Current medications:     Current IV drips:  sodium chloride, 75 mL/hr, Last Rate: 75 mL/hr (25 1207)        Allergies:  No Known Allergies    Objective    Objective     Vitals:   Temp:  [98 °F (36.7 °C)] 98 °F (36.7 °C)  Heart Rate:  [85] 85  Resp:  [27] 27  BP: (119)/(74) 119/74      Physical Exam:   Constitutional: Awake, alert, No acute distress    Respiratory: Clear to auscultation bilaterally, nonlabored respirations  "   Cardiovascular: RRR, no murmurs, rubs, or gallops, palpable pedal pulses bilaterally   Gastrointestinal: Positive bowel sounds, soft, nontender, nondistended        ASA SCALE ASSESSMENT:  2-Mild to moderate systemic disease, medically well controlled, with no functional limitation    MALLAMPATI CLASSIFICATION:  2-Able to visualize the soft palate, fauces, uvula. The anterior & posterior tonsilar pillars are hidden by the tongue.       Result Review        Result Review:     No results found for: \"NA\"    No results found for: \"K\"    No results found for: \"CL\"    No results found for: \"PLASMABICARB\"    No results found for: \"BUN\"    No results found for: \"CREATININE\"    No results found for: \"CALCIUM\"        No components found for: \"GLUCOSE.*\"  Results from last 7 days   Lab Units 04/08/25  1108   WBC 10*3/mm3 9.53   HEMOGLOBIN g/dL 14.1   HEMATOCRIT % 43.4   PLATELETS 10*3/mm3 224      Results from last 7 days   Lab Units 04/08/25  1108   INR  1.16*           Assessment / Plan     Assesment:   Hodgkin lymphoma.      Plan:   CT guided bone marrow aspiration and biopsy.     The risks and benefits of the procedure were discussed with the patient.    Electronically signed by CINDI Marroquin, 04/08/25, 12:49 PM EDT.  "

## 2025-04-09 LAB — Lab: NORMAL

## 2025-04-14 LAB — BLOOD OR BONE MARROW RESULT: NORMAL

## 2025-04-15 ENCOUNTER — HOSPITAL ENCOUNTER (OUTPATIENT)
Dept: ONCOLOGY | Facility: HOSPITAL | Age: 25
Discharge: HOME | End: 2025-04-15
Payer: COMMERCIAL

## 2025-04-15 ENCOUNTER — OFFICE VISIT (OUTPATIENT)
Dept: PHARMACY | Facility: HOSPITAL | Age: 25
End: 2025-04-15
Payer: COMMERCIAL

## 2025-04-15 VITALS
OXYGEN SATURATION: 96 % | BODY MASS INDEX: 38.51 KG/M2 | RESPIRATION RATE: 14 BRPM | SYSTOLIC BLOOD PRESSURE: 113 MMHG | DIASTOLIC BLOOD PRESSURE: 77 MMHG | HEIGHT: 70 IN | TEMPERATURE: 97.5 F | WEIGHT: 269 LBS | HEART RATE: 59 BPM

## 2025-04-15 DIAGNOSIS — C81.90 HODGKIN LYMPHOMA, UNSPECIFIED HODGKIN LYMPHOMA TYPE, UNSPECIFIED BODY REGION: Primary | ICD-10-CM

## 2025-04-15 LAB
ALP BLD-CCNC: 76 U/L (ref 53–128)
BASOPHILS # BLD AUTO: 0.02 10*3/MM3 (ref 0–0.2)
BASOPHILS NFR BLD AUTO: 0.4 % (ref 0–1.5)
BUN BLDA-MCNC: 13 MG/DL (ref 7–22)
CALCIUM BLD QL: 9 MG/DL (ref 8–10.3)
CHLORIDE BLDA-SCNC: 107 MMOL/L (ref 98–108)
CO2 BLDA-SCNC: 26 MMOL/L (ref 18–33)
CREAT BLDA-MCNC: 1.1 MG/DL (ref 0.6–1.2)
DEPRECATED RDW RBC AUTO: 40.8 FL (ref 37–54)
EGFRCR SERPLBLD CKD-EPI 2021: 94.9 ML/MIN/1.73
EOSINOPHIL # BLD AUTO: 0.16 10*3/MM3 (ref 0–0.4)
EOSINOPHIL NFR BLD AUTO: 2.8 % (ref 0.3–6.2)
ERYTHROCYTE [DISTWIDTH] IN BLOOD BY AUTOMATED COUNT: 12.8 % (ref 12.3–15.4)
GLUCOSE BLDC GLUCOMTR-MCNC: 208 MG/DL (ref 73–118)
HCT VFR BLD AUTO: 42.2 % (ref 37.5–51)
HGB BLD-MCNC: 13.8 G/DL (ref 13–17.7)
LYMPHOCYTES # BLD AUTO: 2.16 10*3/MM3 (ref 0.7–3.1)
LYMPHOCYTES NFR BLD AUTO: 38.4 % (ref 19.6–45.3)
MCH RBC QN AUTO: 28.8 PG (ref 26.6–33)
MCHC RBC AUTO-ENTMCNC: 32.7 G/DL (ref 31.5–35.7)
MCV RBC AUTO: 88.1 FL (ref 79–97)
MONOCYTES # BLD AUTO: 0.5 10*3/MM3 (ref 0.1–0.9)
MONOCYTES NFR BLD AUTO: 8.9 % (ref 5–12)
NEUTROPHILS NFR BLD AUTO: 2.79 10*3/MM3 (ref 1.7–7)
NEUTROPHILS NFR BLD AUTO: 49.5 % (ref 42.7–76)
PLATELET # BLD AUTO: 238 10*3/MM3 (ref 140–450)
PMV BLD AUTO: 10 FL (ref 6–12)
POC ALBUMIN: 3.3 G/L (ref 3.3–5.5)
POC ALT (SGPT): 44 U/L (ref 10–47)
POC AST (SGOT): 30 U/L (ref 11–38)
POC TOTAL BILIRUBIN: 0.5 MG/DL (ref 0.2–1.6)
POC TOTAL PROTEIN: 6.9 G/DL (ref 6.4–8.1)
POTASSIUM BLDA-SCNC: 3.7 MMOL/L (ref 3.6–5.1)
RBC # BLD AUTO: 4.79 10*6/MM3 (ref 4.14–5.8)
SODIUM BLD-SCNC: 142 MMOL/L (ref 128–145)
T4 SERPL-MCNC: 6.85 MCG/DL (ref 4.5–11.7)
TSH SERPL DL<=0.05 MIU/L-ACNC: 3.01 UIU/ML (ref 0.27–4.2)
WBC NRBC COR # BLD AUTO: 5.63 10*3/MM3 (ref 3.4–10.8)

## 2025-04-15 PROCEDURE — 25010000002 PALONOSETRON PER 25 MCG: Performed by: INTERNAL MEDICINE

## 2025-04-15 PROCEDURE — 25010000002 DOXORUBICIN PER 10 MG: Performed by: INTERNAL MEDICINE

## 2025-04-15 PROCEDURE — 25010000002 NIVOLUMAB 240 MG/24ML SOLUTION 24 ML VIAL: Performed by: INTERNAL MEDICINE

## 2025-04-15 PROCEDURE — 25010000002 FOSAPREPITANT PER 1 MG: Performed by: INTERNAL MEDICINE

## 2025-04-15 PROCEDURE — 25010000002 DACARBAZINE PER 200 MG: Performed by: INTERNAL MEDICINE

## 2025-04-15 PROCEDURE — 96417 CHEMO IV INFUS EACH ADDL SEQ: CPT

## 2025-04-15 PROCEDURE — 96411 CHEMO IV PUSH ADDL DRUG: CPT

## 2025-04-15 PROCEDURE — 80053 COMPREHEN METABOLIC PANEL: CPT

## 2025-04-15 PROCEDURE — 25010000002 DACARBAZINE PER 100 MG: Performed by: INTERNAL MEDICINE

## 2025-04-15 PROCEDURE — 25010000002 VINBLASTINE PER 1 MG: Performed by: INTERNAL MEDICINE

## 2025-04-15 PROCEDURE — 85025 COMPLETE CBC W/AUTO DIFF WBC: CPT | Performed by: INTERNAL MEDICINE

## 2025-04-15 PROCEDURE — 96367 TX/PROPH/DG ADDL SEQ IV INF: CPT

## 2025-04-15 PROCEDURE — 25010000002 DEXAMETHASONE PER 1 MG: Performed by: INTERNAL MEDICINE

## 2025-04-15 PROCEDURE — 25810000003 SODIUM CHLORIDE 0.9 % SOLUTION 250 ML FLEX CONT: Performed by: INTERNAL MEDICINE

## 2025-04-15 PROCEDURE — 25810000003 SODIUM CHLORIDE 0.9 % SOLUTION: Performed by: INTERNAL MEDICINE

## 2025-04-15 PROCEDURE — 25010000002 HEPARIN LOCK FLUSH PER 10 UNITS: Performed by: INTERNAL MEDICINE

## 2025-04-15 PROCEDURE — 96413 CHEMO IV INFUSION 1 HR: CPT

## 2025-04-15 PROCEDURE — 96375 TX/PRO/DX INJ NEW DRUG ADDON: CPT

## 2025-04-15 PROCEDURE — 84443 ASSAY THYROID STIM HORMONE: CPT | Performed by: INTERNAL MEDICINE

## 2025-04-15 PROCEDURE — 84436 ASSAY OF TOTAL THYROXINE: CPT | Performed by: INTERNAL MEDICINE

## 2025-04-15 RX ORDER — HEPARIN SODIUM (PORCINE) LOCK FLUSH IV SOLN 100 UNIT/ML 100 UNIT/ML
500 SOLUTION INTRAVENOUS AS NEEDED
Status: CANCELLED | OUTPATIENT
Start: 2025-04-15

## 2025-04-15 RX ORDER — OLANZAPINE 5 MG/1
5 TABLET ORAL NIGHTLY
Qty: 8 TABLET | Refills: 5 | Status: SHIPPED | OUTPATIENT
Start: 2025-04-15

## 2025-04-15 RX ORDER — DOXORUBICIN HYDROCHLORIDE 2 MG/ML
25 INJECTION, SOLUTION INTRAVENOUS ONCE
Status: COMPLETED | OUTPATIENT
Start: 2025-04-15 | End: 2025-04-15

## 2025-04-15 RX ORDER — PALONOSETRON 0.05 MG/ML
0.25 INJECTION, SOLUTION INTRAVENOUS ONCE
Status: COMPLETED | OUTPATIENT
Start: 2025-04-15 | End: 2025-04-15

## 2025-04-15 RX ORDER — PALONOSETRON 0.05 MG/ML
0.25 INJECTION, SOLUTION INTRAVENOUS ONCE
Status: CANCELLED | OUTPATIENT
Start: 2025-04-15

## 2025-04-15 RX ORDER — DEXAMETHASONE SODIUM PHOSPHATE 4 MG/ML
12 INJECTION, SOLUTION INTRA-ARTICULAR; INTRALESIONAL; INTRAMUSCULAR; INTRAVENOUS; SOFT TISSUE ONCE
Status: COMPLETED | OUTPATIENT
Start: 2025-04-15 | End: 2025-04-15

## 2025-04-15 RX ORDER — CETIRIZINE HYDROCHLORIDE 10 MG/1
10 TABLET ORAL DAILY
COMMUNITY

## 2025-04-15 RX ORDER — SODIUM CHLORIDE 9 MG/ML
20 INJECTION, SOLUTION INTRAVENOUS ONCE
Status: COMPLETED | OUTPATIENT
Start: 2025-04-15 | End: 2025-04-15

## 2025-04-15 RX ORDER — SODIUM CHLORIDE 0.9 % (FLUSH) 0.9 %
10 SYRINGE (ML) INJECTION AS NEEDED
Status: CANCELLED | OUTPATIENT
Start: 2025-04-15

## 2025-04-15 RX ORDER — SODIUM CHLORIDE 0.9 % (FLUSH) 0.9 %
10 SYRINGE (ML) INJECTION AS NEEDED
Status: DISCONTINUED | OUTPATIENT
Start: 2025-04-15 | End: 2025-04-16 | Stop reason: HOSPADM

## 2025-04-15 RX ORDER — ONDANSETRON 8 MG/1
8 TABLET, FILM COATED ORAL 3 TIMES DAILY PRN
Qty: 30 TABLET | Refills: 5 | Status: SHIPPED | OUTPATIENT
Start: 2025-04-15

## 2025-04-15 RX ORDER — HEPARIN SODIUM (PORCINE) LOCK FLUSH IV SOLN 100 UNIT/ML 100 UNIT/ML
500 SOLUTION INTRAVENOUS AS NEEDED
Status: DISCONTINUED | OUTPATIENT
Start: 2025-04-15 | End: 2025-04-16 | Stop reason: HOSPADM

## 2025-04-15 RX ORDER — SODIUM CHLORIDE 9 MG/ML
20 INJECTION, SOLUTION INTRAVENOUS ONCE
Status: CANCELLED | OUTPATIENT
Start: 2025-04-15

## 2025-04-15 RX ORDER — DOXORUBICIN HYDROCHLORIDE 2 MG/ML
25 INJECTION, SOLUTION INTRAVENOUS ONCE
Status: CANCELLED | OUTPATIENT
Start: 2025-04-15

## 2025-04-15 RX ADMIN — SODIUM CHLORIDE 240 MG: 900 INJECTION, SOLUTION INTRAVENOUS at 11:46

## 2025-04-15 RX ADMIN — FOSAPREPITANT 100 ML: 150 INJECTION, POWDER, LYOPHILIZED, FOR SOLUTION INTRAVENOUS at 09:56

## 2025-04-15 RX ADMIN — DOXORUBICIN HYDROCHLORIDE 60 MG: 2 INJECTION, SOLUTION INTRAVENOUS at 10:34

## 2025-04-15 RX ADMIN — DEXAMETHASONE SODIUM PHOSPHATE 12 MG: 4 INJECTION INTRA-ARTICULAR; INTRALESIONAL; INTRAMUSCULAR; INTRAVENOUS; SOFT TISSUE at 09:52

## 2025-04-15 RX ADMIN — DACARBAZINE 890 MG: 10 INJECTION, POWDER, FOR SOLUTION INTRAVENOUS at 11:08

## 2025-04-15 RX ADMIN — VINBLASTINE SULFATE 14 MG: 1 INJECTION INTRAVENOUS at 10:48

## 2025-04-15 RX ADMIN — HEPARIN 500 UNITS: 100 SYRINGE at 12:19

## 2025-04-15 RX ADMIN — Medication 10 ML: at 12:19

## 2025-04-15 RX ADMIN — SODIUM CHLORIDE 20 ML/HR: 9 INJECTION, SOLUTION INTRAVENOUS at 09:45

## 2025-04-15 RX ADMIN — PALONOSETRON 0.25 MG: 0.05 INJECTION, SOLUTION INTRAVENOUS at 09:49

## 2025-04-15 NOTE — PROGRESS NOTES
Patient in clinic for C1 D1 Opdivo/Doxorubicin/Vinblastine/DTIC.  Port accessed with sterile technique and positive blood return noted.  Blood drawn from port.  10 cc blood wasted prior to specimen collection. Specimens sent to lab for processing. Patient feels well today; he has some intermittent nausea related to nervousness today. No complaints. Patient given treatment per MAR. Patient tolerated treatment; he did complaint of fatigue nursing home through treatment but no other symptoms. Patient discharged with AVS.

## 2025-04-15 NOTE — PROGRESS NOTES
Pharmacy Patient Education Note          Johnny Miller is a 24 y.o. male being seen at [facility] by Dr. Mehta for a diagnosis of Hodgkin Lymphoma with a plan to begin treatment with Nivolumab.     Patient previously education on Doxorubicin, Dacarbazine, Vinblastine and Bleomycin. Treatment plan was changed to remove bleomycin and add Nivolumab which is focus of today's discussion.     Pharmacist reviewed regimen, as detailed below, with patient.     The discussion included the dose, route, and frequency of administration. Most common side and clinically significant effects were discussed including diarrhea, fatigue, nausea/vomiting, skin rash, Muscle pain/weakness, Liver damage, Lung Damage, Kidney damage, Endocrine dysfunction, and Immune mediated side effects.      Precautions reviewed include:   Infection prevention precautions were reviewed including hand washing, food safety and the avoidance of crowds/use of mask.   Bleeding precautions including the use of soft bristle toothbrush, electric razor and precautions against falls were discussed. Importance of appropriate hygiene and self-care for nausea, anorexia, and mucositis reviewed.  Anti-emetic prevention and treatment with ondansetron and olanzapine    Patient was counseled on when to notify a provider including in the event of the following:  Signs and symptoms of infection, including temperature >100.4  Signs and symptoms of serious bleeding including blood in the urine or stool  Changes in urinary output  Frequent nausea/vomiting or diarrhea or severe abdominal pain  Development of mouth sores or ulcerations  The patient was provided with general information on when to call the office for adverse events.       Patient provided with handout regarding medications, and reviewed general plan for immunotherapy administration. Patient engaged in counseling throughout. Allowed time for patient to ask questions. Patient without any further  questions at this time and verbalized understanding.    Brittany Nieves [facility]  11:47 EDT

## 2025-04-15 NOTE — PATIENT INSTRUCTIONS
Allopurinol, 1 tablet daily  Zyprexa (Olanzapine), 1 tablet nightly for 4 nights, starting the day of chemo; repeat in 2 weeks with chemo  Ondansetron (Zofran), 1 tablet, every 8 hours as needed

## 2025-04-16 ENCOUNTER — TELEPHONE (OUTPATIENT)
Dept: ONCOLOGY | Facility: CLINIC | Age: 25
End: 2025-04-16

## 2025-04-16 LAB — CYTOGENETICS RESULT: NORMAL

## 2025-04-16 NOTE — TELEPHONE ENCOUNTER
Caller: Johnny Miller    Relationship: Self    Best call back number:   Telephone Information:   Mobile [phone]       What was the call regarding: PATIENT IS EXPERIENCING VERTIGO FROM THE CHEMO INFUSION FROM 4/15/2025.    CALL TO ADVISE     Is it okay if the provider responds through MyChart: NO

## 2025-04-17 LAB
LAB AP CASE REPORT: NORMAL
LAB AP CYTOGENETICS REPORT,ADDENDUM: NORMAL
LAB AP DIAGNOSIS COMMENT: NORMAL
LAB AP FLOW CYTOMETRY SUMMARY: NORMAL
PATH REPORT.FINAL DX SPEC: NORMAL
PATH REPORT.GROSS SPEC: NORMAL

## 2025-04-17 NOTE — TELEPHONE ENCOUNTER
Spoke w/ pt and let him know that that is a side effect of his treatment.  Instructed pt to push po fluids to stay hydrated.  Instructed him to call back if dizziness does not get better.  Pt v/u.

## 2025-04-18 ENCOUNTER — TELEPHONE (OUTPATIENT)
Dept: ONCOLOGY | Facility: CLINIC | Age: 25
End: 2025-04-18
Payer: COMMERCIAL

## 2025-04-18 NOTE — PROGRESS NOTES
Hematology/Oncology Outpatient Follow Up    PATIENT NAME:Johnny Miller  :2000  MRN: [id number]  PRIMARY CARE PHYSICIAN: Graciela Cotter APRN  REFERRING PHYSICIAN: No ref. provider found    Chief Complaint   Patient presents with    Follow-up     Hodgkin lymphoma, unspecified Hodgkin lymphoma type, unspecified body region            HISTORY OF PRESENT ILLNESS:     24-year-old male who has been referred secondary to right supraclavicular mass.  Patient felt a lump on the right neck over the past 4 months.  At the time he noticed it was around the time he had COVID-19 infection in 2024.  He denies night sweats weight loss or fatigue symptoms.  Patient does not smoke.  He drinks only 1 beer per week.  As far as he knows there is no family history of cancer.  States that the mass on the right neck is not painful.     For that reason patient had an ultrasound of the neck completed in 2025 which basically revealed a 3.9 cm well-circumscribed mass right supraclavicular area suspicious characteristics.    For that reason he has been referred to us for further evaluation and management        Patient is alone today for this appointment.    2024: patient had CT scan of the neck and chest with basically revealed right supraclavicular mass measuring approximately 5 x 3 cm enlarged pathologic lymphadenopathy within the mediastinum partially visualized upper retroperitoneal area.  The above findings suggest lymphoproliferative disease  3/19/2025: Patient had additional labs including HIV which was negative LDH was normal, uric acid was normal, sed rate was 27 his white count is 7, hemoglobin 14.4 platelets are 268  4/3/2025: Patient had a PET CT scan which basically revealed hypermetabolic lymphadenopathy in the neck, chest abdomen   2025:: Patient had bone marrow aspiration and biopsy which did not show any evidence of lymphoma involvement.  Patient has hypocellularity of the bone marrow storage  iron is not identified flow cytometry was unremarkable and cytogenetics showed a normal male karyotype  4/15/2025: Patient received cycle 1 of combination chemotherapy with nivolumab AVD  Past Medical History:   Diagnosis Date    Acne     ADHD     Supraclavicular mass        Past Surgical History:   Procedure Laterality Date    CERVICAL LYMPH NODE BIOPSY/EXCISION Right 3/10/2025    Procedure: Right cervical mass/lymph node excisional biopsy;  Surgeon: Cami Bradford MD;  Location: [facility] MAIN OR;  Service: General;  Laterality: Right;    NO PAST SURGERIES      VENOUS ACCESS DEVICE (PORT) INSERTION Left 3/25/2025    Procedure: Port-A-Cath/PowerPort placement;  Surgeon: Cami Bradford MD;  Location: [facility] MAIN OR;  Service: General;  Laterality: Left;         Current Outpatient Medications:     allopurinol (Zyloprim) 300 MG tablet, Take 1 tablet by mouth Daily., Disp: 30 tablet, Rfl: 6    ASHWAGANDHA PO, Take 600 mg by mouth. (Patient not taking: Reported on 4/15/2025), Disp: , Rfl:     buPROPion XL (WELLBUTRIN XL) 150 MG 24 hr tablet, Take 1 tablet by mouth Every Morning., Disp: , Rfl:     cetirizine (zyrTEC) 10 MG tablet, Take 1 tablet by mouth Daily., Disp: , Rfl:     cholecalciferol (Vitamin D) 25 MCG (1000 UT) tablet, , Disp: , Rfl:     ferrous sulfate 325 (65 FE) MG tablet, Take 1 tablet by mouth Daily With Breakfast., Disp: 30 tablet, Rfl: 3    lidocaine-prilocaine (EMLA) 2.5-2.5 % cream, Apply 1 Application topically to the appropriate area as directed As Needed for Mild Pain (one hour prior to port acess) for up to 30 days., Disp: 1 each, Rfl: 2    melatonin 5 MG tablet tablet, Take  by mouth., Disp: , Rfl:     OLANZapine (ZyPREXA) 5 MG tablet, Take 1 tablet by mouth Every Night. Take 1 tablet at night on Days 1, 2, 3 and 4 and Days 15, 16, 17 and 18., Disp: 8 tablet, Rfl: 5    ondansetron (ZOFRAN) 8 MG tablet, Take 1 tablet by mouth 3 (Three) Times a Day As Needed for Nausea or Vomiting., Disp: 30 tablet,  Rfl: 5    pyridoxine ([employer] B6) 100 MG tablet, Take 1 tablet by mouth Daily., Disp: , Rfl:     Zinc 50 MG tablet, Take  by mouth., Disp: , Rfl:   No current facility-administered medications for this visit.    Facility-Administered Medications Ordered in Other Visits:     heparin injection 500 Units, 500 Units, Intravenous, Janine CASTANEDA Ifeoma Roseline, MD, 500 Units at 04/21/25 1253    sodium chloride 0.9 % flush 10 mL, 10 mL, Intravenous, PRNJanine Ifeoma Roseline, MD, 10 mL at 04/21/25 1253    No Known Allergies    Family History   Problem Relation Age of Onset    Alcohol abuse Father     Diabetes Father        Cancer-related family history is not on file.    Social History     Tobacco Use    Smoking status: Never     Passive exposure: Never    Smokeless tobacco: Never   Vaping Use    Vaping status: Never Used   Substance Use Topics    Alcohol use: Yes     Alcohol/week: 2.0 standard drinks of alcohol     Types: 2 Cans of beer per week    Drug use: Never       I have reviewed and confirmed the accuracy of the patient's history: Chief complaint, HPI, ROS, and Subjective as entered by the MA/LPN/RN. Alethea Mehta MD 04/21/25      SUBJECTIVE:     He denies B symptoms.  Today to finalize his treatment plans.  He has a left Mediport.    Patient has had first cycle of chemotherapy.  He had some nausea he also complains of fatigue    REVIEW OF SYSTEMS:  Review of Systems   Constitutional:  Negative for chills, fatigue and fever.   HENT:  Negative for congestion, drooling, ear discharge, rhinorrhea, sinus pressure and tinnitus.    Eyes:  Negative for photophobia, pain and discharge.   Respiratory:  Negative for apnea, choking and stridor.    Cardiovascular:  Negative for palpitations.   Gastrointestinal:  Negative for abdominal distention, abdominal pain and anal bleeding.   Endocrine: Negative for polydipsia and polyphagia.   Genitourinary:  Negative for decreased urine volume, flank pain and genital sores.  "  Musculoskeletal:  Negative for gait problem, neck pain and neck stiffness.   Skin:  Negative for color change, rash and wound.   Neurological:  Negative for tremors, seizures, syncope, facial asymmetry and speech difficulty.   Hematological:  Negative for adenopathy.   Psychiatric/Behavioral:  Negative for agitation, confusion, hallucinations and self-injury. The patient is not hyperactive.        OBJECTIVE:    Vitals:    04/21/25 1337   BP: 128/85   Pulse: 119   Resp: 18   Temp: 97.6 °F (36.4 °C)   TempSrc: Temporal   SpO2: 95%   Weight: 121 kg (267 lb)   Height: 177.8 cm (70\")   PainSc: 0-No pain         Body mass index is 38.31 kg/m².    ECOG  (0) Fully active, able to carry on all predisease performance without restriction    Physical Exam  Vitals and nursing note reviewed.   Constitutional:       General: He is not in acute distress.     Appearance: He is not diaphoretic.   HENT:      Head: Normocephalic and atraumatic.   Eyes:      General: No scleral icterus.        Right eye: No discharge.         Left eye: No discharge.      Conjunctiva/sclera: Conjunctivae normal.   Neck:      Thyroid: No thyromegaly.   Cardiovascular:      Rate and Rhythm: Normal rate and regular rhythm.      Heart sounds: Normal heart sounds.      No friction rub. No gallop.   Pulmonary:      Effort: Pulmonary effort is normal. No respiratory distress.      Breath sounds: No stridor. No wheezing.   Abdominal:      General: Bowel sounds are normal.      Palpations: Abdomen is soft. There is no mass.      Tenderness: There is no abdominal tenderness. There is no guarding or rebound.   Musculoskeletal:         General: No tenderness. Normal range of motion.      Cervical back: Normal range of motion and neck supple.   Lymphadenopathy:      Cervical: No cervical adenopathy.   Skin:     General: Skin is warm.      Findings: No erythema or rash.   Neurological:      Mental Status: He is alert and oriented to person, place, and time.      " Motor: No abnormal muscle tone.   Psychiatric:         Behavior: Behavior normal.         Right Supraclavicular zayra enlargement    Left Mediport in place    RECENT LABS  WBC   Date Value Ref Range Status   04/21/2025 3.36 (L) 3.40 - 10.80 10*3/mm3 Final     RBC   Date Value Ref Range Status   04/21/2025 4.56 4.14 - 5.80 10*6/mm3 Final     Hemoglobin   Date Value Ref Range Status   04/21/2025 13.4 13.0 - 17.7 g/dL Final     Hematocrit   Date Value Ref Range Status   04/21/2025 39.6 37.5 - 51.0 % Final     MCV   Date Value Ref Range Status   04/21/2025 86.8 79.0 - 97.0 fL Final     MCH   Date Value Ref Range Status   04/21/2025 29.4 26.6 - 33.0 pg Final     MCHC   Date Value Ref Range Status   04/21/2025 33.8 31.5 - 35.7 g/dL Final     RDW   Date Value Ref Range Status   04/21/2025 12.2 (L) 12.3 - 15.4 % Final     RDW-SD   Date Value Ref Range Status   04/21/2025 38.5 37.0 - 54.0 fl Final     MPV   Date Value Ref Range Status   04/21/2025 10.2 6.0 - 12.0 fL Final     Platelets   Date Value Ref Range Status   04/21/2025 209 140 - 450 10*3/mm3 Final     Neutrophil %   Date Value Ref Range Status   04/21/2025 61.3 42.7 - 76.0 % Final     Lymphocyte %   Date Value Ref Range Status   04/21/2025 35.1 19.6 - 45.3 % Final     Monocyte %   Date Value Ref Range Status   04/21/2025 1.5 (L) 5.0 - 12.0 % Final     Eosinophil %   Date Value Ref Range Status   04/21/2025 1.8 0.3 - 6.2 % Final     Basophil %   Date Value Ref Range Status   04/21/2025 0.3 0.0 - 1.5 % Final     Immature Grans %   Date Value Ref Range Status   04/08/2025 0.2 0.0 - 0.5 % Final     Neutrophils, Absolute   Date Value Ref Range Status   04/21/2025 2.06 1.70 - 7.00 10*3/mm3 Final     Lymphocytes, Absolute   Date Value Ref Range Status   04/21/2025 1.18 0.70 - 3.10 10*3/mm3 Final     Monocytes, Absolute   Date Value Ref Range Status   04/21/2025 0.05 (L) 0.10 - 0.90 10*3/mm3 Final     Eosinophils, Absolute   Date Value Ref Range Status   04/21/2025 0.06  0.00 - 0.40 10*3/mm3 Final     Basophils, Absolute   Date Value Ref Range Status   04/21/2025 0.01 0.00 - 0.20 10*3/mm3 Final     Immature Grans, Absolute   Date Value Ref Range Status   04/08/2025 0.02 0.00 - 0.05 10*3/mm3 Final     nRBC   Date Value Ref Range Status   04/08/2025 0.0 0.0 - 0.2 /100 WBC Final       Lab Results   Component Value Date    GLUCOSE 141 (H) 04/04/2025    BUN 12 04/04/2025    CREATININE 1.10 04/15/2025    EGFRIFNONA 82 02/05/2021    BCR 11.9 04/04/2025    K 3.9 04/04/2025    CO2 21.4 (L) 04/04/2025    CALCIUM 9.1 04/04/2025    ALBUMIN 4.2 04/04/2025    AST 31 04/04/2025    ALT 60 (H) 04/04/2025         Assessment & Plan     Hodgkin lymphoma, unspecified Hodgkin lymphoma type, unspecified body region  - CBC & Differential  - Urinalysis With Culture If Indicated - Urine, Clean Catch  - Urinalysis With Culture If Indicated - Urine, Clean Catch          ASSESSMENT:    Supraclavicular mass  - CBC & Differential        Classical Hodgkin's lymphoma nodular sclerosis status post biopsy of right supraclavicular mass.  No B symptoms.  Clinical stage III disease as patient has lymphadenopathy above and below the diaphragm.  Biopsy does not show any evidence of lymphoma  Hypocellular bone marrow: Unknown etiology  Absent iron stores in the bone marrow.  He had a baseline normal hemoglobin prior to chemo  Chemotherapy induced fatigue  Chemotherapy-induced nausea  Fertility preservation discussion.  Patient has opted to proceed with this and has all the information he needs  I recommended combination chemotherapy with modification of his regimen to include nivolumab plus AVD based on recent clinical trial showing improved progression free survivorship at approximately 92% versus 83% compared to BV AVD.  Patient is a Non-smoker       Discussion    Reviewed diagnosis of Hodgkin's lymphoma  Discussed that this is a curable disease  Discussed the need for complete staging  Discussed that there is  significant role of chemotherapy  Reviewed  Nivolumab plus AVD regimen in detail    I reviewed the benefits the side effects  Chemotherapy side effects include, but not limited to, nausea, vomiting, bone marrow suppression, which can result in blood, platelet transfusion. There is also risk of permanent bone marrow destruction, which can cause myelodysplastic syndrome or leukemia years down the line. There is risk of infection which can result in hospitalization and even death. There is also risk of fatigue, asthenia, alopecia which could become permanent. Chemo will help to reduce risk of relapse of cancer, but does not eliminate risk completely.     Discussed that we will avoid bleomycin with this regimen.  He Does not have any contraindication to nivolumab  Adriamycin can cause cardiotoxicity     Side effects of nivolumab to include but not limited to    Diarrhea, fatigue, pruritus and rash.  Also included pulmonary toxicity, hepatotoxicity, nephrotoxicity as well as neurotoxicity. There is potential for endocrine and metabolic abnormalities including hyperglycemia, hypertriglyceridemia, hyponatremia, phosphorus abnormalities, hypothyroidism or hyperthyroidism.  There could also be pancytopenia, risk of infection and peripheral neuropathy.  Discussed lab monitoring that will be needed while on continuous immunotherapy and medicines to help with supportive care.          Plans    Begin iron supplements 325 mg p.o. daily  Urinalysis to evaluate for hematuria  Handicapped parking placard   Continue supportive care    Follow-up in 3 weeks      Continue chemotherapy with nivolumab AVD      Give patient information on nivolumab    Continue  allopurinol 300 mg p.o. nightly                     History & physical (H&P) including: B symptoms (unexplained fever   >38°C; drenching night sweats; or weight loss >10% of body weight   within 6 mo of diagnosis), alcohol intolerance, pruritus, fatigue,   performance status, and  examination of lymphoid regions, spleen,   and liver   Complete blood count (CBC), differential   Erythrocyte sedimentation rate (ESR)   Comprehensive metabolic panel, lactate dehydrogenase (LDH), and   liver function test (LFT)   Human immunodeficiency virus (HIV) testing (See NCCN Guidelines   for Cancer in People with HIV)   Pregnancy test for those of childbearing potential prior to cytotoxic   chemotherapy or radiation therapy (RT)   FDG-PET/CT scan (skull base to mid-thigh or vertex to feet in   selected cases)c,d   Counseling: Fertility/psychosociale and smoking cessation (See   NCCN Guidelines for Smoking Cessation)   Useful in selected cases:   Fertility preservatione,f   Pulmonary function tests ([PFTs] including diffusing capacity of the   lung for carbon monoxide [DLCO])g if ABVDh,i is being used   Hepatitis B/C testing (encouraged)   Diagnostic CTj (contrast-enhanced)    Chest x-ray (encouraged, especially if large mediastinal mass)   Adequate bone marrow biopsy if there are unexplained cytopenias   other than anemia and negative FDG-PETk   Echocardiogram or multigated acquisition (MUGA) scan and   consideration of atorvastatin1 if anthracycline-based chemotherapy   is indicated   MRI of select sites, with contrast unless contraindicated         I spent a total of 40 minutes:  in review of prior testing and imaging  in review of relevant prior medical records  speaking with patient and/or surrogate  examining patient  counseling patient or caregiver  ordering relevant medications, tests, and/or procedures  on documentation into the electronic medical record  on coordination of care  communicating with other healthcare professionals        Electronically signed by Alethea Mehta MD, 04/21/25, 3:57 PM EDT.

## 2025-04-18 NOTE — TELEPHONE ENCOUNTER
Spoke with patient and informed him his last blood work blood glucose was over 200 and Dr Mehta recommends him follow up with his PCP regarding this. Patient voiced understanding

## 2025-04-21 ENCOUNTER — HOSPITAL ENCOUNTER (OUTPATIENT)
Dept: ONCOLOGY | Facility: HOSPITAL | Age: 25
Discharge: HOME | End: 2025-04-21
Admitting: INTERNAL MEDICINE
Payer: COMMERCIAL

## 2025-04-21 ENCOUNTER — OFFICE VISIT (OUTPATIENT)
Dept: ONCOLOGY | Facility: CLINIC | Age: 25
End: 2025-04-21
Payer: COMMERCIAL

## 2025-04-21 VITALS
TEMPERATURE: 97.6 F | RESPIRATION RATE: 18 BRPM | HEART RATE: 119 BPM | DIASTOLIC BLOOD PRESSURE: 85 MMHG | OXYGEN SATURATION: 95 % | SYSTOLIC BLOOD PRESSURE: 128 MMHG | WEIGHT: 267 LBS | HEIGHT: 70 IN | BODY MASS INDEX: 38.22 KG/M2

## 2025-04-21 DIAGNOSIS — C81.90 HODGKIN LYMPHOMA, UNSPECIFIED HODGKIN LYMPHOMA TYPE, UNSPECIFIED BODY REGION: Primary | ICD-10-CM

## 2025-04-21 LAB
BASOPHILS # BLD AUTO: 0.01 10*3/MM3 (ref 0–0.2)
BASOPHILS NFR BLD AUTO: 0.3 % (ref 0–1.5)
BILIRUB UR QL STRIP: NEGATIVE
CLARITY UR: CLEAR
COLOR UR: YELLOW
DEPRECATED RDW RBC AUTO: 38.5 FL (ref 37–54)
EOSINOPHIL # BLD AUTO: 0.06 10*3/MM3 (ref 0–0.4)
EOSINOPHIL NFR BLD AUTO: 1.8 % (ref 0.3–6.2)
ERYTHROCYTE [DISTWIDTH] IN BLOOD BY AUTOMATED COUNT: 12.2 % (ref 12.3–15.4)
GLUCOSE UR STRIP-MCNC: NEGATIVE MG/DL
HCT VFR BLD AUTO: 39.6 % (ref 37.5–51)
HGB BLD-MCNC: 13.4 G/DL (ref 13–17.7)
HGB UR QL STRIP.AUTO: NEGATIVE
KETONES UR QL STRIP: NEGATIVE
LEUKOCYTE ESTERASE UR QL STRIP.AUTO: NEGATIVE
LYMPHOCYTES # BLD AUTO: 1.18 10*3/MM3 (ref 0.7–3.1)
LYMPHOCYTES NFR BLD AUTO: 35.1 % (ref 19.6–45.3)
MCH RBC QN AUTO: 29.4 PG (ref 26.6–33)
MCHC RBC AUTO-ENTMCNC: 33.8 G/DL (ref 31.5–35.7)
MCV RBC AUTO: 86.8 FL (ref 79–97)
MONOCYTES # BLD AUTO: 0.05 10*3/MM3 (ref 0.1–0.9)
MONOCYTES NFR BLD AUTO: 1.5 % (ref 5–12)
NEUTROPHILS NFR BLD AUTO: 2.06 10*3/MM3 (ref 1.7–7)
NEUTROPHILS NFR BLD AUTO: 61.3 % (ref 42.7–76)
NITRITE UR QL STRIP: NEGATIVE
PH UR STRIP.AUTO: 6 [PH] (ref 5–8)
PLATELET # BLD AUTO: 209 10*3/MM3 (ref 140–450)
PMV BLD AUTO: 10.2 FL (ref 6–12)
PROT UR QL STRIP: NEGATIVE
RBC # BLD AUTO: 4.56 10*6/MM3 (ref 4.14–5.8)
SP GR UR STRIP: >=1.03 (ref 1–1.03)
UROBILINOGEN UR QL STRIP: NORMAL
WBC NRBC COR # BLD AUTO: 3.36 10*3/MM3 (ref 3.4–10.8)

## 2025-04-21 PROCEDURE — 36591 DRAW BLOOD OFF VENOUS DEVICE: CPT

## 2025-04-21 PROCEDURE — 25010000002 HEPARIN LOCK FLUSH PER 10 UNITS: Performed by: INTERNAL MEDICINE

## 2025-04-21 PROCEDURE — 81003 URINALYSIS AUTO W/O SCOPE: CPT | Performed by: INTERNAL MEDICINE

## 2025-04-21 PROCEDURE — 99215 OFFICE O/P EST HI 40 MIN: CPT | Performed by: INTERNAL MEDICINE

## 2025-04-21 PROCEDURE — 85025 COMPLETE CBC W/AUTO DIFF WBC: CPT | Performed by: INTERNAL MEDICINE

## 2025-04-21 RX ORDER — HEPARIN SODIUM (PORCINE) LOCK FLUSH IV SOLN 100 UNIT/ML 100 UNIT/ML
500 SOLUTION INTRAVENOUS AS NEEDED
Status: DISCONTINUED | OUTPATIENT
Start: 2025-04-21 | End: 2025-04-22 | Stop reason: HOSPADM

## 2025-04-21 RX ORDER — FERROUS SULFATE 325(65) MG
325 TABLET ORAL
Qty: 30 TABLET | Refills: 3 | Status: SHIPPED | OUTPATIENT
Start: 2025-04-21

## 2025-04-21 RX ORDER — SODIUM CHLORIDE 0.9 % (FLUSH) 0.9 %
10 SYRINGE (ML) INJECTION AS NEEDED
Status: DISCONTINUED | OUTPATIENT
Start: 2025-04-21 | End: 2025-04-22 | Stop reason: HOSPADM

## 2025-04-21 RX ORDER — SODIUM CHLORIDE 0.9 % (FLUSH) 0.9 %
10 SYRINGE (ML) INJECTION AS NEEDED
OUTPATIENT
Start: 2025-04-21

## 2025-04-21 RX ORDER — HEPARIN SODIUM (PORCINE) LOCK FLUSH IV SOLN 100 UNIT/ML 100 UNIT/ML
500 SOLUTION INTRAVENOUS AS NEEDED
OUTPATIENT
Start: 2025-04-21

## 2025-04-21 RX ADMIN — HEPARIN 500 UNITS: 100 SYRINGE at 12:53

## 2025-04-21 RX ADMIN — Medication 10 ML: at 12:53

## 2025-04-21 NOTE — PROGRESS NOTES
Port accessed and flushed with good blood return noted. 10cc of blood wasted prior to specimen collection. Blood specimen obtained and sent to lab for processing per protocol.  Port flushed with saline and heparin prior to needle removal. Pt lobby for md horowitz.

## 2025-04-23 LAB — CCV RESULT: NORMAL

## 2025-04-29 ENCOUNTER — HOSPITAL ENCOUNTER (OUTPATIENT)
Dept: ONCOLOGY | Facility: HOSPITAL | Age: 25
Discharge: HOME | End: 2025-04-29
Admitting: INTERNAL MEDICINE
Payer: COMMERCIAL

## 2025-04-29 VITALS
RESPIRATION RATE: 14 BRPM | DIASTOLIC BLOOD PRESSURE: 79 MMHG | HEART RATE: 106 BPM | SYSTOLIC BLOOD PRESSURE: 122 MMHG | OXYGEN SATURATION: 95 % | BODY MASS INDEX: 38.45 KG/M2 | WEIGHT: 268.6 LBS | TEMPERATURE: 97.8 F | HEIGHT: 70 IN

## 2025-04-29 DIAGNOSIS — T45.1X5A CHEMOTHERAPY-INDUCED NEUTROPENIA: Primary | ICD-10-CM

## 2025-04-29 DIAGNOSIS — D70.1 CHEMOTHERAPY-INDUCED NEUTROPENIA: Primary | ICD-10-CM

## 2025-04-29 DIAGNOSIS — C81.90 HODGKIN LYMPHOMA, UNSPECIFIED HODGKIN LYMPHOMA TYPE, UNSPECIFIED BODY REGION: Primary | ICD-10-CM

## 2025-04-29 LAB
ALP BLD-CCNC: 78 U/L (ref 53–128)
BASOPHILS # BLD AUTO: 0.03 10*3/MM3 (ref 0–0.2)
BASOPHILS NFR BLD AUTO: 1.5 % (ref 0–1.5)
BUN BLDA-MCNC: 9 MG/DL (ref 7–22)
CALCIUM BLD QL: 9.1 MG/DL (ref 8–10.3)
CHLORIDE BLDA-SCNC: 109 MMOL/L (ref 98–108)
CO2 BLDA-SCNC: 27 MMOL/L (ref 18–33)
CREAT BLDA-MCNC: 1.3 MG/DL (ref 0.6–1.2)
DEPRECATED RDW RBC AUTO: 40.2 FL (ref 37–54)
EGFRCR SERPLBLD CKD-EPI 2021: 77.7 ML/MIN/1.73
EOSINOPHIL # BLD AUTO: 0.09 10*3/MM3 (ref 0–0.4)
EOSINOPHIL NFR BLD AUTO: 4.4 % (ref 0.3–6.2)
ERYTHROCYTE [DISTWIDTH] IN BLOOD BY AUTOMATED COUNT: 13.1 % (ref 12.3–15.4)
GLUCOSE BLDC GLUCOMTR-MCNC: 137 MG/DL (ref 73–118)
HCT VFR BLD AUTO: 40.2 % (ref 37.5–51)
HGB BLD-MCNC: 13.5 G/DL (ref 13–17.7)
LYMPHOCYTES # BLD AUTO: 1.14 10*3/MM3 (ref 0.7–3.1)
LYMPHOCYTES NFR BLD AUTO: 56.2 % (ref 19.6–45.3)
MCH RBC QN AUTO: 29.3 PG (ref 26.6–33)
MCHC RBC AUTO-ENTMCNC: 33.6 G/DL (ref 31.5–35.7)
MCV RBC AUTO: 87.4 FL (ref 79–97)
MONOCYTES # BLD AUTO: 0.46 10*3/MM3 (ref 0.1–0.9)
MONOCYTES NFR BLD AUTO: 22.7 % (ref 5–12)
NEUTROPHILS NFR BLD AUTO: 0.31 10*3/MM3 (ref 1.7–7)
NEUTROPHILS NFR BLD AUTO: 15.2 % (ref 42.7–76)
PLATELET # BLD AUTO: 200 10*3/MM3 (ref 140–450)
PMV BLD AUTO: 9.4 FL (ref 6–12)
POC ALBUMIN: 3.6 G/L (ref 3.3–5.5)
POC ALT (SGPT): 86 U/L (ref 10–47)
POC AST (SGOT): 43 U/L (ref 11–38)
POC TOTAL BILIRUBIN: 0.6 MG/DL (ref 0.2–1.6)
POC TOTAL PROTEIN: 7.1 G/DL (ref 6.4–8.1)
POTASSIUM BLDA-SCNC: 4 MMOL/L (ref 3.6–5.1)
RBC # BLD AUTO: 4.6 10*6/MM3 (ref 4.14–5.8)
SODIUM BLD-SCNC: 142 MMOL/L (ref 128–145)
WBC NRBC COR # BLD AUTO: 2.03 10*3/MM3 (ref 3.4–10.8)

## 2025-04-29 PROCEDURE — 96360 HYDRATION IV INFUSION INIT: CPT

## 2025-04-29 PROCEDURE — 85025 COMPLETE CBC W/AUTO DIFF WBC: CPT | Performed by: INTERNAL MEDICINE

## 2025-04-29 PROCEDURE — 96361 HYDRATE IV INFUSION ADD-ON: CPT

## 2025-04-29 PROCEDURE — 80053 COMPREHEN METABOLIC PANEL: CPT

## 2025-04-29 PROCEDURE — 25810000003 SODIUM CHLORIDE 0.9 % SOLUTION: Performed by: PHYSICIAN ASSISTANT

## 2025-04-29 PROCEDURE — 25010000002 HEPARIN LOCK FLUSH PER 10 UNITS: Performed by: INTERNAL MEDICINE

## 2025-04-29 RX ORDER — HEPARIN SODIUM (PORCINE) LOCK FLUSH IV SOLN 100 UNIT/ML 100 UNIT/ML
500 SOLUTION INTRAVENOUS AS NEEDED
OUTPATIENT
Start: 2025-04-29

## 2025-04-29 RX ORDER — SODIUM CHLORIDE 0.9 % (FLUSH) 0.9 %
10 SYRINGE (ML) INJECTION AS NEEDED
OUTPATIENT
Start: 2025-04-29

## 2025-04-29 RX ORDER — SODIUM CHLORIDE 0.9 % (FLUSH) 0.9 %
10 SYRINGE (ML) INJECTION AS NEEDED
Status: DISCONTINUED | OUTPATIENT
Start: 2025-04-29 | End: 2025-04-30 | Stop reason: HOSPADM

## 2025-04-29 RX ORDER — LEVOFLOXACIN 500 MG/1
500 TABLET, FILM COATED ORAL DAILY
Qty: 7 TABLET | Refills: 0 | Status: SHIPPED | OUTPATIENT
Start: 2025-04-29

## 2025-04-29 RX ORDER — HEPARIN SODIUM (PORCINE) LOCK FLUSH IV SOLN 100 UNIT/ML 100 UNIT/ML
500 SOLUTION INTRAVENOUS AS NEEDED
Status: DISCONTINUED | OUTPATIENT
Start: 2025-04-29 | End: 2025-04-30 | Stop reason: HOSPADM

## 2025-04-29 RX ADMIN — Medication 10 ML: at 12:17

## 2025-04-29 RX ADMIN — HEPARIN SODIUM (PORCINE) LOCK FLUSH IV SOLN 100 UNIT/ML 500 UNITS: 100 SOLUTION at 12:17

## 2025-04-29 RX ADMIN — SODIUM CHLORIDE 1000 ML: 900 INJECTION, SOLUTION INTRAVENOUS at 10:20

## 2025-04-29 NOTE — PROGRESS NOTES
Patient in clinic for C1 D15 AVD + Opdivo.  Port accessed with sterile technique and positive blood return noted.  Blood drawn from port.  10 cc blood wasted prior to specimen collection. Specimens sent to lab for processing. Patient has moderate fatigue and sleeps all the time. He has intermittent nausea but resolves with Zofran. Able to eat and drink well. Treatment held for 1 week due to ANC 0.31; IVF bolus ordered due to elevated creatinine. Patient given handouts and educated on neutropenia precautions. Prophylactic antibiotics sent to patient's pharmacy and patient verbalized understanding. Patient tolerated IVF. Patient discharged with AVS.

## 2025-05-01 ENCOUNTER — TELEPHONE (OUTPATIENT)
Dept: ONCOLOGY | Facility: CLINIC | Age: 25
End: 2025-05-01

## 2025-05-01 NOTE — TELEPHONE ENCOUNTER
Spoke w/ pt and let him know that [facility] will not do compound prescriptions.  I informed him that I will call script in to [facility] on [address].  Pt v/u and requests that I give them his phone number to call when script is ready.  Script called in.

## 2025-05-01 NOTE — TELEPHONE ENCOUNTER
Caller: Johnny Miller    Relationship: Self    Best call back number: [phone]    What is the best time to reach you: ANYTIME    Who are you requesting to speak with (clinical staff, provider,  specific staff member): CLINICAL    What was the call regarding: PT REQUESTING A NEW PRESCRIPTION CALLED IN FOR MAGIC MOUTHWASH     [id number] [place], [place] - [address] [facility] [phone] [facility] [phone] FX

## 2025-05-06 ENCOUNTER — HOSPITAL ENCOUNTER (OUTPATIENT)
Dept: ONCOLOGY | Facility: HOSPITAL | Age: 25
Discharge: HOME | End: 2025-05-06
Admitting: INTERNAL MEDICINE
Payer: COMMERCIAL

## 2025-05-06 VITALS
SYSTOLIC BLOOD PRESSURE: 125 MMHG | DIASTOLIC BLOOD PRESSURE: 78 MMHG | RESPIRATION RATE: 16 BRPM | HEIGHT: 70 IN | OXYGEN SATURATION: 97 % | BODY MASS INDEX: 38.28 KG/M2 | HEART RATE: 82 BPM | WEIGHT: 267.4 LBS | TEMPERATURE: 97.7 F

## 2025-05-06 DIAGNOSIS — C81.90 HODGKIN LYMPHOMA, UNSPECIFIED HODGKIN LYMPHOMA TYPE, UNSPECIFIED BODY REGION: Primary | ICD-10-CM

## 2025-05-06 LAB
ALP BLD-CCNC: 71 U/L (ref 53–128)
BASOPHILS # BLD AUTO: 0.06 10*3/MM3 (ref 0–0.2)
BASOPHILS NFR BLD AUTO: 1.1 % (ref 0–1.5)
BUN BLDA-MCNC: 16 MG/DL (ref 7–22)
CALCIUM BLD QL: 9.1 MG/DL (ref 8–10.3)
CHLORIDE BLDA-SCNC: 109 MMOL/L (ref 98–108)
CO2 BLDA-SCNC: 27 MMOL/L (ref 18–33)
CREAT BLDA-MCNC: 1.2 MG/DL (ref 0.6–1.2)
DEPRECATED RDW RBC AUTO: 41.5 FL (ref 37–54)
EGFRCR SERPLBLD CKD-EPI 2021: 85.5 ML/MIN/1.73
EOSINOPHIL # BLD AUTO: 0.08 10*3/MM3 (ref 0–0.4)
EOSINOPHIL NFR BLD AUTO: 1.5 % (ref 0.3–6.2)
ERYTHROCYTE [DISTWIDTH] IN BLOOD BY AUTOMATED COUNT: 13.6 % (ref 12.3–15.4)
GLUCOSE BLDC GLUCOMTR-MCNC: 97 MG/DL (ref 73–118)
HCT VFR BLD AUTO: 40.3 % (ref 37.5–51)
HGB BLD-MCNC: 13.5 G/DL (ref 13–17.7)
LYMPHOCYTES # BLD AUTO: 2.96 10*3/MM3 (ref 0.7–3.1)
LYMPHOCYTES NFR BLD AUTO: 54.5 % (ref 19.6–45.3)
MCH RBC QN AUTO: 29.2 PG (ref 26.6–33)
MCHC RBC AUTO-ENTMCNC: 33.5 G/DL (ref 31.5–35.7)
MCV RBC AUTO: 87 FL (ref 79–97)
MONOCYTES # BLD AUTO: 0.78 10*3/MM3 (ref 0.1–0.9)
MONOCYTES NFR BLD AUTO: 14.4 % (ref 5–12)
NEUTROPHILS NFR BLD AUTO: 1.55 10*3/MM3 (ref 1.7–7)
NEUTROPHILS NFR BLD AUTO: 28.5 % (ref 42.7–76)
PLATELET # BLD AUTO: 275 10*3/MM3 (ref 140–450)
PMV BLD AUTO: 9 FL (ref 6–12)
POC ALBUMIN: 3.4 G/L (ref 3.3–5.5)
POC ALT (SGPT): 71 U/L (ref 10–47)
POC AST (SGOT): 40 U/L (ref 11–38)
POC TOTAL BILIRUBIN: 0.6 MG/DL (ref 0.2–1.6)
POC TOTAL PROTEIN: 7.1 G/DL (ref 6.4–8.1)
POTASSIUM BLDA-SCNC: 4.1 MMOL/L (ref 3.6–5.1)
RBC # BLD AUTO: 4.63 10*6/MM3 (ref 4.14–5.8)
SODIUM BLD-SCNC: 140 MMOL/L (ref 128–145)
WBC NRBC COR # BLD AUTO: 5.43 10*3/MM3 (ref 3.4–10.8)

## 2025-05-06 PROCEDURE — 96417 CHEMO IV INFUS EACH ADDL SEQ: CPT

## 2025-05-06 PROCEDURE — 25010000002 DACARBAZINE PER 200 MG: Performed by: PHYSICIAN ASSISTANT

## 2025-05-06 PROCEDURE — 80053 COMPREHEN METABOLIC PANEL: CPT

## 2025-05-06 PROCEDURE — 25010000002 HEPARIN LOCK FLUSH PER 10 UNITS: Performed by: INTERNAL MEDICINE

## 2025-05-06 PROCEDURE — 96367 TX/PROPH/DG ADDL SEQ IV INF: CPT

## 2025-05-06 PROCEDURE — 96413 CHEMO IV INFUSION 1 HR: CPT

## 2025-05-06 PROCEDURE — 25810000003 SODIUM CHLORIDE 0.9 % SOLUTION 250 ML FLEX CONT: Performed by: PHYSICIAN ASSISTANT

## 2025-05-06 PROCEDURE — 25010000002 VINBLASTINE PER 1 MG: Performed by: PHYSICIAN ASSISTANT

## 2025-05-06 PROCEDURE — 25010000002 DOXORUBICIN PER 10 MG: Performed by: PHYSICIAN ASSISTANT

## 2025-05-06 PROCEDURE — 25010000002 FOSAPREPITANT PER 1 MG: Performed by: PHYSICIAN ASSISTANT

## 2025-05-06 PROCEDURE — 96376 TX/PRO/DX INJ SAME DRUG ADON: CPT

## 2025-05-06 PROCEDURE — 85025 COMPLETE CBC W/AUTO DIFF WBC: CPT | Performed by: INTERNAL MEDICINE

## 2025-05-06 PROCEDURE — 96375 TX/PRO/DX INJ NEW DRUG ADDON: CPT

## 2025-05-06 PROCEDURE — 25810000003 SODIUM CHLORIDE 0.9 % SOLUTION: Performed by: PHYSICIAN ASSISTANT

## 2025-05-06 PROCEDURE — 25010000002 DEXAMETHASONE PER 1 MG: Performed by: PHYSICIAN ASSISTANT

## 2025-05-06 PROCEDURE — 25010000002 NIVOLUMAB 240 MG/24ML SOLUTION 24 ML VIAL: Performed by: PHYSICIAN ASSISTANT

## 2025-05-06 PROCEDURE — 96411 CHEMO IV PUSH ADDL DRUG: CPT

## 2025-05-06 PROCEDURE — 25010000002 PALONOSETRON PER 25 MCG: Performed by: PHYSICIAN ASSISTANT

## 2025-05-06 PROCEDURE — 25010000002 DACARBAZINE PER 100 MG: Performed by: PHYSICIAN ASSISTANT

## 2025-05-06 RX ORDER — DOXORUBICIN HYDROCHLORIDE 2 MG/ML
25 INJECTION, SOLUTION INTRAVENOUS ONCE
Status: COMPLETED | OUTPATIENT
Start: 2025-05-06 | End: 2025-05-06

## 2025-05-06 RX ORDER — SODIUM CHLORIDE 0.9 % (FLUSH) 0.9 %
10 SYRINGE (ML) INJECTION AS NEEDED
OUTPATIENT
Start: 2025-05-06

## 2025-05-06 RX ORDER — PALONOSETRON 0.05 MG/ML
0.25 INJECTION, SOLUTION INTRAVENOUS ONCE
Status: CANCELLED | OUTPATIENT
Start: 2025-05-06

## 2025-05-06 RX ORDER — SODIUM CHLORIDE 9 MG/ML
20 INJECTION, SOLUTION INTRAVENOUS ONCE
Status: COMPLETED | OUTPATIENT
Start: 2025-05-06 | End: 2025-05-06

## 2025-05-06 RX ORDER — HEPARIN SODIUM (PORCINE) LOCK FLUSH IV SOLN 100 UNIT/ML 100 UNIT/ML
500 SOLUTION INTRAVENOUS AS NEEDED
OUTPATIENT
Start: 2025-05-06

## 2025-05-06 RX ORDER — DOXORUBICIN HYDROCHLORIDE 2 MG/ML
25 INJECTION, SOLUTION INTRAVENOUS ONCE
Status: CANCELLED | OUTPATIENT
Start: 2025-05-06

## 2025-05-06 RX ORDER — PALONOSETRON 0.05 MG/ML
0.25 INJECTION, SOLUTION INTRAVENOUS ONCE
Status: COMPLETED | OUTPATIENT
Start: 2025-05-06 | End: 2025-05-06

## 2025-05-06 RX ORDER — SODIUM CHLORIDE 9 MG/ML
20 INJECTION, SOLUTION INTRAVENOUS ONCE
Status: CANCELLED | OUTPATIENT
Start: 2025-05-06

## 2025-05-06 RX ORDER — HEPARIN SODIUM (PORCINE) LOCK FLUSH IV SOLN 100 UNIT/ML 100 UNIT/ML
500 SOLUTION INTRAVENOUS AS NEEDED
Status: DISCONTINUED | OUTPATIENT
Start: 2025-05-06 | End: 2025-05-07 | Stop reason: HOSPADM

## 2025-05-06 RX ORDER — DEXAMETHASONE SODIUM PHOSPHATE 4 MG/ML
12 INJECTION, SOLUTION INTRA-ARTICULAR; INTRALESIONAL; INTRAMUSCULAR; INTRAVENOUS; SOFT TISSUE ONCE
Status: COMPLETED | OUTPATIENT
Start: 2025-05-06 | End: 2025-05-06

## 2025-05-06 RX ORDER — SODIUM CHLORIDE 0.9 % (FLUSH) 0.9 %
10 SYRINGE (ML) INJECTION AS NEEDED
Status: DISCONTINUED | OUTPATIENT
Start: 2025-05-06 | End: 2025-05-07 | Stop reason: HOSPADM

## 2025-05-06 RX ADMIN — FOSAPREPITANT 100 ML: 150 INJECTION, POWDER, LYOPHILIZED, FOR SOLUTION INTRAVENOUS at 08:54

## 2025-05-06 RX ADMIN — DEXAMETHASONE SODIUM PHOSPHATE 12 MG: 4 INJECTION INTRA-ARTICULAR; INTRALESIONAL; INTRAMUSCULAR; INTRAVENOUS; SOFT TISSUE at 09:24

## 2025-05-06 RX ADMIN — HEPARIN 500 UNITS: 100 SYRINGE at 11:09

## 2025-05-06 RX ADMIN — DACARBAZINE 890 MG: 10 INJECTION, POWDER, FOR SOLUTION INTRAVENOUS at 09:58

## 2025-05-06 RX ADMIN — SODIUM CHLORIDE 20 ML/HR: 9 INJECTION, SOLUTION INTRAVENOUS at 08:51

## 2025-05-06 RX ADMIN — VINBLASTINE SULFATE 14 MG: 1 INJECTION INTRAVENOUS at 09:41

## 2025-05-06 RX ADMIN — Medication 10 ML: at 11:09

## 2025-05-06 RX ADMIN — SODIUM CHLORIDE 240 MG: 9 INJECTION, SOLUTION INTRAVENOUS at 10:35

## 2025-05-06 RX ADMIN — PALONOSETRON 0.25 MG: 0.05 INJECTION, SOLUTION INTRAVENOUS at 08:51

## 2025-05-06 RX ADMIN — DOXORUBICIN HYDROCHLORIDE 30 MG: 2 INJECTION, SOLUTION INTRAVENOUS at 09:28

## 2025-05-06 NOTE — PROGRESS NOTES
Pt here for C1 D15 AVD + nivolumab. Using sterile technique, port accessed for blood collection and treatment. 10 ml blood wasted prior to collecting blood for ordered labs. Pt denies having any new complaints. He reports finishing his antibiotic yesterday. Treatment given and pt tolerated it. Copy of AVS given at discharge and he verbalized understanding.

## 2025-05-12 NOTE — PROGRESS NOTES
Hematology/Oncology Outpatient Follow Up    PATIENT NAME:Johnny Miller  :2000  MRN: [id number]  PRIMARY CARE PHYSICIAN: Graciela Cotter APRN  REFERRING PHYSICIAN: No ref. provider found    Chief Complaint   Patient presents with    Follow-up     Hodgkin lymphoma, unspecified Hodgkin lymphoma type, unspecified body region        HISTORY OF PRESENT ILLNESS:     24-year-old male who has been referred secondary to right supraclavicular mass.  Patient felt a lump on the right neck over the past 4 months.  At the time he noticed it was around the time he had COVID-19 infection in 2024.  He denies night sweats weight loss or fatigue symptoms.  Patient does not smoke.  He drinks only 1 beer per week.  As far as he knows there is no family history of cancer.  States that the mass on the right neck is not painful.     For that reason patient had an ultrasound of the neck completed in 2025 which basically revealed a 3.9 cm well-circumscribed mass right supraclavicular area suspicious characteristics.    For that reason he has been referred to us for further evaluation and management        Patient is alone today for this appointment.    2024: patient had CT scan of the neck and chest with basically revealed right supraclavicular mass measuring approximately 5 x 3 cm enlarged pathologic lymphadenopathy within the mediastinum partially visualized upper retroperitoneal area.  The above findings suggest lymphoproliferative disease  3/19/2025: Patient had additional labs including HIV which was negative LDH was normal, uric acid was normal, sed rate was 27 his white count is 7, hemoglobin 14.4 platelets are 268  4/3/2025: Patient had a PET CT scan which basically revealed hypermetabolic lymphadenopathy in the neck, chest abdomen   2025:: Patient had bone marrow aspiration and biopsy which did not show any evidence of lymphoma involvement.  Patient has hypocellularity of the bone marrow storage iron  is not identified flow cytometry was unremarkable and cytogenetics showed a normal male karyotype  4/15/2025: Patient received cycle 1 of combination chemotherapy with nivolumab AVD  5/6/25 Day 15 Cycle 1 chemotherapy with nivolumab     5/14/25 HPI above reviewed and updated     Past Medical History:   Diagnosis Date    Acne     ADHD     Supraclavicular mass        Past Surgical History:   Procedure Laterality Date    CERVICAL LYMPH NODE BIOPSY/EXCISION Right 3/10/2025    Procedure: Right cervical mass/lymph node excisional biopsy;  Surgeon: Cami Bradford MD;  Location: [facility];  Service: General;  Laterality: Right;    NO PAST SURGERIES      VENOUS ACCESS DEVICE (PORT) INSERTION Left 3/25/2025    Procedure: Port-A-Cath/PowerPort placement;  Surgeon: Cami Bradford MD;  Location: [facility];  Service: General;  Laterality: Left;         Current Outpatient Medications:     allopurinol (Zyloprim) 300 MG tablet, Take 1 tablet by mouth Daily., Disp: 30 tablet, Rfl: 6    buPROPion XL (WELLBUTRIN XL) 150 MG 24 hr tablet, Take 1 tablet by mouth Every Morning. (Patient taking differently: Take 2 tablets by mouth Every Morning.), Disp: , Rfl:     cetirizine (zyrTEC) 10 MG tablet, Take 1 tablet by mouth Daily., Disp: , Rfl:     ferrous sulfate 325 (65 FE) MG tablet, Take 1 tablet by mouth Daily With Breakfast., Disp: 30 tablet, Rfl: 3    melatonin 5 MG tablet tablet, Take  by mouth., Disp: , Rfl:     Nystatin (magic mouthwash), Swish and spit 5 mL 4 (Four) Times a Day. 41 cc Nystatin 35 cc Benadryl 24 cc Viscous Xylocaine, Disp: 100 mL, Rfl: 3    OLANZapine (ZyPREXA) 5 MG tablet, Take 1 tablet by mouth Every Night. Take 1 tablet at night on Days 1, 2, 3 and 4 and Days 15, 16, 17 and 18., Disp: 8 tablet, Rfl: 5    ondansetron (ZOFRAN) 8 MG tablet, Take 1 tablet by mouth 3 (Three) Times a Day As Needed for Nausea or Vomiting., Disp: 30 tablet, Rfl: 5    pyridoxine ([employer] B6) 100 MG tablet, Take 1 tablet by mouth Daily.,  Disp: , Rfl:     ASHWAGANDHA PO, Take 600 mg by mouth. (Patient not taking: Reported on 5/14/2025), Disp: , Rfl:     cholecalciferol (Vitamin D) 25 MCG (1000 UT) tablet, , Disp: , Rfl:     levoFLOXacin (LEVAQUIN) 500 MG tablet, Take 1 tablet by mouth Daily. (Patient not taking: Reported on 5/14/2025), Disp: 7 tablet, Rfl: 0    Zinc 50 MG tablet, Take  by mouth. (Patient not taking: Reported on 5/14/2025), Disp: , Rfl:   No current facility-administered medications for this visit.    Facility-Administered Medications Ordered in Other Visits:     heparin injection 500 Units, 500 Units, Intravenous, PRN, Alethea Mehta MD, 500 Units at 05/14/25 1459    sodium chloride 0.9 % flush 10 mL, 10 mL, Intravenous, PRN, Alethea Mehta MD, 10 mL at 05/14/25 1459    No Known Allergies    Family History   Problem Relation Age of Onset    Alcohol abuse Father     Diabetes Father        Cancer-related family history is not on file.    Social History     Tobacco Use    Smoking status: Never     Passive exposure: Never    Smokeless tobacco: Never   Vaping Use    Vaping status: Never Used   Substance Use Topics    Alcohol use: Yes     Alcohol/week: 2.0 standard drinks of alcohol     Types: 2 Cans of beer per week    Drug use: Never       SUBJECTIVE:     He denies B symptoms.  Today to finalize his treatment plans.  He has a left Mediport.    Patient has had first cycle of chemotherapy.  He had some nausea he also complains of fatigue    5/14/25 Johnny os here for follow up. He is accompanied by his significant other. He reports he is doing ok. He reports some nausea without vomiting and attempting to keep po intake up. He also reports some fatigue the day after treatments .    REVIEW OF SYSTEMS:  Review of Systems   Constitutional:  Positive for fatigue. Negative for chills and fever.   HENT:  Negative for congestion, drooling, ear discharge, rhinorrhea, sinus pressure and tinnitus.    Eyes:  Negative for photophobia,  "pain and discharge.   Respiratory:  Negative for apnea, choking and stridor.    Cardiovascular:  Negative for palpitations.   Gastrointestinal:  Positive for nausea. Negative for abdominal distention, abdominal pain, anal bleeding and vomiting.   Endocrine: Negative for polydipsia and polyphagia.   Genitourinary:  Negative for decreased urine volume, flank pain and genital sores.   Musculoskeletal:  Negative for gait problem, neck pain and neck stiffness.   Skin:  Negative for color change, rash and wound.   Neurological:  Negative for tremors, seizures, syncope, facial asymmetry and speech difficulty.   Hematological:  Negative for adenopathy.   Psychiatric/Behavioral:  Negative for agitation, confusion, hallucinations and self-injury. The patient is not hyperactive.        OBJECTIVE:    Vitals:    05/14/25 1502   BP: 107/72   Pulse: 96   Temp: 98.4 °F (36.9 °C)   TempSrc: Temporal   SpO2: 96%   Weight: 119 kg (261 lb 9.6 oz)   Height: 177.8 cm (70\")   PainSc: 0-No pain           Body mass index is 37.54 kg/m².    ECOG  (0) Fully active, able to carry on all predisease performance without restriction    Physical Exam  Vitals and nursing note reviewed.   Constitutional:       General: He is not in acute distress.     Appearance: Normal appearance. He is not diaphoretic.   HENT:      Head: Normocephalic and atraumatic.      Right Ear: External ear normal.      Left Ear: External ear normal.      Nose: Nose normal.      Mouth/Throat:      Mouth: Mucous membranes are moist.   Eyes:      General: No scleral icterus.        Right eye: No discharge.         Left eye: No discharge.      Conjunctiva/sclera: Conjunctivae normal.   Neck:      Thyroid: No thyromegaly.   Cardiovascular:      Rate and Rhythm: Normal rate and regular rhythm.      Heart sounds: Normal heart sounds.      No friction rub. No gallop.   Pulmonary:      Effort: Pulmonary effort is normal. No respiratory distress.      Breath sounds: Normal breath " sounds. No stridor. No wheezing.   Abdominal:      Palpations: Abdomen is soft.      Tenderness: There is no abdominal tenderness.   Musculoskeletal:         General: No tenderness. Normal range of motion.      Cervical back: Normal range of motion and neck supple.      Right lower leg: No edema.      Left lower leg: No edema.   Lymphadenopathy:      Cervical: No cervical adenopathy.   Skin:     General: Skin is warm.      Findings: No erythema or rash.   Neurological:      Mental Status: He is alert and oriented to person, place, and time.      Motor: No abnormal muscle tone.   Psychiatric:         Mood and Affect: Mood normal.         Behavior: Behavior normal.         Thought Content: Thought content normal.         Judgment: Judgment normal.         Right Supraclavicular zayra enlargement    Left Mediport in place    RECENT LABS  WBC   Date Value Ref Range Status   05/14/2025 4.32 3.40 - 10.80 10*3/mm3 Final     RBC   Date Value Ref Range Status   05/14/2025 4.74 4.14 - 5.80 10*6/mm3 Final     Hemoglobin   Date Value Ref Range Status   05/14/2025 13.9 13.0 - 17.7 g/dL Final     Hematocrit   Date Value Ref Range Status   05/14/2025 40.5 37.5 - 51.0 % Final     MCV   Date Value Ref Range Status   05/14/2025 85.4 79.0 - 97.0 fL Final     MCH   Date Value Ref Range Status   05/14/2025 29.3 26.6 - 33.0 pg Final     MCHC   Date Value Ref Range Status   05/14/2025 34.3 31.5 - 35.7 g/dL Final     RDW   Date Value Ref Range Status   05/14/2025 13.0 12.3 - 15.4 % Final     RDW-SD   Date Value Ref Range Status   05/14/2025 39.8 37.0 - 54.0 fl Final     MPV   Date Value Ref Range Status   05/14/2025 10.2 6.0 - 12.0 fL Final     Platelets   Date Value Ref Range Status   05/14/2025 292 140 - 450 10*3/mm3 Final     Neutrophil %   Date Value Ref Range Status   05/14/2025 58.3 42.7 - 76.0 % Final     Lymphocyte %   Date Value Ref Range Status   05/14/2025 37.3 19.6 - 45.3 % Final     Monocyte %   Date Value Ref Range Status    05/14/2025 2.8 (L) 5.0 - 12.0 % Final     Eosinophil %   Date Value Ref Range Status   05/14/2025 0.9 0.3 - 6.2 % Final     Basophil %   Date Value Ref Range Status   05/14/2025 0.7 0.0 - 1.5 % Final     Immature Grans %   Date Value Ref Range Status   04/08/2025 0.2 0.0 - 0.5 % Final     Neutrophils, Absolute   Date Value Ref Range Status   05/14/2025 2.52 1.70 - 7.00 10*3/mm3 Final     Lymphocytes, Absolute   Date Value Ref Range Status   05/14/2025 1.61 0.70 - 3.10 10*3/mm3 Final     Monocytes, Absolute   Date Value Ref Range Status   05/14/2025 0.12 0.10 - 0.90 10*3/mm3 Final     Eosinophils, Absolute   Date Value Ref Range Status   05/14/2025 0.04 0.00 - 0.40 10*3/mm3 Final     Basophils, Absolute   Date Value Ref Range Status   05/14/2025 0.03 0.00 - 0.20 10*3/mm3 Final     Immature Grans, Absolute   Date Value Ref Range Status   04/08/2025 0.02 0.00 - 0.05 10*3/mm3 Final     nRBC   Date Value Ref Range Status   04/08/2025 0.0 0.0 - 0.2 /100 WBC Final       Lab Results   Component Value Date    GLUCOSE 141 (H) 04/04/2025    BUN 12 04/04/2025    CREATININE 1.20 05/06/2025    EGFRIFNONA 82 02/05/2021    BCR 11.9 04/04/2025    K 3.9 04/04/2025    CO2 21.4 (L) 04/04/2025    CALCIUM 9.1 04/04/2025    ALBUMIN 4.2 04/04/2025    AST 31 04/04/2025    ALT 60 (H) 04/04/2025         Assessment & Plan     Hodgkin lymphoma, unspecified Hodgkin lymphoma type, unspecified body region  - CBC & Differential            ASSESSMENT:    Supraclavicular mass  - CBC & Differential        Classical Hodgkin's lymphoma nodular sclerosis status post biopsy of right supraclavicular mass.  No B symptoms.  Clinical stage III disease as patient has lymphadenopathy above and below the diaphragm.  Biopsy does not show any evidence of lymphoma  Hypocellular bone marrow: Unknown etiology  Absent iron stores in the bone marrow.  He had a baseline normal hemoglobin prior to chemo  Chemotherapy induced fatigue  Chemotherapy-induced  nausea  Fertility preservation discussion.  Patient has opted to proceed with this and has all the information he needs  I recommended combination chemotherapy with modification of his regimen to include nivolumab plus AVD based on recent clinical trial showing improved progression free survivorship at approximately 92% versus 83% compared to BV AVD.  Patient is a Non-smoker       Discussion    Reviewed diagnosis of Hodgkin's lymphoma  Discussed that this is a curable disease  Discussed the need for complete staging  Discussed that there is significant role of chemotherapy  Reviewed  Nivolumab plus AVD regimen in detail    I reviewed the benefits the side effects  Chemotherapy side effects include, but not limited to, nausea, vomiting, bone marrow suppression, which can result in blood, platelet transfusion. There is also risk of permanent bone marrow destruction, which can cause myelodysplastic syndrome or leukemia years down the line. There is risk of infection which can result in hospitalization and even death. There is also risk of fatigue, asthenia, alopecia which could become permanent. Chemo will help to reduce risk of relapse of cancer, but does not eliminate risk completely.     Discussed that we will avoid bleomycin with this regimen.  He Does not have any contraindication to nivolumab  Adriamycin can cause cardiotoxicity     Side effects of nivolumab to include but not limited to    Diarrhea, fatigue, pruritus and rash.  Also included pulmonary toxicity, hepatotoxicity, nephrotoxicity as well as neurotoxicity. There is potential for endocrine and metabolic abnormalities including hyperglycemia, hypertriglyceridemia, hyponatremia, phosphorus abnormalities, hypothyroidism or hyperthyroidism.  There could also be pancytopenia, risk of infection and peripheral neuropathy.  Discussed lab monitoring that will be needed while on continuous immunotherapy and medicines to help with supportive care.           Plans    Begin iron supplements 325 mg p.o. daily, continue same   Urinalysis to evaluate for hematuria normal on 4/21/25   Reviewed cbc WBC 4.2 hemoglobin 13.9 platelets 292,000 ANC 2.52   Handicapped parking placard   Continue supportive care  All questions answered     Follow-up in 3 weeks      Continue chemotherapy with nivolumab AVD      Give patient information on nivolumab during past appointment     Continue  allopurinol 300 mg p.o. nightly                     History & physical (H&P) including: B symptoms (unexplained fever   >38°C; drenching night sweats; or weight loss >10% of body weight   within 6 mo of diagnosis), alcohol intolerance, pruritus, fatigue,   performance status, and examination of lymphoid regions, spleen,   and liver   Complete blood count (CBC), differential   Erythrocyte sedimentation rate (ESR)   Comprehensive metabolic panel, lactate dehydrogenase (LDH), and   liver function test (LFT)   Human immunodeficiency virus (HIV) testing (See NCCN Guidelines   for Cancer in People with HIV)   Pregnancy test for those of childbearing potential prior to cytotoxic   chemotherapy or radiation therapy (RT)   FDG-PET/CT scan (skull base to mid-thigh or vertex to feet in   selected cases)c,d   Counseling: Fertility/psychosociale and smoking cessation (See   NCCN Guidelines for Smoking Cessation)   Useful in selected cases:   Fertility preservatione,f   Pulmonary function tests ([PFTs] including diffusing capacity of the   lung for carbon monoxide [DLCO])g if ABVDh,i is being used   Hepatitis B/C testing (encouraged)   Diagnostic CTj (contrast-enhanced)    Chest x-ray (encouraged, especially if large mediastinal mass)   Adequate bone marrow biopsy if there are unexplained cytopenias   other than anemia and negative FDG-PETk   Echocardiogram or multigated acquisition (MUGA) scan and   consideration of atorvastatin1 if anthracycline-based chemotherapy   is indicated   MRI of select sites, with  contrast unless contraindicated       Time spent on encounter including record review, history taking, exam, discussion, counseling and documentation at: 30 minutes

## 2025-05-14 ENCOUNTER — HOSPITAL ENCOUNTER (OUTPATIENT)
Dept: ONCOLOGY | Facility: HOSPITAL | Age: 25
Discharge: HOME | End: 2025-05-14
Admitting: INTERNAL MEDICINE
Payer: COMMERCIAL

## 2025-05-14 ENCOUNTER — OFFICE VISIT (OUTPATIENT)
Dept: ONCOLOGY | Facility: CLINIC | Age: 25
End: 2025-05-14
Payer: COMMERCIAL

## 2025-05-14 VITALS
HEART RATE: 96 BPM | DIASTOLIC BLOOD PRESSURE: 72 MMHG | WEIGHT: 261.6 LBS | HEIGHT: 70 IN | SYSTOLIC BLOOD PRESSURE: 107 MMHG | OXYGEN SATURATION: 96 % | TEMPERATURE: 98.4 F | BODY MASS INDEX: 37.45 KG/M2

## 2025-05-14 DIAGNOSIS — C81.90 HODGKIN LYMPHOMA, UNSPECIFIED HODGKIN LYMPHOMA TYPE, UNSPECIFIED BODY REGION: Primary | ICD-10-CM

## 2025-05-14 LAB
BASOPHILS # BLD AUTO: 0.03 10*3/MM3 (ref 0–0.2)
BASOPHILS NFR BLD AUTO: 0.7 % (ref 0–1.5)
DEPRECATED RDW RBC AUTO: 39.8 FL (ref 37–54)
EOSINOPHIL # BLD AUTO: 0.04 10*3/MM3 (ref 0–0.4)
EOSINOPHIL NFR BLD AUTO: 0.9 % (ref 0.3–6.2)
ERYTHROCYTE [DISTWIDTH] IN BLOOD BY AUTOMATED COUNT: 13 % (ref 12.3–15.4)
HCT VFR BLD AUTO: 40.5 % (ref 37.5–51)
HGB BLD-MCNC: 13.9 G/DL (ref 13–17.7)
LYMPHOCYTES # BLD AUTO: 1.61 10*3/MM3 (ref 0.7–3.1)
LYMPHOCYTES NFR BLD AUTO: 37.3 % (ref 19.6–45.3)
MCH RBC QN AUTO: 29.3 PG (ref 26.6–33)
MCHC RBC AUTO-ENTMCNC: 34.3 G/DL (ref 31.5–35.7)
MCV RBC AUTO: 85.4 FL (ref 79–97)
MONOCYTES # BLD AUTO: 0.12 10*3/MM3 (ref 0.1–0.9)
MONOCYTES NFR BLD AUTO: 2.8 % (ref 5–12)
NEUTROPHILS NFR BLD AUTO: 2.52 10*3/MM3 (ref 1.7–7)
NEUTROPHILS NFR BLD AUTO: 58.3 % (ref 42.7–76)
PLATELET # BLD AUTO: 292 10*3/MM3 (ref 140–450)
PMV BLD AUTO: 10.2 FL (ref 6–12)
RBC # BLD AUTO: 4.74 10*6/MM3 (ref 4.14–5.8)
WBC NRBC COR # BLD AUTO: 4.32 10*3/MM3 (ref 3.4–10.8)

## 2025-05-14 PROCEDURE — G0463 HOSPITAL OUTPT CLINIC VISIT: HCPCS

## 2025-05-14 PROCEDURE — 25010000002 HEPARIN LOCK FLUSH PER 10 UNITS: Performed by: INTERNAL MEDICINE

## 2025-05-14 PROCEDURE — 85025 COMPLETE CBC W/AUTO DIFF WBC: CPT | Performed by: INTERNAL MEDICINE

## 2025-05-14 PROCEDURE — 96523 IRRIG DRUG DELIVERY DEVICE: CPT

## 2025-05-14 PROCEDURE — 36415 COLL VENOUS BLD VENIPUNCTURE: CPT

## 2025-05-14 RX ORDER — SODIUM CHLORIDE 0.9 % (FLUSH) 0.9 %
10 SYRINGE (ML) INJECTION AS NEEDED
Status: DISCONTINUED | OUTPATIENT
Start: 2025-05-14 | End: 2025-05-15 | Stop reason: HOSPADM

## 2025-05-14 RX ORDER — HEPARIN SODIUM (PORCINE) LOCK FLUSH IV SOLN 100 UNIT/ML 100 UNIT/ML
500 SOLUTION INTRAVENOUS AS NEEDED
Status: DISCONTINUED | OUTPATIENT
Start: 2025-05-14 | End: 2025-05-15 | Stop reason: HOSPADM

## 2025-05-14 RX ORDER — HEPARIN SODIUM (PORCINE) LOCK FLUSH IV SOLN 100 UNIT/ML 100 UNIT/ML
500 SOLUTION INTRAVENOUS AS NEEDED
OUTPATIENT
Start: 2025-05-14

## 2025-05-14 RX ORDER — SODIUM CHLORIDE 0.9 % (FLUSH) 0.9 %
10 SYRINGE (ML) INJECTION AS NEEDED
OUTPATIENT
Start: 2025-05-14

## 2025-05-14 RX ADMIN — Medication 10 ML: at 14:59

## 2025-05-14 RX ADMIN — Medication 500 UNITS: at 14:59

## 2025-05-15 ENCOUNTER — TELEPHONE (OUTPATIENT)
Dept: ONCOLOGY | Facility: CLINIC | Age: 25
End: 2025-05-15

## 2025-05-15 RX ORDER — ALLOPURINOL 300 MG/1
300 TABLET ORAL DAILY
Qty: 30 TABLET | Refills: 6 | Status: SHIPPED | OUTPATIENT
Start: 2025-05-15

## 2025-05-15 NOTE — TELEPHONE ENCOUNTER
Caller: Johnny Miller    Relationship: Self    Best call back number: [phone]    Requested Prescriptions:   Requested Prescriptions     Pending Prescriptions Disp Refills    allopurinol (Zyloprim) 300 MG tablet 30 tablet 6     Sig: Take 1 tablet by mouth Daily.      Pharmacy where request should be sent: [id number] [address] [facility] [phone] [facility] [phone]      Last office visit with prescribing clinician: 4/21/2025   Last telemedicine visit with prescribing clinician: Visit date not found   Next office visit with prescribing clinician: Visit date not found       Does the patient have less than a 3 day supply:  [x] Yes  [] No    Would you like a call back once the refill request has been completed: [] Yes [x] No    If the office needs to give you a call back, can they leave a voicemail: [] Yes [x] No

## 2025-05-15 NOTE — TELEPHONE ENCOUNTER
Caller: Johnny Miller    Relationship to patient: Self    Best call back number: [phone]    Chief complaint: SCHEDULING     Type of visit: INFUSION    Requested date: 5-20    PT REQUESTING TO MEET WITH DR MENDOZA DURING HIS INFUSION SCHEDULED FOR 5-20

## 2025-05-19 DIAGNOSIS — C81.90 HODGKIN LYMPHOMA, UNSPECIFIED HODGKIN LYMPHOMA TYPE, UNSPECIFIED BODY REGION: ICD-10-CM

## 2025-05-19 RX ORDER — OLANZAPINE 5 MG/1
5 TABLET, FILM COATED ORAL NIGHTLY
Qty: 8 TABLET | Refills: 5 | Status: SHIPPED | OUTPATIENT
Start: 2025-05-19

## 2025-05-20 ENCOUNTER — HOSPITAL ENCOUNTER (OUTPATIENT)
Dept: ONCOLOGY | Facility: HOSPITAL | Age: 25
Discharge: HOME | End: 2025-05-20
Admitting: INTERNAL MEDICINE
Payer: COMMERCIAL

## 2025-05-20 VITALS
HEART RATE: 99 BPM | TEMPERATURE: 98 F | HEIGHT: 70 IN | RESPIRATION RATE: 14 BRPM | BODY MASS INDEX: 38.08 KG/M2 | SYSTOLIC BLOOD PRESSURE: 131 MMHG | DIASTOLIC BLOOD PRESSURE: 81 MMHG | OXYGEN SATURATION: 95 % | WEIGHT: 266 LBS

## 2025-05-20 DIAGNOSIS — C81.90 HODGKIN LYMPHOMA, UNSPECIFIED HODGKIN LYMPHOMA TYPE, UNSPECIFIED BODY REGION: Primary | ICD-10-CM

## 2025-05-20 PROBLEM — D70.1 CHEMOTHERAPY-INDUCED NEUTROPENIA: Status: ACTIVE | Noted: 2025-05-20

## 2025-05-20 PROBLEM — T45.1X5A CHEMOTHERAPY-INDUCED NEUTROPENIA: Status: ACTIVE | Noted: 2025-05-20

## 2025-05-20 LAB
ALP BLD-CCNC: 68 U/L (ref 53–128)
BASOPHILS # BLD AUTO: 0.03 10*3/MM3 (ref 0–0.2)
BASOPHILS NFR BLD AUTO: 1 % (ref 0–1.5)
BUN BLDA-MCNC: 12 MG/DL (ref 7–22)
CALCIUM BLD QL: 9.6 MG/DL (ref 8–10.3)
CHLORIDE BLDA-SCNC: 108 MMOL/L (ref 98–108)
CO2 BLDA-SCNC: 25 MMOL/L (ref 18–33)
CREAT BLDA-MCNC: 1 MG/DL (ref 0.6–1.2)
DEPRECATED RDW RBC AUTO: 43 FL (ref 37–54)
EGFRCR SERPLBLD CKD-EPI 2021: 106.5 ML/MIN/1.73
EOSINOPHIL # BLD AUTO: 0.1 10*3/MM3 (ref 0–0.4)
EOSINOPHIL NFR BLD AUTO: 3.3 % (ref 0.3–6.2)
ERYTHROCYTE [DISTWIDTH] IN BLOOD BY AUTOMATED COUNT: 14.4 % (ref 12.3–15.4)
GLUCOSE BLDC GLUCOMTR-MCNC: 138 MG/DL (ref 73–118)
HCT VFR BLD AUTO: 37.9 % (ref 37.5–51)
HGB BLD-MCNC: 12.9 G/DL (ref 13–17.7)
LYMPHOCYTES # BLD AUTO: 1.84 10*3/MM3 (ref 0.7–3.1)
LYMPHOCYTES NFR BLD AUTO: 61.5 % (ref 19.6–45.3)
MCH RBC QN AUTO: 29.5 PG (ref 26.6–33)
MCHC RBC AUTO-ENTMCNC: 34 G/DL (ref 31.5–35.7)
MCV RBC AUTO: 86.7 FL (ref 79–97)
MONOCYTES # BLD AUTO: 0.45 10*3/MM3 (ref 0.1–0.9)
MONOCYTES NFR BLD AUTO: 15.1 % (ref 5–12)
NEUTROPHILS NFR BLD AUTO: 0.57 10*3/MM3 (ref 1.7–7)
NEUTROPHILS NFR BLD AUTO: 19.1 % (ref 42.7–76)
PLATELET # BLD AUTO: 229 10*3/MM3 (ref 140–450)
PMV BLD AUTO: 9.8 FL (ref 6–12)
POC ALBUMIN: 3.5 G/L (ref 3.3–5.5)
POC ALT (SGPT): 71 U/L (ref 10–47)
POC AST (SGOT): 37 U/L (ref 11–38)
POC TOTAL BILIRUBIN: 0.6 MG/DL (ref 0.2–1.6)
POC TOTAL PROTEIN: 7.1 G/DL (ref 6.4–8.1)
POTASSIUM BLDA-SCNC: 3.6 MMOL/L (ref 3.6–5.1)
RBC # BLD AUTO: 4.37 10*6/MM3 (ref 4.14–5.8)
SODIUM BLD-SCNC: 142 MMOL/L (ref 128–145)
T4 SERPL-MCNC: 6.51 MCG/DL (ref 4.5–11.7)
TSH SERPL DL<=0.05 MIU/L-ACNC: 2.69 UIU/ML (ref 0.27–4.2)
WBC NRBC COR # BLD AUTO: 2.99 10*3/MM3 (ref 3.4–10.8)

## 2025-05-20 PROCEDURE — 80053 COMPREHEN METABOLIC PANEL: CPT

## 2025-05-20 PROCEDURE — 25010000002 HEPARIN LOCK FLUSH PER 10 UNITS: Performed by: INTERNAL MEDICINE

## 2025-05-20 PROCEDURE — 84436 ASSAY OF TOTAL THYROXINE: CPT | Performed by: INTERNAL MEDICINE

## 2025-05-20 PROCEDURE — 85025 COMPLETE CBC W/AUTO DIFF WBC: CPT | Performed by: INTERNAL MEDICINE

## 2025-05-20 PROCEDURE — 36591 DRAW BLOOD OFF VENOUS DEVICE: CPT

## 2025-05-20 PROCEDURE — 84443 ASSAY THYROID STIM HORMONE: CPT | Performed by: INTERNAL MEDICINE

## 2025-05-20 RX ORDER — SODIUM CHLORIDE 0.9 % (FLUSH) 0.9 %
10 SYRINGE (ML) INJECTION AS NEEDED
Status: CANCELLED | OUTPATIENT
Start: 2025-05-20

## 2025-05-20 RX ORDER — HEPARIN SODIUM (PORCINE) LOCK FLUSH IV SOLN 100 UNIT/ML 100 UNIT/ML
500 SOLUTION INTRAVENOUS AS NEEDED
Status: DISCONTINUED | OUTPATIENT
Start: 2025-05-20 | End: 2025-05-21 | Stop reason: HOSPADM

## 2025-05-20 RX ORDER — SODIUM CHLORIDE 0.9 % (FLUSH) 0.9 %
10 SYRINGE (ML) INJECTION AS NEEDED
Status: DISCONTINUED | OUTPATIENT
Start: 2025-05-20 | End: 2025-05-21 | Stop reason: HOSPADM

## 2025-05-20 RX ORDER — HEPARIN SODIUM (PORCINE) LOCK FLUSH IV SOLN 100 UNIT/ML 100 UNIT/ML
500 SOLUTION INTRAVENOUS AS NEEDED
Status: CANCELLED | OUTPATIENT
Start: 2025-05-20

## 2025-05-20 RX ADMIN — HEPARIN 500 UNITS: 100 SYRINGE at 11:11

## 2025-05-20 RX ADMIN — Medication 10 ML: at 11:11

## 2025-05-20 NOTE — PROGRESS NOTES
Pt here for C2D1 of AVD and Opdivo. Port accessed and flushed with good blood return noted. 10cc of blood wasted prior to specimen collection. Blood specimen obtained and sent to lab for processing per protocol. Pt denies any complaints at this time. Dr. Mehta notified that pt's ANC is 0.57 today. Dr. Mehta gave verbal orders to have pt receive neupogen injections for 3 days followed by chemotherapy. Iva Srivastava RN notified and is working on that. Schedulers will contact pt when a PA is received. Pt instructed to follow neutropenic precautions and that schedulers would be contacting him. Pt and s/o v/u. Port flushed with saline and heparin prior to needle removal. Pt given AVS and d/c accompanied by s/o.

## 2025-05-20 NOTE — PATIENT INSTRUCTIONS
Neupogen is pending insurance authorization.  will contact you when approved and to schedule injections for 3 days, followed by chemotherapy. Follow neutropenic precautions that are attached.

## 2025-05-22 ENCOUNTER — TELEPHONE (OUTPATIENT)
Dept: ONCOLOGY | Facility: CLINIC | Age: 25
End: 2025-05-22
Payer: COMMERCIAL

## 2025-05-22 ENCOUNTER — APPOINTMENT (OUTPATIENT)
Dept: LAB | Facility: HOSPITAL | Age: 25
End: 2025-05-22
Payer: COMMERCIAL

## 2025-05-22 ENCOUNTER — HOSPITAL ENCOUNTER (OUTPATIENT)
Dept: ONCOLOGY | Facility: HOSPITAL | Age: 25
Discharge: HOME | End: 2025-05-22
Admitting: INTERNAL MEDICINE
Payer: COMMERCIAL

## 2025-05-22 VITALS
DIASTOLIC BLOOD PRESSURE: 88 MMHG | RESPIRATION RATE: 16 BRPM | SYSTOLIC BLOOD PRESSURE: 137 MMHG | WEIGHT: 267 LBS | TEMPERATURE: 98.2 F | BODY MASS INDEX: 38.22 KG/M2 | HEART RATE: 82 BPM | HEIGHT: 70 IN | OXYGEN SATURATION: 97 %

## 2025-05-22 DIAGNOSIS — T45.1X5A CHEMOTHERAPY-INDUCED NEUTROPENIA: ICD-10-CM

## 2025-05-22 DIAGNOSIS — C81.90 HODGKIN LYMPHOMA, UNSPECIFIED HODGKIN LYMPHOMA TYPE, UNSPECIFIED BODY REGION: ICD-10-CM

## 2025-05-22 DIAGNOSIS — D70.1 CHEMOTHERAPY-INDUCED NEUTROPENIA: Primary | ICD-10-CM

## 2025-05-22 DIAGNOSIS — D70.1 CHEMOTHERAPY-INDUCED NEUTROPENIA: ICD-10-CM

## 2025-05-22 DIAGNOSIS — C81.90 HODGKIN LYMPHOMA, UNSPECIFIED HODGKIN LYMPHOMA TYPE, UNSPECIFIED BODY REGION: Primary | ICD-10-CM

## 2025-05-22 DIAGNOSIS — T45.1X5A CHEMOTHERAPY-INDUCED NEUTROPENIA: Primary | ICD-10-CM

## 2025-05-22 LAB
BASOPHILS # BLD AUTO: 0.02 10*3/MM3 (ref 0–0.2)
BASOPHILS NFR BLD AUTO: 0.7 % (ref 0–1.5)
DEPRECATED RDW RBC AUTO: 44 FL (ref 37–54)
EOSINOPHIL # BLD AUTO: 0.09 10*3/MM3 (ref 0–0.4)
EOSINOPHIL NFR BLD AUTO: 3.1 % (ref 0.3–6.2)
ERYTHROCYTE [DISTWIDTH] IN BLOOD BY AUTOMATED COUNT: 14.3 % (ref 12.3–15.4)
HCT VFR BLD AUTO: 38.6 % (ref 37.5–51)
HGB BLD-MCNC: 13.1 G/DL (ref 13–17.7)
LYMPHOCYTES # BLD AUTO: 1.99 10*3/MM3 (ref 0.7–3.1)
LYMPHOCYTES NFR BLD AUTO: 67.5 % (ref 19.6–45.3)
MCH RBC QN AUTO: 29.6 PG (ref 26.6–33)
MCHC RBC AUTO-ENTMCNC: 33.9 G/DL (ref 31.5–35.7)
MCV RBC AUTO: 87.1 FL (ref 79–97)
MONOCYTES # BLD AUTO: 0.48 10*3/MM3 (ref 0.1–0.9)
MONOCYTES NFR BLD AUTO: 16.3 % (ref 5–12)
NEUTROPHILS NFR BLD AUTO: 0.37 10*3/MM3 (ref 1.7–7)
NEUTROPHILS NFR BLD AUTO: 12.4 % (ref 42.7–76)
PLATELET # BLD AUTO: 227 10*3/MM3 (ref 140–450)
PMV BLD AUTO: 9.4 FL (ref 6–12)
RBC # BLD AUTO: 4.43 10*6/MM3 (ref 4.14–5.8)
WBC NRBC COR # BLD AUTO: 2.95 10*3/MM3 (ref 3.4–10.8)

## 2025-05-22 PROCEDURE — 25010000002 HEPARIN LOCK FLUSH PER 10 UNITS: Performed by: INTERNAL MEDICINE

## 2025-05-22 PROCEDURE — 82948 REAGENT STRIP/BLOOD GLUCOSE: CPT

## 2025-05-22 PROCEDURE — 85025 COMPLETE CBC W/AUTO DIFF WBC: CPT | Performed by: INTERNAL MEDICINE

## 2025-05-22 PROCEDURE — 25010000002 FILGRASTIM 480 MCG/0.8ML SOLUTION PREFILLED SYRINGE: Performed by: INTERNAL MEDICINE

## 2025-05-22 PROCEDURE — 96372 THER/PROPH/DIAG INJ SC/IM: CPT

## 2025-05-22 RX ORDER — SODIUM CHLORIDE 9 MG/ML
20 INJECTION, SOLUTION INTRAVENOUS ONCE
OUTPATIENT
Start: 2025-05-27

## 2025-05-22 RX ORDER — SODIUM CHLORIDE 0.9 % (FLUSH) 0.9 %
10 SYRINGE (ML) INJECTION AS NEEDED
OUTPATIENT
Start: 2025-05-22

## 2025-05-22 RX ORDER — DOXORUBICIN HYDROCHLORIDE 2 MG/ML
25 INJECTION, SOLUTION INTRAVENOUS ONCE
OUTPATIENT
Start: 2025-05-27

## 2025-05-22 RX ORDER — HEPARIN SODIUM (PORCINE) LOCK FLUSH IV SOLN 100 UNIT/ML 100 UNIT/ML
500 SOLUTION INTRAVENOUS AS NEEDED
OUTPATIENT
Start: 2025-05-22

## 2025-05-22 RX ORDER — PALONOSETRON 0.05 MG/ML
0.25 INJECTION, SOLUTION INTRAVENOUS ONCE
OUTPATIENT
Start: 2025-05-27

## 2025-05-22 RX ORDER — SODIUM CHLORIDE 0.9 % (FLUSH) 0.9 %
10 SYRINGE (ML) INJECTION AS NEEDED
Status: DISCONTINUED | OUTPATIENT
Start: 2025-05-22 | End: 2025-05-23 | Stop reason: HOSPADM

## 2025-05-22 RX ORDER — HEPARIN SODIUM (PORCINE) LOCK FLUSH IV SOLN 100 UNIT/ML 100 UNIT/ML
500 SOLUTION INTRAVENOUS AS NEEDED
Status: DISCONTINUED | OUTPATIENT
Start: 2025-05-22 | End: 2025-05-23 | Stop reason: HOSPADM

## 2025-05-22 RX ADMIN — FILGRASTIM 480 MCG: 480 INJECTION, SOLUTION INTRAVENOUS; SUBCUTANEOUS at 12:08

## 2025-05-22 RX ADMIN — Medication 10 ML: at 12:09

## 2025-05-22 RX ADMIN — HEPARIN 500 UNITS: 100 SYRINGE at 12:09

## 2025-05-22 NOTE — TELEPHONE ENCOUNTER
Spoke w/ pt and let him know that his Neupogen has been approved, but Dr. Mehta would like for him to come in for a CBC prior to deciding if he still needs it.  Pt scheduled for today at 11:45.  Pt instructed to wait for results in case he needs the injection.  Pt v/u.

## 2025-05-22 NOTE — PROGRESS NOTES
Pt here for C2 D1 doxorubicin, dacarbazine, nivolumab, and vinblastine. Using sterile technique, port accessed for blood collection and treatment. 10 ml blood wasted prior to collecting blood for ordered labs. Wbc 2.95, anc 0.37 today. Informed Dr Mehta and order received to hold treatment today. Pt received neupogen injection today and is scheduled to receive it on 5/23 here and 5/24 at [facility]. Pt to come in for treatment next Tues and he verbalized understanding.

## 2025-05-23 ENCOUNTER — HOSPITAL ENCOUNTER (OUTPATIENT)
Dept: ONCOLOGY | Facility: HOSPITAL | Age: 25
Discharge: HOME | End: 2025-05-23
Payer: COMMERCIAL

## 2025-05-23 DIAGNOSIS — D70.1 CHEMOTHERAPY-INDUCED NEUTROPENIA: Primary | ICD-10-CM

## 2025-05-23 DIAGNOSIS — C81.90 HODGKIN LYMPHOMA, UNSPECIFIED HODGKIN LYMPHOMA TYPE, UNSPECIFIED BODY REGION: ICD-10-CM

## 2025-05-23 DIAGNOSIS — T45.1X5A CHEMOTHERAPY-INDUCED NEUTROPENIA: Primary | ICD-10-CM

## 2025-05-23 PROCEDURE — 96372 THER/PROPH/DIAG INJ SC/IM: CPT

## 2025-05-23 PROCEDURE — 25010000002 FILGRASTIM 480 MCG/0.8ML SOLUTION PREFILLED SYRINGE: Performed by: INTERNAL MEDICINE

## 2025-05-23 RX ADMIN — FILGRASTIM 480 MCG: 480 INJECTION, SOLUTION INTRAVENOUS; SUBCUTANEOUS at 13:37

## 2025-05-24 ENCOUNTER — HOSPITAL ENCOUNTER (OUTPATIENT)
Dept: INFUSION THERAPY | Facility: HOSPITAL | Age: 25
Discharge: HOME | End: 2025-05-24
Payer: COMMERCIAL

## 2025-05-24 VITALS
DIASTOLIC BLOOD PRESSURE: 77 MMHG | TEMPERATURE: 97.7 F | OXYGEN SATURATION: 98 % | SYSTOLIC BLOOD PRESSURE: 129 MMHG | RESPIRATION RATE: 18 BRPM | HEART RATE: 90 BPM

## 2025-05-24 DIAGNOSIS — C81.90 HODGKIN LYMPHOMA, UNSPECIFIED HODGKIN LYMPHOMA TYPE, UNSPECIFIED BODY REGION: ICD-10-CM

## 2025-05-24 DIAGNOSIS — T45.1X5A CHEMOTHERAPY-INDUCED NEUTROPENIA: Primary | ICD-10-CM

## 2025-05-24 DIAGNOSIS — D70.1 CHEMOTHERAPY-INDUCED NEUTROPENIA: Primary | ICD-10-CM

## 2025-05-24 PROCEDURE — 25010000002 FILGRASTIM 480 MCG/0.8ML SOLUTION PREFILLED SYRINGE: Performed by: INTERNAL MEDICINE

## 2025-05-24 PROCEDURE — 96372 THER/PROPH/DIAG INJ SC/IM: CPT

## 2025-05-24 RX ADMIN — FILGRASTIM 480 MCG: 480 INJECTION, SOLUTION INTRAVENOUS; SUBCUTANEOUS at 09:13

## 2025-05-27 ENCOUNTER — HOSPITAL ENCOUNTER (OUTPATIENT)
Dept: ONCOLOGY | Facility: HOSPITAL | Age: 25
Discharge: HOME | End: 2025-05-27
Admitting: INTERNAL MEDICINE
Payer: COMMERCIAL

## 2025-05-27 VITALS
SYSTOLIC BLOOD PRESSURE: 126 MMHG | DIASTOLIC BLOOD PRESSURE: 83 MMHG | OXYGEN SATURATION: 97 % | HEART RATE: 103 BPM | RESPIRATION RATE: 16 BRPM | BODY MASS INDEX: 38.08 KG/M2 | HEIGHT: 70 IN | TEMPERATURE: 98 F | WEIGHT: 266 LBS

## 2025-05-27 DIAGNOSIS — C81.90 HODGKIN LYMPHOMA, UNSPECIFIED HODGKIN LYMPHOMA TYPE, UNSPECIFIED BODY REGION: Primary | ICD-10-CM

## 2025-05-27 LAB
ALP BLD-CCNC: 93 U/L (ref 53–128)
BASOPHILS # BLD AUTO: 0.02 10*3/MM3 (ref 0–0.2)
BASOPHILS NFR BLD AUTO: 0.2 % (ref 0–1.5)
BUN BLDA-MCNC: 14 MG/DL (ref 7–22)
CALCIUM BLD QL: 9.5 MG/DL (ref 8–10.3)
CHLORIDE BLDA-SCNC: 107 MMOL/L (ref 98–108)
CO2 BLDA-SCNC: 27 MMOL/L (ref 18–33)
CREAT BLDA-MCNC: 1.2 MG/DL (ref 0.6–1.2)
DEPRECATED RDW RBC AUTO: 48.7 FL (ref 37–54)
EGFRCR SERPLBLD CKD-EPI 2021: 85.5 ML/MIN/1.73
EOSINOPHIL # BLD AUTO: 0.14 10*3/MM3 (ref 0–0.4)
EOSINOPHIL NFR BLD AUTO: 1.7 % (ref 0.3–6.2)
ERYTHROCYTE [DISTWIDTH] IN BLOOD BY AUTOMATED COUNT: 15.4 % (ref 12.3–15.4)
GLUCOSE BLDC GLUCOMTR-MCNC: 128 MG/DL (ref 73–118)
HCT VFR BLD AUTO: 41.3 % (ref 37.5–51)
HGB BLD-MCNC: 13.9 G/DL (ref 13–17.7)
LYMPHOCYTES # BLD AUTO: 3.12 10*3/MM3 (ref 0.7–3.1)
LYMPHOCYTES NFR BLD AUTO: 38.9 % (ref 19.6–45.3)
MCH RBC QN AUTO: 29.7 PG (ref 26.6–33)
MCHC RBC AUTO-ENTMCNC: 33.7 G/DL (ref 31.5–35.7)
MCV RBC AUTO: 88.2 FL (ref 79–97)
MONOCYTES # BLD AUTO: 0.86 10*3/MM3 (ref 0.1–0.9)
MONOCYTES NFR BLD AUTO: 10.7 % (ref 5–12)
NEUTROPHILS NFR BLD AUTO: 3.88 10*3/MM3 (ref 1.7–7)
NEUTROPHILS NFR BLD AUTO: 48.5 % (ref 42.7–76)
PLATELET # BLD AUTO: 193 10*3/MM3 (ref 140–450)
PMV BLD AUTO: 9.8 FL (ref 6–12)
POC ALBUMIN: 3.7 G/L (ref 3.3–5.5)
POC ALT (SGPT): 81 U/L (ref 10–47)
POC AST (SGOT): 40 U/L (ref 11–38)
POC TOTAL BILIRUBIN: 0.6 MG/DL (ref 0.2–1.6)
POC TOTAL PROTEIN: 7.3 G/DL (ref 6.4–8.1)
POTASSIUM BLDA-SCNC: 3.8 MMOL/L (ref 3.6–5.1)
RBC # BLD AUTO: 4.68 10*6/MM3 (ref 4.14–5.8)
SODIUM BLD-SCNC: 143 MMOL/L (ref 128–145)
T4 SERPL-MCNC: 7.43 MCG/DL (ref 4.5–11.7)
TSH SERPL DL<=0.05 MIU/L-ACNC: 3.66 UIU/ML (ref 0.27–4.2)
WBC NRBC COR # BLD AUTO: 8.02 10*3/MM3 (ref 3.4–10.8)

## 2025-05-27 PROCEDURE — 25010000002 DEXAMETHASONE PER 1 MG: Performed by: INTERNAL MEDICINE

## 2025-05-27 PROCEDURE — 25810000003 SODIUM CHLORIDE 0.9 % SOLUTION: Performed by: INTERNAL MEDICINE

## 2025-05-27 PROCEDURE — 25010000002 VINBLASTINE PER 1 MG: Performed by: INTERNAL MEDICINE

## 2025-05-27 PROCEDURE — 96377 APPLICATON ON-BODY INJECTOR: CPT

## 2025-05-27 PROCEDURE — 96411 CHEMO IV PUSH ADDL DRUG: CPT

## 2025-05-27 PROCEDURE — 96413 CHEMO IV INFUSION 1 HR: CPT

## 2025-05-27 PROCEDURE — 25010000002 FOSAPREPITANT PER 1 MG: Performed by: INTERNAL MEDICINE

## 2025-05-27 PROCEDURE — 84443 ASSAY THYROID STIM HORMONE: CPT | Performed by: INTERNAL MEDICINE

## 2025-05-27 PROCEDURE — 25010000002 DACARBAZINE PER 200 MG: Performed by: INTERNAL MEDICINE

## 2025-05-27 PROCEDURE — 25010000002 HEPARIN LOCK FLUSH PER 10 UNITS: Performed by: INTERNAL MEDICINE

## 2025-05-27 PROCEDURE — 96375 TX/PRO/DX INJ NEW DRUG ADDON: CPT

## 2025-05-27 PROCEDURE — 85025 COMPLETE CBC W/AUTO DIFF WBC: CPT | Performed by: INTERNAL MEDICINE

## 2025-05-27 PROCEDURE — 96417 CHEMO IV INFUS EACH ADDL SEQ: CPT

## 2025-05-27 PROCEDURE — 25010000002 PEGFILGRASTIM-CBQV 6 MG/0.6ML SOLUTION PREFILLED SYRINGE: Performed by: INTERNAL MEDICINE

## 2025-05-27 PROCEDURE — 84436 ASSAY OF TOTAL THYROXINE: CPT | Performed by: INTERNAL MEDICINE

## 2025-05-27 PROCEDURE — 25010000002 DOXORUBICIN PER 10 MG: Performed by: INTERNAL MEDICINE

## 2025-05-27 PROCEDURE — 25010000002 NIVOLUMAB 240 MG/24ML SOLUTION 24 ML VIAL: Performed by: INTERNAL MEDICINE

## 2025-05-27 PROCEDURE — 25810000003 SODIUM CHLORIDE 0.9 % SOLUTION 250 ML FLEX CONT: Performed by: INTERNAL MEDICINE

## 2025-05-27 PROCEDURE — 96367 TX/PROPH/DG ADDL SEQ IV INF: CPT

## 2025-05-27 PROCEDURE — 25010000002 PALONOSETRON 0.25 MG/5ML SOLUTION PREFILLED SYRINGE: Performed by: INTERNAL MEDICINE

## 2025-05-27 PROCEDURE — 80053 COMPREHEN METABOLIC PANEL: CPT

## 2025-05-27 PROCEDURE — 25010000002 DACARBAZINE PER 100 MG: Performed by: INTERNAL MEDICINE

## 2025-05-27 RX ORDER — PALONOSETRON 0.05 MG/ML
0.25 INJECTION, SOLUTION INTRAVENOUS ONCE
Status: COMPLETED | OUTPATIENT
Start: 2025-05-27 | End: 2025-05-27

## 2025-05-27 RX ORDER — HEPARIN SODIUM (PORCINE) LOCK FLUSH IV SOLN 100 UNIT/ML 100 UNIT/ML
500 SOLUTION INTRAVENOUS AS NEEDED
OUTPATIENT
Start: 2025-05-27

## 2025-05-27 RX ORDER — SODIUM CHLORIDE 0.9 % (FLUSH) 0.9 %
10 SYRINGE (ML) INJECTION AS NEEDED
Status: DISCONTINUED | OUTPATIENT
Start: 2025-05-27 | End: 2025-05-28 | Stop reason: HOSPADM

## 2025-05-27 RX ORDER — DEXAMETHASONE SODIUM PHOSPHATE 4 MG/ML
12 INJECTION, SOLUTION INTRA-ARTICULAR; INTRALESIONAL; INTRAMUSCULAR; INTRAVENOUS; SOFT TISSUE ONCE
Status: COMPLETED | OUTPATIENT
Start: 2025-05-27 | End: 2025-05-27

## 2025-05-27 RX ORDER — SODIUM CHLORIDE 0.9 % (FLUSH) 0.9 %
10 SYRINGE (ML) INJECTION AS NEEDED
OUTPATIENT
Start: 2025-05-27

## 2025-05-27 RX ORDER — SODIUM CHLORIDE 9 MG/ML
20 INJECTION, SOLUTION INTRAVENOUS ONCE
Status: COMPLETED | OUTPATIENT
Start: 2025-05-27 | End: 2025-05-27

## 2025-05-27 RX ORDER — DOXORUBICIN HYDROCHLORIDE 2 MG/ML
25 INJECTION, SOLUTION INTRAVENOUS ONCE
Status: COMPLETED | OUTPATIENT
Start: 2025-05-27 | End: 2025-05-27

## 2025-05-27 RX ORDER — HEPARIN SODIUM (PORCINE) LOCK FLUSH IV SOLN 100 UNIT/ML 100 UNIT/ML
500 SOLUTION INTRAVENOUS AS NEEDED
Status: DISCONTINUED | OUTPATIENT
Start: 2025-05-27 | End: 2025-05-28 | Stop reason: HOSPADM

## 2025-05-27 RX ADMIN — DACARBAZINE 890 MG: 10 INJECTION, POWDER, FOR SOLUTION INTRAVENOUS at 12:27

## 2025-05-27 RX ADMIN — SODIUM CHLORIDE 20 ML/HR: 9 INJECTION, SOLUTION INTRAVENOUS at 11:01

## 2025-05-27 RX ADMIN — SODIUM CHLORIDE 240 MG: 9 INJECTION, SOLUTION INTRAVENOUS at 13:02

## 2025-05-27 RX ADMIN — DOXORUBICIN HYDROCHLORIDE 30 MG: 2 INJECTION, SOLUTION INTRAVENOUS at 12:01

## 2025-05-27 RX ADMIN — VINBLASTINE SULFATE 14 MG: 1 INJECTION INTRAVENOUS at 12:10

## 2025-05-27 RX ADMIN — Medication 10 ML: at 13:42

## 2025-05-27 RX ADMIN — FOSAPREPITANT 100 ML: 150 INJECTION, POWDER, LYOPHILIZED, FOR SOLUTION INTRAVENOUS at 11:11

## 2025-05-27 RX ADMIN — PEGFILGRASTIM-CBQV 6 MG: 6 INJECTION, SOLUTION SUBCUTANEOUS at 13:45

## 2025-05-27 RX ADMIN — DEXAMETHASONE SODIUM PHOSPHATE 12 MG: 4 INJECTION, SOLUTION INTRAMUSCULAR; INTRAVENOUS at 11:06

## 2025-05-27 RX ADMIN — PALONOSETRON 0.25 MG: 0.25 INJECTION, SOLUTION INTRAVENOUS at 11:04

## 2025-05-27 RX ADMIN — HEPARIN 500 UNITS: 100 SYRINGE at 13:42

## 2025-05-27 NOTE — PROGRESS NOTES
Pt. Is here for C2D1 Opdivo, +AVD, (deferred from last week due to low ANC)  Pt. Seems to be doing well today and he has no new complaints.   Lab results within parameters for treatment today.   Message sent to provider about possible need for neupogen injections again.   No auth on neulasta  Udenyca on body approved per pt's insurance, ok to give per Karina Beam.  Treatment given as ordered and pt. Tolerated well.   Pt. Discharged from clinic with no complaints and AVS was given.   Message sent to  pool that pt. Needs an appt. With Dr. Hunt.

## 2025-05-29 LAB — GLUCOSE BLDC GLUCOMTR-MCNC: 119 MG/DL (ref 70–105)

## 2025-06-09 DIAGNOSIS — C81.90 HODGKIN LYMPHOMA, UNSPECIFIED HODGKIN LYMPHOMA TYPE, UNSPECIFIED BODY REGION: Primary | ICD-10-CM

## 2025-06-09 NOTE — PROGRESS NOTES
Hematology/Oncology Outpatient Follow Up    PATIENT NAME:Johnny Miller  :2000  MRN: [id number]  PRIMARY CARE PHYSICIAN: Graciela Cotter APRN  REFERRING PHYSICIAN: Graciela Cotter APRN    Chief Complaint   Patient presents with    Follow-up     Hodgkin lymphoma, unspecified Hodgkin lymphoma type, unspecified body region            HISTORY OF PRESENT ILLNESS:     24-year-old male who has been referred secondary to right supraclavicular mass.  Patient felt a lump on the right neck over the past 4 months.  At the time he noticed it was around the time he had COVID-19 infection in 2024.  He denies night sweats weight loss or fatigue symptoms.  Patient does not smoke.  He drinks only 1 beer per week.  As far as he knows there is no family history of cancer.  States that the mass on the right neck is not painful.     For that reason patient had an ultrasound of the neck completed in 2025 which basically revealed a 3.9 cm well-circumscribed mass right supraclavicular area suspicious characteristics.    For that reason he has been referred to us for further evaluation and management        Patient is alone today for this appointment.    2024: patient had CT scan of the neck and chest with basically revealed right supraclavicular mass measuring approximately 5 x 3 cm enlarged pathologic lymphadenopathy within the mediastinum partially visualized upper retroperitoneal area.  The above findings suggest lymphoproliferative disease  3/19/2025: Patient had additional labs including HIV which was negative LDH was normal, uric acid was normal, sed rate was 27 his white count is 7, hemoglobin 14.4 platelets are 268  4/3/2025: Patient had a PET CT scan which basically revealed hypermetabolic lymphadenopathy in the neck, chest abdomen   2025:: Patient had bone marrow aspiration and biopsy which did not show any evidence of lymphoma involvement.  Patient has hypocellularity of the bone marrow storage  iron is not identified flow cytometry was unremarkable and cytogenetics showed a normal male karyotype  4/15/2025: Patient received cycle 1 of combination chemotherapy with nivolumab AVD  5/6/25 Day 15 Cycle 1 chemotherapy with nivolumab   6/10/2025: Patient received cycle 2-day 15 of combination chemotherapy nivolumab AVD    5/14/25 HPI above reviewed and updated     Past Medical History:   Diagnosis Date    Acne     ADHD     Supraclavicular mass        Past Surgical History:   Procedure Laterality Date    CERVICAL LYMPH NODE BIOPSY/EXCISION Right 3/10/2025    Procedure: Right cervical mass/lymph node excisional biopsy;  Surgeon: Cami Bradford MD;  Location: [facility] MAIN OR;  Service: General;  Laterality: Right;    NO PAST SURGERIES      VENOUS ACCESS DEVICE (PORT) INSERTION Left 3/25/2025    Procedure: Port-A-Cath/PowerPort placement;  Surgeon: Cami Bradford MD;  Location: [facility] MAIN OR;  Service: General;  Laterality: Left;         Current Outpatient Medications:     allopurinol (Zyloprim) 300 MG tablet, Take 1 tablet by mouth Daily., Disp: 30 tablet, Rfl: 6    ASHWAGANDHA PO, Take 600 mg by mouth. (Patient not taking: Reported on 4/15/2025), Disp: , Rfl:     buPROPion XL (WELLBUTRIN XL) 150 MG 24 hr tablet, Take 1 tablet by mouth Every Morning. (Patient taking differently: Take 2 tablets by mouth Every Morning.), Disp: , Rfl:     cetirizine (zyrTEC) 10 MG tablet, Take 1 tablet by mouth Daily., Disp: , Rfl:     cholecalciferol (Vitamin D) 25 MCG (1000 UT) tablet, , Disp: , Rfl:     ferrous sulfate 325 (65 FE) MG tablet, Take 1 tablet by mouth Daily With Breakfast., Disp: 30 tablet, Rfl: 3    levoFLOXacin (LEVAQUIN) 500 MG tablet, Take 1 tablet by mouth Daily. (Patient not taking: Reported on 5/6/2025), Disp: 7 tablet, Rfl: 0    melatonin 5 MG tablet tablet, Take  by mouth., Disp: , Rfl:     Nystatin (magic mouthwash), Swish and spit 5 mL 4 (Four) Times a Day. 41 cc Nystatin 35 cc Benadryl 24 cc Viscous  Xylocaine, Disp: 100 mL, Rfl: 3    OLANZapine (ZyPREXA) 5 MG tablet, Take 1 tablet by mouth Every Night. Take 1 tablet at night on Days 1, 2, 3 and 4 and Days 15, 16, 17 and 18., Disp: 8 tablet, Rfl: 5    ondansetron (ZOFRAN) 8 MG tablet, Take 1 tablet by mouth 3 (Three) Times a Day As Needed for Nausea or Vomiting., Disp: 30 tablet, Rfl: 5    pyridoxine ([employer] B6) 100 MG tablet, Take 1 tablet by mouth Daily., Disp: , Rfl:     Zinc 50 MG tablet, Take  by mouth. (Patient not taking: Reported on 6/10/2025), Disp: , Rfl:   No current facility-administered medications for this visit.    Facility-Administered Medications Ordered in Other Visits:     heparin injection 500 Units, 500 Units, Intravenous, PRN, Alethea Mehta MD, 500 Units at 06/10/25 1348    sodium chloride 0.9 % flush 10 mL, 10 mL, Intravenous, PRN, Alethea Mehta MD, 10 mL at 06/10/25 1348    No Known Allergies    Family History   Problem Relation Age of Onset    Alcohol abuse Father     Diabetes Father        Cancer-related family history is not on file.    Social History     Tobacco Use    Smoking status: Never     Passive exposure: Never    Smokeless tobacco: Never   Vaping Use    Vaping status: Never Used   Substance Use Topics    Alcohol use: Yes     Alcohol/week: 2.0 standard drinks of alcohol     Types: 2 Cans of beer per week    Drug use: Never       I have reviewed and confirmed the accuracy of the patient's history: Chief complaint, HPI, ROS, and Subjective as entered by the MA/LPN/RN. Alethea Mehta MD 06/10/25      SUBJECTIVE:     He denies B symptoms.  Today to finalize his treatment plans.  He has a left Mediport.    Patient has had first cycle of chemotherapy.  He had some nausea he also complains of fatigue    5/14/25 Johnny os here for follow up. He is accompanied by his significant other. He reports he is doing ok. He reports some nausea without vomiting and attempting to keep po intake up. He also reports some  "fatigue the day after treatments .      Complains of weakness, difficulty swallowing, constipation General Fatigue.  Denies any fevers or chills    REVIEW OF SYSTEMS:  Review of Systems   Constitutional:  Positive for fatigue. Negative for chills and fever.   HENT:  Negative for congestion, drooling, ear discharge, rhinorrhea, sinus pressure and tinnitus.    Eyes:  Negative for photophobia, pain and discharge.   Respiratory:  Negative for apnea, choking and stridor.    Cardiovascular:  Negative for palpitations.   Gastrointestinal:  Positive for nausea. Negative for abdominal distention, abdominal pain, anal bleeding and vomiting.   Endocrine: Negative for polydipsia and polyphagia.   Genitourinary:  Negative for decreased urine volume, flank pain and genital sores.   Musculoskeletal:  Negative for gait problem, neck pain and neck stiffness.   Skin:  Negative for color change, rash and wound.   Neurological:  Negative for tremors, seizures, syncope, facial asymmetry and speech difficulty.   Hematological:  Negative for adenopathy.   Psychiatric/Behavioral:  Negative for agitation, confusion, hallucinations and self-injury. The patient is not hyperactive.        OBJECTIVE:    Vitals:    06/10/25 0935   BP: 117/81   Pulse: 78   Resp: 18   Temp: 98.4 °F (36.9 °C)   TempSrc: Temporal   SpO2: 98%   Weight: 121 kg (266 lb)   Height: 177.8 cm (70\")   PainSc: 0-No pain             Body mass index is 38.17 kg/m².    ECOG  (0) Fully active, able to carry on all predisease performance without restriction    Physical Exam  Vitals and nursing note reviewed.   Constitutional:       General: He is not in acute distress.     Appearance: Normal appearance. He is not diaphoretic.   HENT:      Head: Normocephalic and atraumatic.      Right Ear: External ear normal.      Left Ear: External ear normal.      Nose: Nose normal.      Mouth/Throat:      Mouth: Mucous membranes are moist.   Eyes:      General: No scleral icterus.        " Right eye: No discharge.         Left eye: No discharge.      Conjunctiva/sclera: Conjunctivae normal.   Neck:      Thyroid: No thyromegaly.   Cardiovascular:      Rate and Rhythm: Normal rate and regular rhythm.      Heart sounds: Normal heart sounds.      No friction rub. No gallop.   Pulmonary:      Effort: Pulmonary effort is normal. No respiratory distress.      Breath sounds: Normal breath sounds. No stridor. No wheezing.   Abdominal:      Palpations: Abdomen is soft.      Tenderness: There is no abdominal tenderness.   Musculoskeletal:         General: No tenderness. Normal range of motion.      Cervical back: Normal range of motion and neck supple.      Right lower leg: No edema.      Left lower leg: No edema.   Lymphadenopathy:      Cervical: No cervical adenopathy.   Skin:     General: Skin is warm.      Findings: No erythema or rash.   Neurological:      Mental Status: He is alert and oriented to person, place, and time.      Motor: No abnormal muscle tone.   Psychiatric:         Mood and Affect: Mood normal.         Behavior: Behavior normal.         Thought Content: Thought content normal.         Judgment: Judgment normal.         Right Supraclavicular zayra enlargement    Left Mediport in place    I have reexamined the patient and the results are consistent with the previously documented exam. Alethea Denisha Mehta MD      RECENT LABS    WBC   Date Value Ref Range Status   06/10/2025 5.51 3.40 - 10.80 10*3/mm3 Final     RBC   Date Value Ref Range Status   06/10/2025 4.40 4.14 - 5.80 10*6/mm3 Final     Hemoglobin   Date Value Ref Range Status   06/10/2025 13.0 13.0 - 17.7 g/dL Final     Hematocrit   Date Value Ref Range Status   06/10/2025 39.5 37.5 - 51.0 % Final     MCV   Date Value Ref Range Status   06/10/2025 89.8 79.0 - 97.0 fL Final     MCH   Date Value Ref Range Status   06/10/2025 29.5 26.6 - 33.0 pg Final     MCHC   Date Value Ref Range Status   06/10/2025 32.9 31.5 - 35.7 g/dL Final     RDW    Date Value Ref Range Status   06/10/2025 15.6 (H) 12.3 - 15.4 % Final     RDW-SD   Date Value Ref Range Status   06/10/2025 49.7 37.0 - 54.0 fl Final     MPV   Date Value Ref Range Status   06/10/2025 9.6 6.0 - 12.0 fL Final     Platelets   Date Value Ref Range Status   06/10/2025 230 140 - 450 10*3/mm3 Final     Neutrophil %   Date Value Ref Range Status   06/10/2025 43.8 42.7 - 76.0 % Final     Lymphocyte %   Date Value Ref Range Status   06/10/2025 40.3 19.6 - 45.3 % Final     Monocyte %   Date Value Ref Range Status   06/10/2025 13.2 (H) 5.0 - 12.0 % Final     Eosinophil %   Date Value Ref Range Status   06/10/2025 2.0 0.3 - 6.2 % Final     Basophil %   Date Value Ref Range Status   06/10/2025 0.7 0.0 - 1.5 % Final     Immature Grans %   Date Value Ref Range Status   04/08/2025 0.2 0.0 - 0.5 % Final     Neutrophils, Absolute   Date Value Ref Range Status   06/10/2025 2.41 1.70 - 7.00 10*3/mm3 Final     Lymphocytes, Absolute   Date Value Ref Range Status   06/10/2025 2.22 0.70 - 3.10 10*3/mm3 Final     Monocytes, Absolute   Date Value Ref Range Status   06/10/2025 0.73 0.10 - 0.90 10*3/mm3 Final     Eosinophils, Absolute   Date Value Ref Range Status   06/10/2025 0.11 0.00 - 0.40 10*3/mm3 Final     Basophils, Absolute   Date Value Ref Range Status   06/10/2025 0.04 0.00 - 0.20 10*3/mm3 Final     Immature Grans, Absolute   Date Value Ref Range Status   04/08/2025 0.02 0.00 - 0.05 10*3/mm3 Final     nRBC   Date Value Ref Range Status   04/08/2025 0.0 0.0 - 0.2 /100 WBC Final       Lab Results   Component Value Date    GLUCOSE 111 (H) 06/10/2025    BUN 15.1 06/10/2025    CREATININE 1.10 06/10/2025    EGFRIFNONA 82 02/05/2021    BCR 13.7 06/10/2025    K 3.9 06/10/2025    CO2 23.5 06/10/2025    CALCIUM 8.9 06/10/2025    ALBUMIN 4.0 06/10/2025    AST 36 06/10/2025    ALT 89 (H) 06/10/2025         Assessment & Plan     Hodgkin lymphoma, unspecified Hodgkin lymphoma type, unspecified body region  - CBC &  Differential        ASSESSMENT:    Supraclavicular mass  - CBC & Differential        Classical Hodgkin's lymphoma nodular sclerosis status post biopsy of right supraclavicular mass.  No B symptoms.  Clinical stage III disease as patient has lymphadenopathy above and below the diaphragm.  Bone marrow biopsy does not show any evidence of lymphoma  Hypocellular bone marrow: Unknown etiology  Absent iron stores in the bone marrow.  He had a baseline normal hemoglobin prior to chemo  Patient is on combination of nivolumab AVD with plan to treat with 6 total cycles  Neutropenia secondary to chemotherapy now on Neulasta counts have stabilized  Chemotherapy induced fatigue  Chemotherapy-induced nausea  Fertility preservation discussion.  Patient has opted to proceed with this and has all the information he needs  I recommended combination chemotherapy with modification of his regimen to include nivolumab plus AVD based on recent clinical trial showing improved progression free survivorship at approximately 92% versus 83% compared to BV AVD.  Patient is a Non-smoker       Discussion    Reviewed diagnosis of Hodgkin's lymphoma  Discussed that this is a curable disease  Discussed the need for complete staging  Discussed that there is significant role of chemotherapy  Reviewed  Nivolumab plus AVD regimen in detail    I reviewed the benefits the side effects  Chemotherapy side effects include, but not limited to, nausea, vomiting, bone marrow suppression, which can result in blood, platelet transfusion. There is also risk of permanent bone marrow destruction, which can cause myelodysplastic syndrome or leukemia years down the line. There is risk of infection which can result in hospitalization and even death. There is also risk of fatigue, asthenia, alopecia which could become permanent. Chemo will help to reduce risk of relapse of cancer, but does not eliminate risk completely.     Discussed that we will avoid bleomycin with  this regimen.  He Does not have any contraindication to nivolumab  Adriamycin can cause cardiotoxicity     Side effects of nivolumab to include but not limited to    Diarrhea, fatigue, pruritus and rash.  Also included pulmonary toxicity, hepatotoxicity, nephrotoxicity as well as neurotoxicity. There is potential for endocrine and metabolic abnormalities including hyperglycemia, hypertriglyceridemia, hyponatremia, phosphorus abnormalities, hypothyroidism or hyperthyroidism.  There could also be pancytopenia, risk of infection and peripheral neuropathy.  Discussed lab monitoring that will be needed while on continuous immunotherapy and medicines to help with supportive care.          Plans      Continue with chemotherapy plan continue supportive care    Continue iron supplements 325 mg p.o. daily, continue same   Urinalysis to evaluate for hematuria normal on 4/21/25   Reviewed cbc WBC 4.2 hemoglobin 13.9 platelets 292,000 ANC 2.52   Handicapped parking placard   Continue supportive care  All questions answered     FU in 3 weeks      Continue chemotherapy with nivolumab AVD      Give patient information on nivolumab during past appointment     Continue  allopurinol 300 mg p.o. nightly                     History & physical (H&P) including: B symptoms (unexplained fever   >38°C; drenching night sweats; or weight loss >10% of body weight   within 6 mo of diagnosis), alcohol intolerance, pruritus, fatigue,   performance status, and examination of lymphoid regions, spleen,   and liver   Complete blood count (CBC), differential   Erythrocyte sedimentation rate (ESR)   Comprehensive metabolic panel, lactate dehydrogenase (LDH), and   liver function test (LFT)   Human immunodeficiency virus (HIV) testing (See NCCN Guidelines   for Cancer in People with HIV)   Pregnancy test for those of childbearing potential prior to cytotoxic   chemotherapy or radiation therapy (RT)   FDG-PET/CT scan (skull base to mid-thigh or vertex to  feet in   selected cases)c,d   Counseling: Fertility/psychosociale and smoking cessation (See   NCCN Guidelines for Smoking Cessation)   Useful in selected cases:   Fertility preservatione,f   Pulmonary function tests ([PFTs] including diffusing capacity of the   lung for carbon monoxide [DLCO])g if ABVDh,i is being used   Hepatitis B/C testing (encouraged)   Diagnostic CTj (contrast-enhanced)    Chest x-ray (encouraged, especially if large mediastinal mass)   Adequate bone marrow biopsy if there are unexplained cytopenias   other than anemia and negative FDG-PETk   Echocardiogram or multigated acquisition (MUGA) scan and   consideration of atorvastatin1 if anthracycline-based chemotherapy   is indicated   MRI of select sites, with contrast unless contraindicated         I spent 30 total minutes, face-to-face, caring for Johnny today. 90% of this time involved counseling and/or coordination of care as documented within this note.     Electronically signed by Alethea Mehta MD, 06/10/25, 6:58 PM EDT.

## 2025-06-10 ENCOUNTER — HOSPITAL ENCOUNTER (OUTPATIENT)
Dept: ONCOLOGY | Facility: HOSPITAL | Age: 25
Discharge: HOME | End: 2025-06-10
Payer: COMMERCIAL

## 2025-06-10 ENCOUNTER — OFFICE VISIT (OUTPATIENT)
Dept: ONCOLOGY | Facility: CLINIC | Age: 25
End: 2025-06-10
Payer: COMMERCIAL

## 2025-06-10 VITALS
HEIGHT: 70 IN | DIASTOLIC BLOOD PRESSURE: 81 MMHG | RESPIRATION RATE: 18 BRPM | WEIGHT: 266 LBS | TEMPERATURE: 98.4 F | BODY MASS INDEX: 38.08 KG/M2 | OXYGEN SATURATION: 98 % | HEART RATE: 78 BPM | SYSTOLIC BLOOD PRESSURE: 117 MMHG

## 2025-06-10 DIAGNOSIS — C81.90 HODGKIN LYMPHOMA, UNSPECIFIED HODGKIN LYMPHOMA TYPE, UNSPECIFIED BODY REGION: Primary | ICD-10-CM

## 2025-06-10 PROBLEM — E66.3 OVERWEIGHT: Status: ACTIVE | Noted: 2025-06-10

## 2025-06-10 LAB
ALBUMIN SERPL-MCNC: 4 G/DL (ref 3.5–5.2)
ALBUMIN/GLOB SERPL: 1.5 G/DL
ALP BLD-CCNC: 108 U/L (ref 53–128)
ALP SERPL-CCNC: 111 U/L (ref 39–117)
ALT SERPL W P-5'-P-CCNC: 89 U/L (ref 1–41)
ANION GAP SERPL CALCULATED.3IONS-SCNC: 9.5 MMOL/L (ref 5–15)
AST SERPL-CCNC: 36 U/L (ref 1–40)
BASOPHILS # BLD AUTO: 0.04 10*3/MM3 (ref 0–0.2)
BASOPHILS NFR BLD AUTO: 0.7 % (ref 0–1.5)
BILIRUB SERPL-MCNC: 0.2 MG/DL (ref 0–1.2)
BUN BLDA-MCNC: 15 MG/DL (ref 7–22)
BUN SERPL-MCNC: 15.1 MG/DL (ref 6–20)
BUN/CREAT SERPL: 13.7 (ref 7–25)
CALCIUM BLD QL: 9.1 MG/DL (ref 8–10.3)
CALCIUM SPEC-SCNC: 8.9 MG/DL (ref 8.6–10.5)
CHLORIDE BLDA-SCNC: 111 MMOL/L (ref 98–108)
CHLORIDE SERPL-SCNC: 105 MMOL/L (ref 98–107)
CO2 BLDA-SCNC: 25 MMOL/L (ref 18–33)
CO2 SERPL-SCNC: 23.5 MMOL/L (ref 22–29)
CREAT BLDA-MCNC: 1.1 MG/DL (ref 0.6–1.2)
CREAT SERPL-MCNC: 1.1 MG/DL (ref 0.76–1.27)
DEPRECATED RDW RBC AUTO: 49.7 FL (ref 37–54)
EGFRCR SERPLBLD CKD-EPI 2021: 94.9 ML/MIN/1.73
EGFRCR SERPLBLD CKD-EPI 2021: 94.9 ML/MIN/1.73
EOSINOPHIL # BLD AUTO: 0.11 10*3/MM3 (ref 0–0.4)
EOSINOPHIL NFR BLD AUTO: 2 % (ref 0.3–6.2)
ERYTHROCYTE [DISTWIDTH] IN BLOOD BY AUTOMATED COUNT: 15.6 % (ref 12.3–15.4)
GLOBULIN UR ELPH-MCNC: 2.7 GM/DL
GLUCOSE BLDC GLUCOMTR-MCNC: 110 MG/DL (ref 73–118)
GLUCOSE SERPL-MCNC: 111 MG/DL (ref 65–99)
HCT VFR BLD AUTO: 39.5 % (ref 37.5–51)
HGB BLD-MCNC: 13 G/DL (ref 13–17.7)
LYMPHOCYTES # BLD AUTO: 2.22 10*3/MM3 (ref 0.7–3.1)
LYMPHOCYTES NFR BLD AUTO: 40.3 % (ref 19.6–45.3)
MCH RBC QN AUTO: 29.5 PG (ref 26.6–33)
MCHC RBC AUTO-ENTMCNC: 32.9 G/DL (ref 31.5–35.7)
MCV RBC AUTO: 89.8 FL (ref 79–97)
MONOCYTES # BLD AUTO: 0.73 10*3/MM3 (ref 0.1–0.9)
MONOCYTES NFR BLD AUTO: 13.2 % (ref 5–12)
NEUTROPHILS NFR BLD AUTO: 2.41 10*3/MM3 (ref 1.7–7)
NEUTROPHILS NFR BLD AUTO: 43.8 % (ref 42.7–76)
PLATELET # BLD AUTO: 230 10*3/MM3 (ref 140–450)
PMV BLD AUTO: 9.6 FL (ref 6–12)
POC ALBUMIN: 3.4 G/L (ref 3.3–5.5)
POC ALT (SGPT): 71 U/L (ref 10–47)
POC AST (SGOT): 46 U/L (ref 11–38)
POC TOTAL BILIRUBIN: 0.5 MG/DL (ref 0.2–1.6)
POC TOTAL PROTEIN: 6.7 G/DL (ref 6.4–8.1)
POTASSIUM BLDA-SCNC: 4.2 MMOL/L (ref 3.6–5.1)
POTASSIUM SERPL-SCNC: 3.9 MMOL/L (ref 3.5–5.2)
PROT SERPL-MCNC: 6.7 G/DL (ref 6–8.5)
RBC # BLD AUTO: 4.4 10*6/MM3 (ref 4.14–5.8)
SODIUM BLD-SCNC: 139 MMOL/L (ref 128–145)
SODIUM SERPL-SCNC: 138 MMOL/L (ref 136–145)
WBC NRBC COR # BLD AUTO: 5.51 10*3/MM3 (ref 3.4–10.8)

## 2025-06-10 PROCEDURE — 96377 APPLICATON ON-BODY INJECTOR: CPT

## 2025-06-10 PROCEDURE — 25010000002 HEPARIN LOCK FLUSH PER 10 UNITS: Performed by: INTERNAL MEDICINE

## 2025-06-10 PROCEDURE — 85025 COMPLETE CBC W/AUTO DIFF WBC: CPT | Performed by: INTERNAL MEDICINE

## 2025-06-10 PROCEDURE — 96375 TX/PRO/DX INJ NEW DRUG ADDON: CPT

## 2025-06-10 PROCEDURE — 80053 COMPREHEN METABOLIC PANEL: CPT | Performed by: INTERNAL MEDICINE

## 2025-06-10 PROCEDURE — 25010000002 NIVOLUMAB 240 MG/24ML SOLUTION 24 ML VIAL: Performed by: INTERNAL MEDICINE

## 2025-06-10 PROCEDURE — 25010000002 VINBLASTINE PER 1 MG: Performed by: INTERNAL MEDICINE

## 2025-06-10 PROCEDURE — 80053 COMPREHEN METABOLIC PANEL: CPT

## 2025-06-10 PROCEDURE — 36591 DRAW BLOOD OFF VENOUS DEVICE: CPT

## 2025-06-10 PROCEDURE — 25010000002 DACARBAZINE PER 100 MG: Performed by: INTERNAL MEDICINE

## 2025-06-10 PROCEDURE — 99214 OFFICE O/P EST MOD 30 MIN: CPT | Performed by: INTERNAL MEDICINE

## 2025-06-10 PROCEDURE — 25010000002 FOSAPREPITANT PER 1 MG: Performed by: INTERNAL MEDICINE

## 2025-06-10 PROCEDURE — 25810000003 SODIUM CHLORIDE 0.9 % SOLUTION: Performed by: INTERNAL MEDICINE

## 2025-06-10 PROCEDURE — 96367 TX/PROPH/DG ADDL SEQ IV INF: CPT

## 2025-06-10 PROCEDURE — 25010000002 PEGFILGRASTIM-CBQV 6 MG/0.6ML SOLUTION PREFILLED SYRINGE: Performed by: INTERNAL MEDICINE

## 2025-06-10 PROCEDURE — 96411 CHEMO IV PUSH ADDL DRUG: CPT

## 2025-06-10 PROCEDURE — 25010000002 DOXORUBICIN PER 10 MG: Performed by: INTERNAL MEDICINE

## 2025-06-10 PROCEDURE — 25010000002 DACARBAZINE PER 200 MG: Performed by: INTERNAL MEDICINE

## 2025-06-10 PROCEDURE — 25010000002 PALONOSETRON 0.25 MG/5ML SOLUTION PREFILLED SYRINGE: Performed by: INTERNAL MEDICINE

## 2025-06-10 PROCEDURE — 96417 CHEMO IV INFUS EACH ADDL SEQ: CPT

## 2025-06-10 PROCEDURE — 25010000002 DEXAMETHASONE PER 1 MG: Performed by: INTERNAL MEDICINE

## 2025-06-10 PROCEDURE — 25810000003 SODIUM CHLORIDE 0.9 % SOLUTION 250 ML FLEX CONT: Performed by: INTERNAL MEDICINE

## 2025-06-10 PROCEDURE — 96413 CHEMO IV INFUSION 1 HR: CPT

## 2025-06-10 RX ORDER — DEXAMETHASONE SODIUM PHOSPHATE 4 MG/ML
12 INJECTION, SOLUTION INTRA-ARTICULAR; INTRALESIONAL; INTRAMUSCULAR; INTRAVENOUS; SOFT TISSUE ONCE
Status: COMPLETED | OUTPATIENT
Start: 2025-06-10 | End: 2025-06-10

## 2025-06-10 RX ORDER — HEPARIN SODIUM (PORCINE) LOCK FLUSH IV SOLN 100 UNIT/ML 100 UNIT/ML
500 SOLUTION INTRAVENOUS AS NEEDED
Status: DISCONTINUED | OUTPATIENT
Start: 2025-06-10 | End: 2025-06-11 | Stop reason: HOSPADM

## 2025-06-10 RX ORDER — DOXORUBICIN HYDROCHLORIDE 2 MG/ML
25 INJECTION, SOLUTION INTRAVENOUS ONCE
Status: CANCELLED | OUTPATIENT
Start: 2025-06-10

## 2025-06-10 RX ORDER — HEPARIN SODIUM (PORCINE) LOCK FLUSH IV SOLN 100 UNIT/ML 100 UNIT/ML
500 SOLUTION INTRAVENOUS AS NEEDED
OUTPATIENT
Start: 2025-06-10

## 2025-06-10 RX ORDER — DOXORUBICIN HYDROCHLORIDE 2 MG/ML
25 INJECTION, SOLUTION INTRAVENOUS ONCE
Status: COMPLETED | OUTPATIENT
Start: 2025-06-10 | End: 2025-06-10

## 2025-06-10 RX ORDER — PALONOSETRON 0.05 MG/ML
0.25 INJECTION, SOLUTION INTRAVENOUS ONCE
Status: CANCELLED | OUTPATIENT
Start: 2025-06-10

## 2025-06-10 RX ORDER — SODIUM CHLORIDE 0.9 % (FLUSH) 0.9 %
10 SYRINGE (ML) INJECTION AS NEEDED
OUTPATIENT
Start: 2025-06-10

## 2025-06-10 RX ORDER — SODIUM CHLORIDE 9 MG/ML
20 INJECTION, SOLUTION INTRAVENOUS ONCE
Status: COMPLETED | OUTPATIENT
Start: 2025-06-10 | End: 2025-06-10

## 2025-06-10 RX ORDER — SODIUM CHLORIDE 9 MG/ML
20 INJECTION, SOLUTION INTRAVENOUS ONCE
Status: CANCELLED | OUTPATIENT
Start: 2025-06-10

## 2025-06-10 RX ORDER — SODIUM CHLORIDE 0.9 % (FLUSH) 0.9 %
10 SYRINGE (ML) INJECTION AS NEEDED
Status: DISCONTINUED | OUTPATIENT
Start: 2025-06-10 | End: 2025-06-11 | Stop reason: HOSPADM

## 2025-06-10 RX ORDER — PALONOSETRON 0.05 MG/ML
0.25 INJECTION, SOLUTION INTRAVENOUS ONCE
Status: COMPLETED | OUTPATIENT
Start: 2025-06-10 | End: 2025-06-10

## 2025-06-10 RX ADMIN — DEXAMETHASONE SODIUM PHOSPHATE 12 MG: 4 INJECTION, SOLUTION INTRAMUSCULAR; INTRAVENOUS at 11:01

## 2025-06-10 RX ADMIN — DACARBAZINE 890 MG: 10 INJECTION, POWDER, FOR SOLUTION INTRAVENOUS at 12:37

## 2025-06-10 RX ADMIN — SODIUM CHLORIDE 240 MG: 9 INJECTION, SOLUTION INTRAVENOUS at 13:15

## 2025-06-10 RX ADMIN — Medication 10 ML: at 13:48

## 2025-06-10 RX ADMIN — HEPARIN 500 UNITS: 100 SYRINGE at 13:48

## 2025-06-10 RX ADMIN — PEGFILGRASTIM-CBQV 6 MG: 6 INJECTION, SOLUTION SUBCUTANEOUS at 13:48

## 2025-06-10 RX ADMIN — PALONOSETRON 0.25 MG: 0.25 INJECTION, SOLUTION INTRAVENOUS at 11:00

## 2025-06-10 RX ADMIN — FOSAPREPITANT 100 ML: 150 INJECTION, POWDER, LYOPHILIZED, FOR SOLUTION INTRAVENOUS at 11:04

## 2025-06-10 RX ADMIN — SODIUM CHLORIDE 20 ML/HR: 9 INJECTION, SOLUTION INTRAVENOUS at 10:59

## 2025-06-10 RX ADMIN — VINBLASTINE SULFATE 14 MG: 1 INJECTION INTRAVENOUS at 12:27

## 2025-06-10 RX ADMIN — DOXORUBICIN HYDROCHLORIDE 30 MG: 2 INJECTION, SOLUTION INTRAVENOUS at 12:19

## 2025-06-10 NOTE — PROGRESS NOTES
Port accessed and flushed with good blood return noted. 10cc of blood wasted prior to specimen collection. Blood specimen obtained and sent to lab for processing per protocol.  Port flushed with saline. Pt to lobby for md kaplant.

## 2025-06-12 DIAGNOSIS — C81.90 HODGKIN LYMPHOMA, UNSPECIFIED HODGKIN LYMPHOMA TYPE, UNSPECIFIED BODY REGION: ICD-10-CM

## 2025-06-13 RX ORDER — ALLOPURINOL 300 MG/1
300 TABLET ORAL DAILY
Qty: 30 TABLET | Refills: 6 | Status: SHIPPED | OUTPATIENT
Start: 2025-06-13

## 2025-06-13 RX ORDER — OLANZAPINE 5 MG/1
5 TABLET, FILM COATED ORAL NIGHTLY
Qty: 8 TABLET | Refills: 5 | Status: SHIPPED | OUTPATIENT
Start: 2025-06-13

## 2025-06-13 RX ORDER — FERROUS SULFATE 325(65) MG
325 TABLET ORAL
Qty: 30 TABLET | Refills: 3 | Status: SHIPPED | OUTPATIENT
Start: 2025-06-13

## 2025-06-13 RX ORDER — ONDANSETRON 8 MG/1
8 TABLET, FILM COATED ORAL 3 TIMES DAILY PRN
Qty: 30 TABLET | Refills: 5 | Status: SHIPPED | OUTPATIENT
Start: 2025-06-13

## 2025-06-16 ENCOUNTER — TELEPHONE (OUTPATIENT)
Dept: ONCOLOGY | Facility: CLINIC | Age: 25
End: 2025-06-16
Payer: COMMERCIAL

## 2025-06-16 NOTE — TELEPHONE ENCOUNTER
Spoke w/ pt and let him know that cycle 4 is not until 7/22/25 and his scan will not be due until after that.  I let him know that he will see Dr. Mehta prior to that and she will order it at that time and then it will be scheduled after that.  He v/u.

## 2025-06-16 NOTE — TELEPHONE ENCOUNTER
Caller: Johnny Miller    Relationship: Self    Best call back number: [phone]    What is the best time to reach you: ANYTIME     Who are you requesting to speak with (clinical staff, provider,  specific staff member): CLINICAL    What was the call regarding: PT IS CALLING TO CHECK IF THE ORDERS HAVE WENT IN FOR HIS PET SCAN, STATES DR MENDOZA WANTED HIM TO HAVE THE SCAN AFTER HIS 4TH TREATMENT AND IS UPSET THAT THIS HAS NOT BEEN SCHEDULED.    REQUESTING A CALL BACK TODAY TO ADVISE

## 2025-06-24 ENCOUNTER — HOSPITAL ENCOUNTER (OUTPATIENT)
Dept: ONCOLOGY | Facility: HOSPITAL | Age: 25
Discharge: HOME | End: 2025-06-24
Admitting: INTERNAL MEDICINE
Payer: COMMERCIAL

## 2025-06-24 VITALS
OXYGEN SATURATION: 97 % | HEIGHT: 70 IN | WEIGHT: 266.4 LBS | HEART RATE: 85 BPM | TEMPERATURE: 97.7 F | DIASTOLIC BLOOD PRESSURE: 79 MMHG | SYSTOLIC BLOOD PRESSURE: 112 MMHG | BODY MASS INDEX: 38.14 KG/M2 | RESPIRATION RATE: 16 BRPM

## 2025-06-24 DIAGNOSIS — Z51.11 ENCOUNTER FOR ANTINEOPLASTIC CHEMOTHERAPY: ICD-10-CM

## 2025-06-24 DIAGNOSIS — C81.90 HODGKIN LYMPHOMA, UNSPECIFIED HODGKIN LYMPHOMA TYPE, UNSPECIFIED BODY REGION: Primary | ICD-10-CM

## 2025-06-24 LAB
ALP BLD-CCNC: 97 U/L (ref 53–128)
BASOPHILS # BLD AUTO: 0.05 10*3/MM3 (ref 0–0.2)
BASOPHILS NFR BLD AUTO: 0.6 % (ref 0–1.5)
BUN BLDA-MCNC: 11 MG/DL (ref 7–22)
CALCIUM BLD QL: 8.8 MG/DL (ref 8–10.3)
CHLORIDE BLDA-SCNC: 111 MMOL/L (ref 98–108)
CO2 BLDA-SCNC: 26 MMOL/L (ref 18–33)
CREAT BLDA-MCNC: 1 MG/DL (ref 0.6–1.2)
DEPRECATED RDW RBC AUTO: 54 FL (ref 37–54)
EGFRCR SERPLBLD CKD-EPI 2021: 106.5 ML/MIN/1.73
EOSINOPHIL # BLD AUTO: 0.09 10*3/MM3 (ref 0–0.4)
EOSINOPHIL NFR BLD AUTO: 1.1 % (ref 0.3–6.2)
ERYTHROCYTE [DISTWIDTH] IN BLOOD BY AUTOMATED COUNT: 16.6 % (ref 12.3–15.4)
GLUCOSE BLDC GLUCOMTR-MCNC: 100 MG/DL (ref 73–118)
HCT VFR BLD AUTO: 38.7 % (ref 37.5–51)
HGB BLD-MCNC: 13 G/DL (ref 13–17.7)
LYMPHOCYTES # BLD AUTO: 3.07 10*3/MM3 (ref 0.7–3.1)
LYMPHOCYTES NFR BLD AUTO: 37.1 % (ref 19.6–45.3)
MCH RBC QN AUTO: 30.7 PG (ref 26.6–33)
MCHC RBC AUTO-ENTMCNC: 33.6 G/DL (ref 31.5–35.7)
MCV RBC AUTO: 91.5 FL (ref 79–97)
MONOCYTES # BLD AUTO: 0.79 10*3/MM3 (ref 0.1–0.9)
MONOCYTES NFR BLD AUTO: 9.5 % (ref 5–12)
NEUTROPHILS NFR BLD AUTO: 4.28 10*3/MM3 (ref 1.7–7)
NEUTROPHILS NFR BLD AUTO: 51.7 % (ref 42.7–76)
PLATELET # BLD AUTO: 214 10*3/MM3 (ref 140–450)
PMV BLD AUTO: 9.8 FL (ref 6–12)
POC ALBUMIN: 3.5 G/L (ref 3.3–5.5)
POC ALT (SGPT): 66 U/L (ref 10–47)
POC AST (SGOT): 31 U/L (ref 11–38)
POC TOTAL BILIRUBIN: 0.6 MG/DL (ref 0.2–1.6)
POC TOTAL PROTEIN: 6.9 G/DL (ref 6.4–8.1)
POTASSIUM BLDA-SCNC: 4.1 MMOL/L (ref 3.6–5.1)
RBC # BLD AUTO: 4.23 10*6/MM3 (ref 4.14–5.8)
SODIUM BLD-SCNC: 140 MMOL/L (ref 128–145)
T4 SERPL-MCNC: 7.02 MCG/DL (ref 4.5–11.7)
TSH SERPL DL<=0.05 MIU/L-ACNC: 4.49 UIU/ML (ref 0.27–4.2)
WBC NRBC COR # BLD AUTO: 8.28 10*3/MM3 (ref 3.4–10.8)

## 2025-06-24 PROCEDURE — 25010000002 HEPARIN LOCK FLUSH PER 10 UNITS: Performed by: INTERNAL MEDICINE

## 2025-06-24 PROCEDURE — 96367 TX/PROPH/DG ADDL SEQ IV INF: CPT

## 2025-06-24 PROCEDURE — 80053 COMPREHEN METABOLIC PANEL: CPT

## 2025-06-24 PROCEDURE — 25010000002 DEXAMETHASONE PER 1 MG: Performed by: NURSE PRACTITIONER

## 2025-06-24 PROCEDURE — 85025 COMPLETE CBC W/AUTO DIFF WBC: CPT | Performed by: INTERNAL MEDICINE

## 2025-06-24 PROCEDURE — 25010000002 DACARBAZINE PER 100 MG: Performed by: NURSE PRACTITIONER

## 2025-06-24 PROCEDURE — 25010000002 NIVOLUMAB 240 MG/24ML SOLUTION 24 ML VIAL: Performed by: NURSE PRACTITIONER

## 2025-06-24 PROCEDURE — 96411 CHEMO IV PUSH ADDL DRUG: CPT

## 2025-06-24 PROCEDURE — 25010000002 DACARBAZINE PER 200 MG: Performed by: NURSE PRACTITIONER

## 2025-06-24 PROCEDURE — 25010000002 PEGFILGRASTIM-CBQV 6 MG/0.6ML SOLUTION PREFILLED SYRINGE: Performed by: NURSE PRACTITIONER

## 2025-06-24 PROCEDURE — 96413 CHEMO IV INFUSION 1 HR: CPT

## 2025-06-24 PROCEDURE — 25010000002 DOXORUBICIN PER 10 MG: Performed by: NURSE PRACTITIONER

## 2025-06-24 PROCEDURE — 25810000003 SODIUM CHLORIDE 0.9 % SOLUTION 250 ML FLEX CONT: Performed by: NURSE PRACTITIONER

## 2025-06-24 PROCEDURE — 25010000002 PALONOSETRON 0.25 MG/5ML SOLUTION PREFILLED SYRINGE: Performed by: NURSE PRACTITIONER

## 2025-06-24 PROCEDURE — 25010000002 VINBLASTINE PER 1 MG: Performed by: NURSE PRACTITIONER

## 2025-06-24 PROCEDURE — 84443 ASSAY THYROID STIM HORMONE: CPT | Performed by: INTERNAL MEDICINE

## 2025-06-24 PROCEDURE — 25810000003 SODIUM CHLORIDE 0.9 % SOLUTION: Performed by: NURSE PRACTITIONER

## 2025-06-24 PROCEDURE — 25010000002 FOSAPREPITANT PER 1 MG: Performed by: NURSE PRACTITIONER

## 2025-06-24 PROCEDURE — 84436 ASSAY OF TOTAL THYROXINE: CPT | Performed by: INTERNAL MEDICINE

## 2025-06-24 PROCEDURE — 96377 APPLICATON ON-BODY INJECTOR: CPT

## 2025-06-24 PROCEDURE — 96417 CHEMO IV INFUS EACH ADDL SEQ: CPT

## 2025-06-24 PROCEDURE — 96375 TX/PRO/DX INJ NEW DRUG ADDON: CPT

## 2025-06-24 RX ORDER — SODIUM CHLORIDE 9 MG/ML
20 INJECTION, SOLUTION INTRAVENOUS ONCE
Status: COMPLETED | OUTPATIENT
Start: 2025-06-24 | End: 2025-06-24

## 2025-06-24 RX ORDER — DOXORUBICIN HYDROCHLORIDE 2 MG/ML
25 INJECTION, SOLUTION INTRAVENOUS ONCE
Status: COMPLETED | OUTPATIENT
Start: 2025-06-24 | End: 2025-06-24

## 2025-06-24 RX ORDER — DOXORUBICIN HYDROCHLORIDE 2 MG/ML
25 INJECTION, SOLUTION INTRAVENOUS ONCE
Status: CANCELLED | OUTPATIENT
Start: 2025-06-24

## 2025-06-24 RX ORDER — HEPARIN SODIUM (PORCINE) LOCK FLUSH IV SOLN 100 UNIT/ML 100 UNIT/ML
500 SOLUTION INTRAVENOUS AS NEEDED
OUTPATIENT
Start: 2025-06-24

## 2025-06-24 RX ORDER — SODIUM CHLORIDE 0.9 % (FLUSH) 0.9 %
10 SYRINGE (ML) INJECTION AS NEEDED
OUTPATIENT
Start: 2025-06-24

## 2025-06-24 RX ORDER — SODIUM CHLORIDE 9 MG/ML
20 INJECTION, SOLUTION INTRAVENOUS ONCE
Status: CANCELLED | OUTPATIENT
Start: 2025-06-24

## 2025-06-24 RX ORDER — PALONOSETRON 0.05 MG/ML
0.25 INJECTION, SOLUTION INTRAVENOUS ONCE
Status: COMPLETED | OUTPATIENT
Start: 2025-06-24 | End: 2025-06-24

## 2025-06-24 RX ORDER — HEPARIN SODIUM (PORCINE) LOCK FLUSH IV SOLN 100 UNIT/ML 100 UNIT/ML
500 SOLUTION INTRAVENOUS AS NEEDED
Status: DISCONTINUED | OUTPATIENT
Start: 2025-06-24 | End: 2025-06-25 | Stop reason: HOSPADM

## 2025-06-24 RX ORDER — DEXAMETHASONE SODIUM PHOSPHATE 4 MG/ML
12 INJECTION, SOLUTION INTRA-ARTICULAR; INTRALESIONAL; INTRAMUSCULAR; INTRAVENOUS; SOFT TISSUE ONCE
Status: COMPLETED | OUTPATIENT
Start: 2025-06-24 | End: 2025-06-24

## 2025-06-24 RX ORDER — SODIUM CHLORIDE 0.9 % (FLUSH) 0.9 %
10 SYRINGE (ML) INJECTION AS NEEDED
Status: DISCONTINUED | OUTPATIENT
Start: 2025-06-24 | End: 2025-06-25 | Stop reason: HOSPADM

## 2025-06-24 RX ORDER — PALONOSETRON 0.05 MG/ML
0.25 INJECTION, SOLUTION INTRAVENOUS ONCE
Status: CANCELLED | OUTPATIENT
Start: 2025-06-24

## 2025-06-24 RX ADMIN — DOXORUBICIN HYDROCHLORIDE 30 MG: 2 INJECTION, SOLUTION INTRAVENOUS at 10:29

## 2025-06-24 RX ADMIN — Medication 10 ML: at 12:08

## 2025-06-24 RX ADMIN — Medication 500 UNITS: at 12:08

## 2025-06-24 RX ADMIN — DACARBAZINE 890 MG: 10 INJECTION, POWDER, FOR SOLUTION INTRAVENOUS at 10:58

## 2025-06-24 RX ADMIN — VINBLASTINE SULFATE 14 MG: 1 INJECTION INTRAVENOUS at 10:43

## 2025-06-24 RX ADMIN — SODIUM CHLORIDE 20 ML/HR: 9 INJECTION, SOLUTION INTRAVENOUS at 09:51

## 2025-06-24 RX ADMIN — FOSAPREPITANT 100 ML: 150 INJECTION, POWDER, LYOPHILIZED, FOR SOLUTION INTRAVENOUS at 09:56

## 2025-06-24 RX ADMIN — PEGFILGRASTIM-CBQV 6 MG: 6 INJECTION, SOLUTION SUBCUTANEOUS at 12:11

## 2025-06-24 RX ADMIN — PALONOSETRON 0.25 MG: 0.25 INJECTION, SOLUTION INTRAVENOUS at 09:52

## 2025-06-24 RX ADMIN — SODIUM CHLORIDE 240 MG: 9 INJECTION, SOLUTION INTRAVENOUS at 11:34

## 2025-06-24 RX ADMIN — DEXAMETHASONE SODIUM PHOSPHATE 12 MG: 4 INJECTION, SOLUTION INTRAMUSCULAR; INTRAVENOUS at 09:53

## 2025-06-24 NOTE — PATIENT INSTRUCTIONS
Echocardiogram scheduled on 07/02/25. Please arrive at [facility] registration at 4:30.

## 2025-06-24 NOTE — ADDENDUM NOTE
Encounter addended by: Deb Das RN on: 6/24/2025 1:20 PM   Actions taken: Charge Capture section accepted

## 2025-06-25 ENCOUNTER — OFFICE VISIT (OUTPATIENT)
Dept: PULMONOLOGY | Facility: HOSPITAL | Age: 25
End: 2025-06-25
Payer: COMMERCIAL

## 2025-06-25 VITALS
WEIGHT: 260 LBS | DIASTOLIC BLOOD PRESSURE: 82 MMHG | BODY MASS INDEX: 37.22 KG/M2 | OXYGEN SATURATION: 96 % | RESPIRATION RATE: 14 BRPM | SYSTOLIC BLOOD PRESSURE: 118 MMHG | HEART RATE: 79 BPM | HEIGHT: 70 IN

## 2025-06-25 DIAGNOSIS — R05.9 COUGH, UNSPECIFIED TYPE: Primary | ICD-10-CM

## 2025-06-25 PROCEDURE — G0463 HOSPITAL OUTPT CLINIC VISIT: HCPCS

## 2025-06-25 NOTE — PROGRESS NOTES
PULMONARY/ CRITICAL CARE/ SLEEP MEDICINE OUTPATIENT CONSULT/ FOLLOW UP NOTE        Patient Name:  Johnny Miller    :  2000    Medical Record:  [id number]    PRIMARY CARE PHYSICIAN     Graciela Cotter APRN    REASON FOR CONSULTATION    Johnny Miller is a 24 y.o. male who is referred for consultation for cough  REVIEW OF SYSTEMS    Constitutional:  Denies fever or chills   Eyes:  Denies change in visual acuity   HENT:  Denies nasal congestion or sore throat   Respiratory:  Denies cough or shortness of breath   Cardiovascular:  Denies chest pain or edema   GI:  Denies abdominal pain, nausea, vomiting, bloody stools or diarrhea   :  Denies dysuria   Musculoskeletal:  Denies back pain or joint pain   Integument:  Denies rash   Neurologic:  Denies headache, focal weakness or sensory changes   Endocrine:  Denies polyuria or polydipsia   Lymphatic:  Denies swollen glands   Psychiatric:  Denies depression or anxiety     MEDICAL HISTORY    Past Medical History:   Diagnosis Date    Acne     ADHD     Supraclavicular mass         SURGICAL HISTORY    Past Surgical History:   Procedure Laterality Date    CERVICAL LYMPH NODE BIOPSY/EXCISION Right 3/10/2025    Procedure: Right cervical mass/lymph node excisional biopsy;  Surgeon: Cami Bradford MD;  Location: [facility];  Service: General;  Laterality: Right;    NO PAST SURGERIES      VENOUS ACCESS DEVICE (PORT) INSERTION Left 3/25/2025    Procedure: Port-A-Cath/PowerPort placement;  Surgeon: Cami Bradford MD;  Location: [facility];  Service: General;  Laterality: Left;        FAMILY HISTORY    Family History   Problem Relation Age of Onset    No Known Problems Mother     Alcohol abuse Father     Diabetes Father        SOCIAL HISTORY    Social History     Tobacco Use    Smoking status: Never     Passive exposure: Never    Smokeless tobacco: Never   Substance Use Topics    Alcohol use: Yes     Alcohol/week: 2.0 standard drinks of alcohol     Types: 2 Cans of beer per  week        ALLERGIES    No Known Allergies      MEDICATIONS    Current Outpatient Medications on File Prior to Visit   Medication Sig Dispense Refill    allopurinol (Zyloprim) 300 MG tablet Take 1 tablet by mouth Daily. 30 tablet 6    buPROPion XL (WELLBUTRIN XL) 150 MG 24 hr tablet Take 1 tablet by mouth Every Morning. (Patient taking differently: Take 2 tablets by mouth Every Morning.)      cetirizine (zyrTEC) 10 MG tablet Take 1 tablet by mouth Daily.      cholecalciferol (Vitamin D) 25 MCG (1000 UT) tablet       ferrous sulfate 325 (65 FE) MG tablet Take 1 tablet by mouth Daily With Breakfast. 30 tablet 3    melatonin 5 MG tablet tablet Take  by mouth.      Nystatin (magic mouthwash) Swish and spit 5 mL 4 (Four) Times a Day. 41 cc Nystatin  35 cc Benadryl  24 cc Viscous Xylocaine 100 mL 3    OLANZapine (ZyPREXA) 5 MG tablet Take 1 tablet by mouth Every Night. Take 1 tablet at night on Days 1, 2, 3 and 4 and Days 15, 16, 17 and 18. 8 tablet 5    ondansetron (ZOFRAN) 8 MG tablet Take 1 tablet by mouth 3 (Three) Times a Day As Needed for Nausea or Vomiting. 30 tablet 5    Zinc 50 MG tablet Take  by mouth.      [DISCONTINUED] ASHWAGANDHA PO Take 600 mg by mouth. (Patient not taking: Reported on 4/15/2025)      [DISCONTINUED] levoFLOXacin (LEVAQUIN) 500 MG tablet Take 1 tablet by mouth Daily. (Patient not taking: Reported on 5/6/2025) 7 tablet 0    [DISCONTINUED] pyridoxine ([employer] B6) 100 MG tablet Take 1 tablet by mouth Daily.       Current Facility-Administered Medications on File Prior to Visit   Medication Dose Route Frequency Provider Last Rate Last Admin    [COMPLETED] nivolumab (OPDIVO) 240 mg in sodium chloride 0.9 % 124 mL chemo IVPB  240 mg Intravenous Once Graciela Baker APRN   Stopped at 06/24/25 1207    [COMPLETED] Pegfilgrastim-cbqv (UDENYCA ONBODY) on-body injector 6 mg  6 mg Subcutaneous Once Graciela Baker APRN   6 mg at 06/24/25 1211    [COMPLETED] sodium chloride 0.9 % infusion  20  "mL/hr Intravenous Once Samuel Graciela CINDI Fuller   Stopped at 06/24/25 1208    [DISCONTINUED] heparin injection 500 Units  500 Units Intravenous PRN Alethea Mehta MD   500 Units at 06/24/25 1208    [DISCONTINUED] sodium chloride 0.9 % flush 10 mL  10 mL Intravenous PRN Alethea Mehta MD   10 mL at 06/24/25 1208       PHYSICAL EXAM    Vitals:    06/25/25 1058   BP: 118/82   BP Location: Left arm   Patient Position: Sitting   Cuff Size: Adult   Pulse: 79   Resp: 14   SpO2: 96%   Weight: 118 kg (260 lb)   Height: 177.8 cm (70\")        Constitutional:  Well developed, well nourished, no acute distress, non-toxic appearance   Eyes:  PERRL, conjunctiva normal   HENT:  Atraumatic, external ears normal, nose normal, oropharynx moist, no pharyngeal exudates. mallampatti   Neck- normal range of motion, no tenderness, supple   Respiratory:  No respiratory distress, normal breath sounds, no rales, no wheezing   Cardiovascular:  Normal rate, normal rhythm, no murmurs, no gallops, no rubs   GI:  Soft, nondistended, normal bowel sounds, nontender, no organomegaly, no mass, no rebound, no guarding   :  No costovertebral angle tenderness   Musculoskeletal:  No edema, no tenderness, no deformities. Back- no tenderness  Integument:  Well hydrated, no rash   Lymphatic:  No lymphadenopathy noted   Neurologic:  Alert & oriented x 3, CN 2-12 normal, normal motor function, normal sensory function, no focal deficits noted   Psychiatric:  Speech and behavior appropriate     CT Bone marrow biopsy and aspiration  Result Date: 4/8/2025  Successful bone marrow aspiration and biopsy of the left posterior superior iliac spine utilizing CT fluoroscopic guidance. Report dictated by: Mat Marino DNP  I have personally reviewed this case and agree with the findings above: Electronically Signed: Johnny Delong MD  4/8/2025 2:37 PM EDT  Workstation ID: [id number]    NM PET/CT Skull Base to Mid Thigh  Result Date: 4/3/2025  Impression: " 1. Hypermetabolic supraclavicular and mediastinal adenopathy consistent with known lymphoma. 2. Hypermetabolic portacaval, gastrohepatic ligament and several retroperitoneal nodes consistent with lymphoma. 3. Negative for splenomegaly. Electronically Signed: Hugo Xiong MD  4/3/2025 3:19 PM EDT  Workstation ID: [id number]     Results for orders placed during the hospital encounter of 03/25/25    Adult Transthoracic Echo Complete W/ Cont if Necessary Per Protocol    Interpretation Summary    Left ventricular systolic function is normal. Calculated left ventricular EF = 63%    Left ventricular diastolic function was normal.    Estimated right ventricular systolic pressure from tricuspid regurgitation is normal (<35 mmHg).    Transthoracic echocardiography reveals EF of 63%.  No effusion      Electronically signed by Alex Bourgeois MD, 03/25/25, 6:12 PM EDT.      ASSESSMENT & PLAN:      24-year-old male non-smoker no significant past medical history recently diagnosed with Hodgkin lymphoma,   he was referred for pretreatment pulmonary function test evaluation    Pulmonary function test 4/4/2025.  FVC 4.8 L which is 86%  FEV1 4.3 L which is 94%  FEV1/FVC ratio 90  Total lung capacity 83%  Residual volume 70%  Diffusion capacity 80%.     Flow-volume loop within normal range.  Lungs are clear to exam  No significant symptoms of obstructive sleep apnea at present time  Chest x-ray 3/25/2025 no acute findings     Plan:      Continue to monitor respiratory status has been on chemotherapy for the last month he feels weak after treatment, currently lungs are clear to exam we will continue to monitor and repeat PFTs if needed       This document has been electronically signed by  Cisco Houston MD  11:30 EDT

## 2025-07-02 ENCOUNTER — HOSPITAL ENCOUNTER (OUTPATIENT)
Dept: CARDIOLOGY | Facility: HOSPITAL | Age: 25
Discharge: HOME | End: 2025-07-02
Admitting: INTERNAL MEDICINE
Payer: COMMERCIAL

## 2025-07-02 VITALS — BODY MASS INDEX: 37.31 KG/M2 | WEIGHT: 260 LBS | DIASTOLIC BLOOD PRESSURE: 80 MMHG | SYSTOLIC BLOOD PRESSURE: 120 MMHG

## 2025-07-02 DIAGNOSIS — C81.90 HODGKIN LYMPHOMA, UNSPECIFIED HODGKIN LYMPHOMA TYPE, UNSPECIFIED BODY REGION: ICD-10-CM

## 2025-07-02 DIAGNOSIS — Z51.11 ENCOUNTER FOR ANTINEOPLASTIC CHEMOTHERAPY: ICD-10-CM

## 2025-07-02 PROCEDURE — 93356 MYOCRD STRAIN IMG SPCKL TRCK: CPT

## 2025-07-02 PROCEDURE — 93306 TTE W/DOPPLER COMPLETE: CPT

## 2025-07-03 ENCOUNTER — HOSPITAL ENCOUNTER (OUTPATIENT)
Dept: ONCOLOGY | Facility: HOSPITAL | Age: 25
Discharge: HOME | End: 2025-07-03
Admitting: INTERNAL MEDICINE
Payer: COMMERCIAL

## 2025-07-03 ENCOUNTER — OFFICE VISIT (OUTPATIENT)
Dept: ONCOLOGY | Facility: CLINIC | Age: 25
End: 2025-07-03
Payer: COMMERCIAL

## 2025-07-03 VITALS
HEIGHT: 70 IN | OXYGEN SATURATION: 95 % | HEART RATE: 104 BPM | RESPIRATION RATE: 18 BRPM | DIASTOLIC BLOOD PRESSURE: 86 MMHG | TEMPERATURE: 97.5 F | BODY MASS INDEX: 37.8 KG/M2 | WEIGHT: 264 LBS | SYSTOLIC BLOOD PRESSURE: 130 MMHG

## 2025-07-03 DIAGNOSIS — C81.90 HODGKIN LYMPHOMA, UNSPECIFIED HODGKIN LYMPHOMA TYPE, UNSPECIFIED BODY REGION: Primary | ICD-10-CM

## 2025-07-03 DIAGNOSIS — C81.90 HODGKIN LYMPHOMA, UNSPECIFIED HODGKIN LYMPHOMA TYPE, UNSPECIFIED BODY REGION: ICD-10-CM

## 2025-07-03 LAB
ALBUMIN SERPL-MCNC: 4.2 G/DL (ref 3.5–5.2)
ALBUMIN/GLOB SERPL: 1.5 G/DL
ALP SERPL-CCNC: 122 U/L (ref 39–117)
ALT SERPL W P-5'-P-CCNC: 103 U/L (ref 1–41)
ANION GAP SERPL CALCULATED.3IONS-SCNC: 10.5 MMOL/L (ref 5–15)
AORTIC DIMENSIONLESS INDEX: 0.8 (DI)
AST SERPL-CCNC: 38 U/L (ref 1–40)
AV MEAN PRESS GRAD SYS DOP V1V2: 4 MMHG
AV VMAX SYS DOP: 137 CM/SEC
BASOPHILS # BLD AUTO: 0.04 10*3/MM3 (ref 0–0.2)
BASOPHILS NFR BLD AUTO: 0.4 % (ref 0–1.5)
BH CV ECHO LEFT VENTRICLE GLOBAL LONGITUDINAL STRAIN: -22.1 %
BH CV ECHO MEAS - ACS: 2.2 CM
BH CV ECHO MEAS - AO MAX PG: 7.5 MMHG
BH CV ECHO MEAS - AO V2 VTI: 20.5 CM
BH CV ECHO MEAS - AVA(I,D): 3.3 CM2
BH CV ECHO MEAS - EDV(CUBED): 85.2 ML
BH CV ECHO MEAS - EDV(MOD-SP4): 101 ML
BH CV ECHO MEAS - EF(MOD-SP4): 64.8 %
BH CV ECHO MEAS - ESV(CUBED): 24.4 ML
BH CV ECHO MEAS - ESV(MOD-SP4): 35.6 ML
BH CV ECHO MEAS - FS: 34.1 %
BH CV ECHO MEAS - IVS/LVPW: 1.2 CM
BH CV ECHO MEAS - IVSD: 1.2 CM
BH CV ECHO MEAS - LA DIMENSION: 3.2 CM
BH CV ECHO MEAS - LAT PEAK E' VEL: 19.1 CM/SEC
BH CV ECHO MEAS - LV DIASTOLIC VOL/BSA (35-75): 43.3 CM2
BH CV ECHO MEAS - LV MASS(C)D: 168.9 GRAMS
BH CV ECHO MEAS - LV MAX PG: 4.9 MMHG
BH CV ECHO MEAS - LV MEAN PG: 3 MMHG
BH CV ECHO MEAS - LV SYSTOLIC VOL/BSA (12-30): 15.3 CM2
BH CV ECHO MEAS - LV V1 MAX: 111 CM/SEC
BH CV ECHO MEAS - LV V1 VTI: 16.5 CM
BH CV ECHO MEAS - LVIDD: 4.4 CM
BH CV ECHO MEAS - LVIDS: 2.9 CM
BH CV ECHO MEAS - LVOT AREA: 4.2 CM2
BH CV ECHO MEAS - LVOT DIAM: 2.3 CM
BH CV ECHO MEAS - LVPWD: 1 CM
BH CV ECHO MEAS - MED PEAK E' VEL: 7.4 CM/SEC
BH CV ECHO MEAS - MV A MAX VEL: 68.7 CM/SEC
BH CV ECHO MEAS - MV DEC SLOPE: 919 CM/SEC2
BH CV ECHO MEAS - MV DEC TIME: 0.17 SEC
BH CV ECHO MEAS - MV E MAX VEL: 65 CM/SEC
BH CV ECHO MEAS - MV E/A: 0.95
BH CV ECHO MEAS - MV MAX PG: 3.5 MMHG
BH CV ECHO MEAS - MV MEAN PG: 2 MMHG
BH CV ECHO MEAS - MV P1/2T: 29.5 MSEC
BH CV ECHO MEAS - MV V2 VTI: 15.9 CM
BH CV ECHO MEAS - MVA(P1/2T): 7.4 CM2
BH CV ECHO MEAS - MVA(VTI): 4.3 CM2
BH CV ECHO MEAS - PA ACC TIME: 0.07 SEC
BH CV ECHO MEAS - PA V2 MAX: 118 CM/SEC
BH CV ECHO MEAS - RV MAX PG: 4 MMHG
BH CV ECHO MEAS - RV V1 MAX: 99.8 CM/SEC
BH CV ECHO MEAS - RV V1 VTI: 14.8 CM
BH CV ECHO MEAS - SV(LVOT): 68.6 ML
BH CV ECHO MEAS - SV(MOD-SP4): 65.4 ML
BH CV ECHO MEAS - SVI(LVOT): 29.4 ML/M2
BH CV ECHO MEAS - SVI(MOD-SP4): 28 ML/M2
BH CV ECHO MEAS - TAPSE (>1.6): 2.24 CM
BH CV ECHO MEASUREMENTS AVERAGE E/E' RATIO: 4.91
BH CV XLRA - TDI S': 11.7 CM/SEC
BILIRUB SERPL-MCNC: 0.3 MG/DL (ref 0–1.2)
BUN SERPL-MCNC: 14.7 MG/DL (ref 6–20)
BUN/CREAT SERPL: 16.2 (ref 7–25)
CALCIUM SPEC-SCNC: 9.4 MG/DL (ref 8.6–10.5)
CHLORIDE SERPL-SCNC: 105 MMOL/L (ref 98–107)
CO2 SERPL-SCNC: 23.5 MMOL/L (ref 22–29)
CREAT SERPL-MCNC: 0.91 MG/DL (ref 0.76–1.27)
DEPRECATED RDW RBC AUTO: 51.4 FL (ref 37–54)
EGFRCR SERPLBLD CKD-EPI 2021: 118.5 ML/MIN/1.73
EOSINOPHIL # BLD AUTO: 0.09 10*3/MM3 (ref 0–0.4)
EOSINOPHIL NFR BLD AUTO: 1 % (ref 0.3–6.2)
ERYTHROCYTE [DISTWIDTH] IN BLOOD BY AUTOMATED COUNT: 16 % (ref 12.3–15.4)
GLOBULIN UR ELPH-MCNC: 2.8 GM/DL
GLUCOSE SERPL-MCNC: 102 MG/DL (ref 65–99)
HCT VFR BLD AUTO: 38.1 % (ref 37.5–51)
HGB BLD-MCNC: 12.6 G/DL (ref 13–17.7)
LV EF BIPLANE MOD: 65 %
LYMPHOCYTES # BLD AUTO: 2.52 10*3/MM3 (ref 0.7–3.1)
LYMPHOCYTES NFR BLD AUTO: 28.3 % (ref 19.6–45.3)
MCH RBC QN AUTO: 30.2 PG (ref 26.6–33)
MCHC RBC AUTO-ENTMCNC: 33.1 G/DL (ref 31.5–35.7)
MCV RBC AUTO: 91.4 FL (ref 79–97)
MONOCYTES # BLD AUTO: 1.03 10*3/MM3 (ref 0.1–0.9)
MONOCYTES NFR BLD AUTO: 11.6 % (ref 5–12)
NEUTROPHILS NFR BLD AUTO: 5.23 10*3/MM3 (ref 1.7–7)
NEUTROPHILS NFR BLD AUTO: 58.7 % (ref 42.7–76)
PLATELET # BLD AUTO: 280 10*3/MM3 (ref 140–450)
PMV BLD AUTO: 10 FL (ref 6–12)
POTASSIUM SERPL-SCNC: 4 MMOL/L (ref 3.5–5.2)
PROT SERPL-MCNC: 7 G/DL (ref 6–8.5)
RBC # BLD AUTO: 4.17 10*6/MM3 (ref 4.14–5.8)
SINUS: 2.9 CM
SODIUM SERPL-SCNC: 139 MMOL/L (ref 136–145)
STJ: 2.1 CM
WBC NRBC COR # BLD AUTO: 8.91 10*3/MM3 (ref 3.4–10.8)

## 2025-07-03 PROCEDURE — 85025 COMPLETE CBC W/AUTO DIFF WBC: CPT | Performed by: INTERNAL MEDICINE

## 2025-07-03 PROCEDURE — 80053 COMPREHEN METABOLIC PANEL: CPT | Performed by: INTERNAL MEDICINE

## 2025-07-03 RX ORDER — SODIUM CHLORIDE 0.9 % (FLUSH) 0.9 %
10 SYRINGE (ML) INJECTION AS NEEDED
OUTPATIENT
Start: 2025-07-03

## 2025-07-03 RX ORDER — HEPARIN SODIUM (PORCINE) LOCK FLUSH IV SOLN 100 UNIT/ML 100 UNIT/ML
500 SOLUTION INTRAVENOUS AS NEEDED
OUTPATIENT
Start: 2025-07-03

## 2025-07-03 RX ORDER — SODIUM CHLORIDE 0.9 % (FLUSH) 0.9 %
10 SYRINGE (ML) INJECTION AS NEEDED
Status: DISCONTINUED | OUTPATIENT
Start: 2025-07-03 | End: 2025-07-04 | Stop reason: HOSPADM

## 2025-07-03 RX ORDER — HEPARIN SODIUM (PORCINE) LOCK FLUSH IV SOLN 100 UNIT/ML 100 UNIT/ML
500 SOLUTION INTRAVENOUS AS NEEDED
Status: DISCONTINUED | OUTPATIENT
Start: 2025-07-03 | End: 2025-07-04 | Stop reason: HOSPADM

## 2025-07-03 NOTE — PROGRESS NOTES
Hematology/Oncology Outpatient Follow Up    PATIENT NAME:Johnny Miller  :2000  MRN: [id number]  PRIMARY CARE PHYSICIAN: Graciela Cotter APRN  REFERRING PHYSICIAN: Graciela Cotter APRN    Chief Complaint   Patient presents with    Follow-up     Hodgkin lymphoma, unspecified Hodgkin lymphoma type, unspecified body region            HISTORY OF PRESENT ILLNESS:     24-year-old male who has been referred secondary to right supraclavicular mass.  Patient felt a lump on the right neck over the past 4 months.  At the time he noticed it was around the time he had COVID-19 infection in 2024.  He denies night sweats weight loss or fatigue symptoms.  Patient does not smoke.  He drinks only 1 beer per week.  As far as he knows there is no family history of cancer.  States that the mass on the right neck is not painful.     For that reason patient had an ultrasound of the neck completed in 2025 which basically revealed a 3.9 cm well-circumscribed mass right supraclavicular area suspicious characteristics.    For that reason he has been referred to us for further evaluation and management        Patient is alone today for this appointment.    2024: patient had CT scan of the neck and chest with basically revealed right supraclavicular mass measuring approximately 5 x 3 cm enlarged pathologic lymphadenopathy within the mediastinum partially visualized upper retroperitoneal area.  The above findings suggest lymphoproliferative disease  3/19/2025: Patient had additional labs including HIV which was negative LDH was normal, uric acid was normal, sed rate was 27 his white count is 7, hemoglobin 14.4 platelets are 268  4/3/2025: Patient had a PET CT scan which basically revealed hypermetabolic lymphadenopathy in the neck, chest abdomen   2025:: Patient had bone marrow aspiration and biopsy which did not show any evidence of lymphoma involvement.  Patient has hypocellularity of the bone marrow storage  iron is not identified flow cytometry was unremarkable and cytogenetics showed a normal male karyotype  4/15/2025: Patient received cycle 1 of combination chemotherapy with nivolumab AVD  5/6/25 Day 15 Cycle 1 chemotherapy with nivolumab   6/10/2025: Patient received cycle 2-day 15 of combination chemotherapy nivolumab AVD    5/14/25 HPI above reviewed and updated     Past Medical History:   Diagnosis Date    Acne     ADHD     Supraclavicular mass        Past Surgical History:   Procedure Laterality Date    CERVICAL LYMPH NODE BIOPSY/EXCISION Right 3/10/2025    Procedure: Right cervical mass/lymph node excisional biopsy;  Surgeon: Cami Bradford MD;  Location: [facility] MAIN OR;  Service: General;  Laterality: Right;    NO PAST SURGERIES      VENOUS ACCESS DEVICE (PORT) INSERTION Left 3/25/2025    Procedure: Port-A-Cath/PowerPort placement;  Surgeon: Cami Bradford MD;  Location: [facility] MAIN OR;  Service: General;  Laterality: Left;         Current Outpatient Medications:     allopurinol (Zyloprim) 300 MG tablet, Take 1 tablet by mouth Daily., Disp: 30 tablet, Rfl: 6    buPROPion XL (WELLBUTRIN XL) 150 MG 24 hr tablet, Take 1 tablet by mouth Every Morning. (Patient taking differently: Take 2 tablets by mouth Every Morning.), Disp: , Rfl:     cetirizine (zyrTEC) 10 MG tablet, Take 1 tablet by mouth Daily., Disp: , Rfl:     cholecalciferol (Vitamin D) 25 MCG (1000 UT) tablet, , Disp: , Rfl:     ferrous sulfate 325 (65 FE) MG tablet, Take 1 tablet by mouth Daily With Breakfast., Disp: 30 tablet, Rfl: 3    melatonin 5 MG tablet tablet, Take  by mouth., Disp: , Rfl:     Nystatin (magic mouthwash), Swish and spit 5 mL 4 (Four) Times a Day. 41 cc Nystatin 35 cc Benadryl 24 cc Viscous Xylocaine, Disp: 100 mL, Rfl: 3    OLANZapine (ZyPREXA) 5 MG tablet, Take 1 tablet by mouth Every Night. Take 1 tablet at night on Days 1, 2, 3 and 4 and Days 15, 16, 17 and 18., Disp: 8 tablet, Rfl: 5    ondansetron (ZOFRAN) 8 MG tablet, Take 1  tablet by mouth 3 (Three) Times a Day As Needed for Nausea or Vomiting., Disp: 30 tablet, Rfl: 5    Zinc 50 MG tablet, Take  by mouth., Disp: , Rfl:   No current facility-administered medications for this visit.    Facility-Administered Medications Ordered in Other Visits:     heparin injection 500 Units, 500 Units, Intravenous, PRNJanine Ifeoma Roseline, MD    sodium chloride 0.9 % flush 10 mL, 10 mL, Intravenous, PRNJanine Ifeoma Roseline, MD    No Known Allergies    Family History   Problem Relation Age of Onset    No Known Problems Mother     Alcohol abuse Father     Diabetes Father        Cancer-related family history is not on file.    Social History     Tobacco Use    Smoking status: Never     Passive exposure: Never    Smokeless tobacco: Never   Vaping Use    Vaping status: Never Used   Substance Use Topics    Alcohol use: Yes     Alcohol/week: 2.0 standard drinks of alcohol     Types: 2 Cans of beer per week    Drug use: Never       I have reviewed and confirmed the accuracy of the patient's history: Chief complaint, HPI, ROS, and Subjective as entered by the MA/LPN/RN. Alethea Mehta MD 07/03/25       SUBJECTIVE:      5/14/25 Johnny os here for follow up. He is accompanied by his significant other. He reports he is doing ok. He reports some nausea without vomiting and attempting to keep po intake up. He also reports some fatigue the day after treatments .      He is here today for follow-up.  Tolerating chemotherapy has fatigue.  Denies any infections    REVIEW OF SYSTEMS:    Review of Systems   Constitutional:  Positive for fatigue. Negative for chills and fever.   HENT:  Negative for congestion, drooling, ear discharge, rhinorrhea, sinus pressure and tinnitus.    Eyes:  Negative for photophobia, pain and discharge.   Respiratory:  Negative for apnea, choking and stridor.    Cardiovascular:  Negative for palpitations.   Gastrointestinal:  Positive for nausea. Negative for abdominal distention,  "abdominal pain, anal bleeding and vomiting.   Endocrine: Negative for polydipsia and polyphagia.   Genitourinary:  Negative for decreased urine volume, flank pain and genital sores.   Musculoskeletal:  Negative for gait problem, neck pain and neck stiffness.   Skin:  Negative for color change, rash and wound.   Neurological:  Negative for tremors, seizures, syncope, facial asymmetry and speech difficulty.   Hematological:  Negative for adenopathy.   Psychiatric/Behavioral:  Negative for agitation, confusion, hallucinations and self-injury. The patient is not hyperactive.        OBJECTIVE:    Vitals:    07/03/25 1226   BP: 130/86   Pulse: 104   Resp: 18   Temp: 97.5 °F (36.4 °C)   TempSrc: Temporal   SpO2: 95%   Weight: 120 kg (264 lb)   Height: 177.8 cm (70\")   PainSc: 0-No pain       Body mass index is 37.88 kg/m².    ECOG  (0) Fully active, able to carry on all predisease performance without restriction    Physical Exam  Vitals and nursing note reviewed.   Constitutional:       General: He is not in acute distress.     Appearance: Normal appearance. He is not diaphoretic.   HENT:      Head: Normocephalic and atraumatic.      Right Ear: External ear normal.      Left Ear: External ear normal.      Nose: Nose normal.      Mouth/Throat:      Mouth: Mucous membranes are moist.   Eyes:      General: No scleral icterus.        Right eye: No discharge.         Left eye: No discharge.      Conjunctiva/sclera: Conjunctivae normal.   Neck:      Thyroid: No thyromegaly.   Cardiovascular:      Rate and Rhythm: Normal rate and regular rhythm.      Heart sounds: Normal heart sounds.      No friction rub. No gallop.   Pulmonary:      Effort: Pulmonary effort is normal. No respiratory distress.      Breath sounds: Normal breath sounds. No stridor. No wheezing.   Abdominal:      Palpations: Abdomen is soft.      Tenderness: There is no abdominal tenderness.   Musculoskeletal:         General: No tenderness. Normal range of motion. "      Cervical back: Normal range of motion and neck supple.      Right lower leg: No edema.      Left lower leg: No edema.   Lymphadenopathy:      Cervical: No cervical adenopathy.   Skin:     General: Skin is warm.      Findings: No erythema or rash.   Neurological:      Mental Status: He is alert and oriented to person, place, and time.      Motor: No abnormal muscle tone.   Psychiatric:         Mood and Affect: Mood normal.         Behavior: Behavior normal.         Thought Content: Thought content normal.         Judgment: Judgment normal.         Right Supraclavicular zayra enlargement    Left Mediport in place      I have reexamined the patient and the results are consistent with the previously documented exam. Alethea Denisha Mehta MD         RECENT LABS    WBC   Date Value Ref Range Status   07/03/2025 8.91 3.40 - 10.80 10*3/mm3 Final     RBC   Date Value Ref Range Status   07/03/2025 4.17 4.14 - 5.80 10*6/mm3 Final     Hemoglobin   Date Value Ref Range Status   07/03/2025 12.6 (L) 13.0 - 17.7 g/dL Final     Hematocrit   Date Value Ref Range Status   07/03/2025 38.1 37.5 - 51.0 % Final     MCV   Date Value Ref Range Status   07/03/2025 91.4 79.0 - 97.0 fL Final     MCH   Date Value Ref Range Status   07/03/2025 30.2 26.6 - 33.0 pg Final     MCHC   Date Value Ref Range Status   07/03/2025 33.1 31.5 - 35.7 g/dL Final     RDW   Date Value Ref Range Status   07/03/2025 16.0 (H) 12.3 - 15.4 % Final     RDW-SD   Date Value Ref Range Status   07/03/2025 51.4 37.0 - 54.0 fl Final     MPV   Date Value Ref Range Status   07/03/2025 10.0 6.0 - 12.0 fL Final     Platelets   Date Value Ref Range Status   07/03/2025 280 140 - 450 10*3/mm3 Final     Neutrophil %   Date Value Ref Range Status   07/03/2025 58.7 42.7 - 76.0 % Final     Lymphocyte %   Date Value Ref Range Status   07/03/2025 28.3 19.6 - 45.3 % Final     Monocyte %   Date Value Ref Range Status   07/03/2025 11.6 5.0 - 12.0 % Final     Eosinophil %   Date Value  Ref Range Status   07/03/2025 1.0 0.3 - 6.2 % Final     Basophil %   Date Value Ref Range Status   07/03/2025 0.4 0.0 - 1.5 % Final     Immature Grans %   Date Value Ref Range Status   04/08/2025 0.2 0.0 - 0.5 % Final     Neutrophils, Absolute   Date Value Ref Range Status   07/03/2025 5.23 1.70 - 7.00 10*3/mm3 Final     Lymphocytes, Absolute   Date Value Ref Range Status   07/03/2025 2.52 0.70 - 3.10 10*3/mm3 Final     Monocytes, Absolute   Date Value Ref Range Status   07/03/2025 1.03 (H) 0.10 - 0.90 10*3/mm3 Final     Eosinophils, Absolute   Date Value Ref Range Status   07/03/2025 0.09 0.00 - 0.40 10*3/mm3 Final     Basophils, Absolute   Date Value Ref Range Status   07/03/2025 0.04 0.00 - 0.20 10*3/mm3 Final     Immature Grans, Absolute   Date Value Ref Range Status   04/08/2025 0.02 0.00 - 0.05 10*3/mm3 Final     nRBC   Date Value Ref Range Status   04/08/2025 0.0 0.0 - 0.2 /100 WBC Final       Lab Results   Component Value Date    GLUCOSE 102 (H) 07/03/2025    BUN 14.7 07/03/2025    CREATININE 0.91 07/03/2025    EGFRIFNONA 82 02/05/2021    BCR 16.2 07/03/2025    K 4.0 07/03/2025    CO2 23.5 07/03/2025    CALCIUM 9.4 07/03/2025    ALBUMIN 4.2 07/03/2025    AST 38 07/03/2025     (H) 07/03/2025         Assessment & Plan     Hodgkin lymphoma, unspecified Hodgkin lymphoma type, unspecified body region  - CBC & Differential          ASSESSMENT:    Supraclavicular mass  - CBC & Differential        Classical Hodgkin's lymphoma nodular sclerosis status post biopsy of right supraclavicular mass.  No B symptoms.  Clinical stage III disease as patient has lymphadenopathy above and below the diaphragm.  Bone marrow biopsy does not show any evidence of lymphoma. Reviewed  Hypocellular bone marrow: Unknown etiology  Absent iron stores in the bone marrow.  He had a baseline normal hemoglobin prior to chemo  Patient is on combination of nivolumab AVD with plans to treat with 6 total cycles  Neutropenia secondary to  chemotherapy now on Neulasta counts have stabilized  Chemotherapy induced fatigue: stable  Chemotherapy-induced nausea  Fertility preservation discussion.  Patient has opted to proceed with this and has all the information he needs  I recommended combination chemotherapy with modification of his regimen to include nivolumab plus AVD based on recent clinical trial showing improved progression free survivorship at approximately 92% versus 83% compared to BV AVD.  Patient is a Non-smoker       Discussion    Reviewed diagnosis of Hodgkin's lymphoma  Discussed that this is a curable disease  Discussed the need for complete staging  Discussed that there is significant role of chemotherapy  Reviewed  Nivolumab plus AVD regimen in detail    I reviewed the benefits the side effects  Chemotherapy side effects include, but not limited to, nausea, vomiting, bone marrow suppression, which can result in blood, platelet transfusion. There is also risk of permanent bone marrow destruction, which can cause myelodysplastic syndrome or leukemia years down the line. There is risk of infection which can result in hospitalization and even death. There is also risk of fatigue, asthenia, alopecia which could become permanent. Chemo will help to reduce risk of relapse of cancer, but does not eliminate risk completely.     Discussed that we will avoid bleomycin with this regimen.  He Does not have any contraindication to nivolumab  Adriamycin can cause cardiotoxicity     Side effects of nivolumab to include but not limited to    Diarrhea, fatigue, pruritus and rash.  Also included pulmonary toxicity, hepatotoxicity, nephrotoxicity as well as neurotoxicity. There is potential for endocrine and metabolic abnormalities including hyperglycemia, hypertriglyceridemia, hyponatremia, phosphorus abnormalities, hypothyroidism or hyperthyroidism.  There could also be pancytopenia, risk of infection and peripheral neuropathy.  Discussed lab monitoring  that will be needed while on continuous immunotherapy and medicines to help with supportive care.          Plans      Continue with chemotherapy, fu in 3 weeks     PET CT scan after cycle #6 per guidelines form clinical trial and NCCN      Continue with chemotherapy plan continue supportive care    Continue iron supplements 325 mg p.o. daily, continue same   Urinalysis to evaluate for hematuria normal on 4/21/25   Reviewed cbc WBC 4.2 hemoglobin 13.9 platelets 292,000 ANC 2.52   Handicapped parking placard   Continue supportive care  All questions answered           Continue chemotherapy with nivolumab AVD      Give patient information on nivolumab during past appointment     Continue  allopurinol 300 mg p.o. nightly                     History & physical (H&P) including: B symptoms (unexplained fever   >38°C; drenching night sweats; or weight loss >10% of body weight   within 6 mo of diagnosis), alcohol intolerance, pruritus, fatigue,   performance status, and examination of lymphoid regions, spleen,   and liver   Complete blood count (CBC), differential   Erythrocyte sedimentation rate (ESR)   Comprehensive metabolic panel, lactate dehydrogenase (LDH), and   liver function test (LFT)   Human immunodeficiency virus (HIV) testing (See NCCN Guidelines   for Cancer in People with HIV)   Pregnancy test for those of childbearing potential prior to cytotoxic   chemotherapy or radiation therapy (RT)   FDG-PET/CT scan (skull base to mid-thigh or vertex to feet in   selected cases)c,d   Counseling: Fertility/psychosociale and smoking cessation (See   NCCN Guidelines for Smoking Cessation)   Useful in selected cases:   Fertility preservatione,f   Pulmonary function tests ([PFTs] including diffusing capacity of the   lung for carbon monoxide [DLCO])g if ABVDh,i is being used   Hepatitis B/C testing (encouraged)   Diagnostic CTj (contrast-enhanced)    Chest x-ray (encouraged, especially if large mediastinal mass)   Adequate  bone marrow biopsy if there are unexplained cytopenias   other than anemia and negative FDG-PETk   Echocardiogram or multigated acquisition (MUGA) scan and   consideration of atorvastatin1 if anthracycline-based chemotherapy   is indicated   MRI of select sites, with contrast unless contraindicated           Electronically signed by Alethea Mehta MD, 07/03/25, 5:53 PM EDT.

## 2025-07-08 ENCOUNTER — HOSPITAL ENCOUNTER (OUTPATIENT)
Dept: ONCOLOGY | Facility: HOSPITAL | Age: 25
Discharge: HOME | End: 2025-07-08
Admitting: INTERNAL MEDICINE
Payer: COMMERCIAL

## 2025-07-08 VITALS
OXYGEN SATURATION: 96 % | WEIGHT: 264 LBS | DIASTOLIC BLOOD PRESSURE: 84 MMHG | SYSTOLIC BLOOD PRESSURE: 122 MMHG | HEART RATE: 94 BPM | TEMPERATURE: 98.2 F | BODY MASS INDEX: 37.8 KG/M2 | HEIGHT: 70 IN | RESPIRATION RATE: 18 BRPM

## 2025-07-08 DIAGNOSIS — C81.05 NODULAR LYMPHOCYTE PREDOMINANT HODGKIN LYMPHOMA OF LYMPH NODES OF INGUINAL REGION: ICD-10-CM

## 2025-07-08 DIAGNOSIS — C81.90 HODGKIN LYMPHOMA, UNSPECIFIED HODGKIN LYMPHOMA TYPE, UNSPECIFIED BODY REGION: Primary | ICD-10-CM

## 2025-07-08 LAB
ALP BLD-CCNC: 84 U/L (ref 53–128)
BASOPHILS # BLD AUTO: 0.04 10*3/MM3 (ref 0–0.2)
BASOPHILS NFR BLD AUTO: 0.5 % (ref 0–1.5)
BUN BLDA-MCNC: 12 MG/DL (ref 7–22)
CALCIUM BLD QL: 8.9 MG/DL (ref 8–10.3)
CHLORIDE BLDA-SCNC: 112 MMOL/L (ref 98–108)
CO2 BLDA-SCNC: 26 MMOL/L (ref 18–33)
CREAT BLDA-MCNC: 1 MG/DL (ref 0.6–1.2)
DEPRECATED RDW RBC AUTO: 56 FL (ref 37–54)
EGFRCR SERPLBLD CKD-EPI 2021: 105.8 ML/MIN/1.73
EOSINOPHIL # BLD AUTO: 0.09 10*3/MM3 (ref 0–0.4)
EOSINOPHIL NFR BLD AUTO: 1.1 % (ref 0.3–6.2)
ERYTHROCYTE [DISTWIDTH] IN BLOOD BY AUTOMATED COUNT: 17 % (ref 12.3–15.4)
GLUCOSE BLDC GLUCOMTR-MCNC: 109 MG/DL (ref 73–118)
HCT VFR BLD AUTO: 38.5 % (ref 37.5–51)
HGB BLD-MCNC: 12.8 G/DL (ref 13–17.7)
LYMPHOCYTES # BLD AUTO: 2.84 10*3/MM3 (ref 0.7–3.1)
LYMPHOCYTES NFR BLD AUTO: 34.1 % (ref 19.6–45.3)
MCH RBC QN AUTO: 30.9 PG (ref 26.6–33)
MCHC RBC AUTO-ENTMCNC: 33.2 G/DL (ref 31.5–35.7)
MCV RBC AUTO: 93 FL (ref 79–97)
MONOCYTES # BLD AUTO: 0.73 10*3/MM3 (ref 0.1–0.9)
MONOCYTES NFR BLD AUTO: 8.8 % (ref 5–12)
NEUTROPHILS NFR BLD AUTO: 4.64 10*3/MM3 (ref 1.7–7)
NEUTROPHILS NFR BLD AUTO: 55.5 % (ref 42.7–76)
PLATELET # BLD AUTO: 220 10*3/MM3 (ref 140–450)
PMV BLD AUTO: 9.9 FL (ref 6–12)
POC ALBUMIN: 3.7 G/L (ref 3.3–5.5)
POC ALT (SGPT): 96 U/L (ref 10–47)
POC AST (SGOT): 39 U/L (ref 11–38)
POC TOTAL BILIRUBIN: 0.6 MG/DL (ref 0.2–1.6)
POC TOTAL PROTEIN: 6.9 G/DL (ref 6.4–8.1)
POTASSIUM BLDA-SCNC: 4 MMOL/L (ref 3.6–5.1)
RBC # BLD AUTO: 4.14 10*6/MM3 (ref 4.14–5.8)
SODIUM BLD-SCNC: 142 MMOL/L (ref 128–145)
WBC NRBC COR # BLD AUTO: 8.34 10*3/MM3 (ref 3.4–10.8)

## 2025-07-08 PROCEDURE — 96417 CHEMO IV INFUS EACH ADDL SEQ: CPT

## 2025-07-08 PROCEDURE — 25010000002 VINBLASTINE PER 1 MG: Performed by: INTERNAL MEDICINE

## 2025-07-08 PROCEDURE — 96411 CHEMO IV PUSH ADDL DRUG: CPT

## 2025-07-08 PROCEDURE — 25810000003 SODIUM CHLORIDE 0.9 % SOLUTION: Performed by: INTERNAL MEDICINE

## 2025-07-08 PROCEDURE — 25010000002 DACARBAZINE PER 100 MG: Performed by: INTERNAL MEDICINE

## 2025-07-08 PROCEDURE — 96377 APPLICATON ON-BODY INJECTOR: CPT

## 2025-07-08 PROCEDURE — 85025 COMPLETE CBC W/AUTO DIFF WBC: CPT | Performed by: INTERNAL MEDICINE

## 2025-07-08 PROCEDURE — 25810000003 SODIUM CHLORIDE 0.9 % SOLUTION 250 ML FLEX CONT: Performed by: INTERNAL MEDICINE

## 2025-07-08 PROCEDURE — 25010000002 PEGFILGRASTIM-CBQV 6 MG/0.6ML SOLUTION PREFILLED SYRINGE: Performed by: INTERNAL MEDICINE

## 2025-07-08 PROCEDURE — 96413 CHEMO IV INFUSION 1 HR: CPT

## 2025-07-08 PROCEDURE — 25010000002 HEPARIN LOCK FLUSH PER 10 UNITS: Performed by: INTERNAL MEDICINE

## 2025-07-08 PROCEDURE — 25010000002 FOSAPREPITANT PER 1 MG: Performed by: INTERNAL MEDICINE

## 2025-07-08 PROCEDURE — 96367 TX/PROPH/DG ADDL SEQ IV INF: CPT

## 2025-07-08 PROCEDURE — 80053 COMPREHEN METABOLIC PANEL: CPT

## 2025-07-08 PROCEDURE — 25010000002 DOXORUBICIN PER 10 MG: Performed by: INTERNAL MEDICINE

## 2025-07-08 PROCEDURE — 96375 TX/PRO/DX INJ NEW DRUG ADDON: CPT

## 2025-07-08 PROCEDURE — 25010000002 DACARBAZINE PER 200 MG: Performed by: INTERNAL MEDICINE

## 2025-07-08 PROCEDURE — 25010000002 DEXAMETHASONE PER 1 MG: Performed by: INTERNAL MEDICINE

## 2025-07-08 PROCEDURE — 25010000002 PALONOSETRON 0.25 MG/5ML SOLUTION PREFILLED SYRINGE: Performed by: INTERNAL MEDICINE

## 2025-07-08 PROCEDURE — 25010000002 NIVOLUMAB 240 MG/24ML SOLUTION 24 ML VIAL: Performed by: INTERNAL MEDICINE

## 2025-07-08 RX ORDER — SODIUM CHLORIDE 0.9 % (FLUSH) 0.9 %
10 SYRINGE (ML) INJECTION AS NEEDED
OUTPATIENT
Start: 2025-07-08

## 2025-07-08 RX ORDER — DOXORUBICIN HYDROCHLORIDE 2 MG/ML
18.75 INJECTION, SOLUTION INTRAVENOUS ONCE
Status: COMPLETED | OUTPATIENT
Start: 2025-07-08 | End: 2025-07-08

## 2025-07-08 RX ORDER — PALONOSETRON 0.05 MG/ML
0.25 INJECTION, SOLUTION INTRAVENOUS ONCE
Status: COMPLETED | OUTPATIENT
Start: 2025-07-08 | End: 2025-07-08

## 2025-07-08 RX ORDER — HEPARIN SODIUM (PORCINE) LOCK FLUSH IV SOLN 100 UNIT/ML 100 UNIT/ML
500 SOLUTION INTRAVENOUS AS NEEDED
OUTPATIENT
Start: 2025-07-08

## 2025-07-08 RX ORDER — SODIUM CHLORIDE 9 MG/ML
20 INJECTION, SOLUTION INTRAVENOUS ONCE
Status: COMPLETED | OUTPATIENT
Start: 2025-07-08 | End: 2025-07-08

## 2025-07-08 RX ORDER — DEXAMETHASONE SODIUM PHOSPHATE 4 MG/ML
12 INJECTION, SOLUTION INTRA-ARTICULAR; INTRALESIONAL; INTRAMUSCULAR; INTRAVENOUS; SOFT TISSUE ONCE
Status: COMPLETED | OUTPATIENT
Start: 2025-07-08 | End: 2025-07-08

## 2025-07-08 RX ORDER — HEPARIN SODIUM (PORCINE) LOCK FLUSH IV SOLN 100 UNIT/ML 100 UNIT/ML
500 SOLUTION INTRAVENOUS AS NEEDED
Status: DISCONTINUED | OUTPATIENT
Start: 2025-07-08 | End: 2025-07-09 | Stop reason: HOSPADM

## 2025-07-08 RX ORDER — SODIUM CHLORIDE 0.9 % (FLUSH) 0.9 %
10 SYRINGE (ML) INJECTION AS NEEDED
Status: DISCONTINUED | OUTPATIENT
Start: 2025-07-08 | End: 2025-07-09 | Stop reason: HOSPADM

## 2025-07-08 RX ORDER — DOXORUBICIN HYDROCHLORIDE 2 MG/ML
18.75 INJECTION, SOLUTION INTRAVENOUS ONCE
Status: CANCELLED | OUTPATIENT
Start: 2025-07-08

## 2025-07-08 RX ADMIN — Medication 500 UNITS: at 12:56

## 2025-07-08 RX ADMIN — FOSAPREPITANT 100 ML: 150 INJECTION, POWDER, LYOPHILIZED, FOR SOLUTION INTRAVENOUS at 10:19

## 2025-07-08 RX ADMIN — SODIUM CHLORIDE 240 MG: 9 INJECTION, SOLUTION INTRAVENOUS at 12:25

## 2025-07-08 RX ADMIN — PEGFILGRASTIM-CBQV 6 MG: 6 INJECTION, SOLUTION SUBCUTANEOUS at 12:57

## 2025-07-08 RX ADMIN — Medication 10 ML: at 12:56

## 2025-07-08 RX ADMIN — DOXORUBICIN HYDROCHLORIDE 22 MG: 2 INJECTION, SOLUTION INTRAVENOUS at 11:21

## 2025-07-08 RX ADMIN — SODIUM CHLORIDE 20 ML/HR: 9 INJECTION, SOLUTION INTRAVENOUS at 10:13

## 2025-07-08 RX ADMIN — VINBLASTINE SULFATE 7 MG: 1 INJECTION INTRAVENOUS at 11:34

## 2025-07-08 RX ADMIN — DACARBAZINE 890 MG: 10 INJECTION, POWDER, FOR SOLUTION INTRAVENOUS at 11:50

## 2025-07-08 RX ADMIN — PALONOSETRON 0.25 MG: 0.25 INJECTION, SOLUTION INTRAVENOUS at 10:16

## 2025-07-08 RX ADMIN — DEXAMETHASONE SODIUM PHOSPHATE 12 MG: 4 INJECTION, SOLUTION INTRAMUSCULAR; INTRAVENOUS at 10:13

## 2025-07-08 NOTE — PROGRESS NOTES
Patient to infusion room for C3D15 AVD/Opdivo. VSS, no new complaints.  ECHO 7/3./25 61-65%.  AST 96 which does not met parameters. Discussed with Dr Mehta who wanted pharmacy to review to see if any changes need to be made to the dose which they dose reduced doxorubicin and vincristine. Dr Mehta ok to treat AST 96 and signed treatment plan.    Patient tolerated treatment well. Udencyea applied to left arm. Declined AVS and discharged home

## 2025-07-11 ENCOUNTER — TELEPHONE (OUTPATIENT)
Dept: ONCOLOGY | Facility: CLINIC | Age: 25
End: 2025-07-11

## 2025-07-14 RX ORDER — ALLOPURINOL 300 MG/1
300 TABLET ORAL DAILY
Qty: 30 TABLET | Refills: 6 | Status: SHIPPED | OUTPATIENT
Start: 2025-07-14

## 2025-07-15 ENCOUNTER — TELEPHONE (OUTPATIENT)
Dept: ONCOLOGY | Facility: CLINIC | Age: 25
End: 2025-07-15

## 2025-07-18 NOTE — PROGRESS NOTES
Hematology/Oncology Outpatient Follow Up    PATIENT NAME:Johnny Miller  :2000  MRN: [id number]  PRIMARY CARE PHYSICIAN: Graciela Cotter APRN  REFERRING PHYSICIAN: No ref. provider found    Chief Complaint   Patient presents with    Follow-up     Hodgkin lymphoma, unspecified Hodgkin lymphoma type, unspecified body region            HISTORY OF PRESENT ILLNESS:     24-year-old male who has been referred secondary to right supraclavicular mass.  Patient felt a lump on the right neck over the past 4 months.  At the time he noticed it was around the time he had COVID-19 infection in 2024.  He denies night sweats weight loss or fatigue symptoms.  Patient does not smoke.  He drinks only 1 beer per week.  As far as he knows there is no family history of cancer.  States that the mass on the right neck is not painful.     For that reason patient had an ultrasound of the neck completed in 2025 which basically revealed a 3.9 cm well-circumscribed mass right supraclavicular area suspicious characteristics.    For that reason he has been referred to us for further evaluation and management        Patient is alone today for this appointment.    2024: patient had CT scan of the neck and chest with basically revealed right supraclavicular mass measuring approximately 5 x 3 cm enlarged pathologic lymphadenopathy within the mediastinum partially visualized upper retroperitoneal area.  The above findings suggest lymphoproliferative disease  3/19/2025: Patient had additional labs including HIV which was negative LDH was normal, uric acid was normal, sed rate was 27 his white count is 7, hemoglobin 14.4 platelets are 268  4/3/2025: Patient had a PET CT scan which basically revealed hypermetabolic lymphadenopathy in the neck, chest abdomen   2025:: Patient had bone marrow aspiration and biopsy which did not show any evidence of lymphoma involvement.  Patient has hypocellularity of the bone marrow storage  iron is not identified flow cytometry was unremarkable and cytogenetics showed a normal male karyotype  4/15/2025: Patient received cycle 1 of combination chemotherapy with nivolumab AVD  5/6/25 Day 15 Cycle 1 chemotherapy with nivolumab   6/10/2025: Patient received cycle 2-day 15 of combination chemotherapy nivolumab AVD  7/22/2025: Patient to receive cycle 4-day 1 today combination chemotherapy with nivolumab AVD    5/14/25 HPI above reviewed and updated     Past Medical History:   Diagnosis Date    Acne     ADHD     Supraclavicular mass        Past Surgical History:   Procedure Laterality Date    CERVICAL LYMPH NODE BIOPSY/EXCISION Right 3/10/2025    Procedure: Right cervical mass/lymph node excisional biopsy;  Surgeon: Cami Bradford MD;  Location: [facility] MAIN OR;  Service: General;  Laterality: Right;    NO PAST SURGERIES      VENOUS ACCESS DEVICE (PORT) INSERTION Left 3/25/2025    Procedure: Port-A-Cath/PowerPort placement;  Surgeon: Cami Bradford MD;  Location: [facility] MAIN OR;  Service: General;  Laterality: Left;         Current Outpatient Medications:     allopurinol (Zyloprim) 300 MG tablet, Take 1 tablet by mouth Daily., Disp: 30 tablet, Rfl: 6    buPROPion XL (WELLBUTRIN XL) 150 MG 24 hr tablet, Take 1 tablet by mouth Every Morning. (Patient taking differently: Take 2 tablets by mouth Every Morning.), Disp: , Rfl:     cetirizine (zyrTEC) 10 MG tablet, Take 1 tablet by mouth Daily., Disp: , Rfl:     cholecalciferol (Vitamin D) 25 MCG (1000 UT) tablet, , Disp: , Rfl:     ferrous sulfate 325 (65 FE) MG tablet, Take 1 tablet by mouth Daily With Breakfast., Disp: 30 tablet, Rfl: 3    melatonin 5 MG tablet tablet, Take  by mouth., Disp: , Rfl:     Nystatin (magic mouthwash), Swish and spit 5 mL 4 (Four) Times a Day. 41 cc Nystatin 35 cc Benadryl 24 cc Viscous Xylocaine, Disp: 100 mL, Rfl: 3    OLANZapine (ZyPREXA) 5 MG tablet, Take 1 tablet by mouth Every Night. Take 1 tablet at night on Days 1, 2, 3 and 4 and  Days 15, 16, 17 and 18., Disp: 8 tablet, Rfl: 5    ondansetron (ZOFRAN) 8 MG tablet, Take 1 tablet by mouth 3 (Three) Times a Day As Needed for Nausea or Vomiting., Disp: 30 tablet, Rfl: 5    Zinc 50 MG tablet, Take  by mouth., Disp: , Rfl:     No Known Allergies    Family History   Problem Relation Age of Onset    No Known Problems Mother     Alcohol abuse Father     Diabetes Father        Cancer-related family history is not on file.    Social History     Tobacco Use    Smoking status: Never     Passive exposure: Never    Smokeless tobacco: Never   Vaping Use    Vaping status: Never Used   Substance Use Topics    Alcohol use: Yes     Alcohol/week: 2.0 standard drinks of alcohol     Types: 2 Cans of beer per week    Drug use: Never       I have reviewed and confirmed the accuracy of the patient's history: Chief complaint, HPI, ROS, and Subjective as entered by the MA/LPN/RN. Alethea Mehta MD 07/22/25       SUBJECTIVE:    Patient is here today for follow-up. 7/22/25    Complaints of dyspepsia and also decrease in the size of his testicles.  He is wondering about his testosterone levels.      REVIEW OF SYSTEMS:    Review of Systems   Constitutional:  Positive for fatigue. Negative for chills and fever.   HENT:  Negative for congestion, drooling, ear discharge, rhinorrhea, sinus pressure and tinnitus.    Eyes:  Negative for photophobia, pain and discharge.   Respiratory:  Negative for apnea, choking and stridor.    Cardiovascular:  Negative for palpitations.   Gastrointestinal:  Positive for nausea. Negative for abdominal distention, abdominal pain, anal bleeding and vomiting.   Endocrine: Negative for polydipsia and polyphagia.   Genitourinary:  Negative for decreased urine volume, flank pain and genital sores.   Musculoskeletal:  Negative for gait problem, neck pain and neck stiffness.   Skin:  Negative for color change, rash and wound.   Neurological:  Negative for tremors, seizures, syncope, facial  "asymmetry and speech difficulty.   Hematological:  Negative for adenopathy.   Psychiatric/Behavioral:  Negative for agitation, confusion, hallucinations and self-injury. The patient is not hyperactive.        OBJECTIVE:    Vitals:    07/22/25 0829   BP: 122/81   Pulse: 82   Resp: 20   Temp: 97.5 °F (36.4 °C)   TempSrc: Temporal   SpO2: 97%   Weight: 120 kg (264 lb 12.8 oz)   Height: 177.8 cm (70\")   PainSc: 0-No pain         Body mass index is 37.99 kg/m².    ECOG  (0) Fully active, able to carry on all predisease performance without restriction    Physical Exam  Vitals and nursing note reviewed.   Constitutional:       General: He is not in acute distress.     Appearance: Normal appearance. He is not diaphoretic.   HENT:      Head: Normocephalic and atraumatic.      Right Ear: External ear normal.      Left Ear: External ear normal.      Nose: Nose normal.      Mouth/Throat:      Mouth: Mucous membranes are moist.   Eyes:      General: No scleral icterus.        Right eye: No discharge.         Left eye: No discharge.      Conjunctiva/sclera: Conjunctivae normal.   Neck:      Thyroid: No thyromegaly.   Cardiovascular:      Rate and Rhythm: Normal rate and regular rhythm.      Heart sounds: Normal heart sounds.      No friction rub. No gallop.   Pulmonary:      Effort: Pulmonary effort is normal. No respiratory distress.      Breath sounds: Normal breath sounds. No stridor. No wheezing.   Abdominal:      Palpations: Abdomen is soft.      Tenderness: There is no abdominal tenderness.   Musculoskeletal:         General: No tenderness. Normal range of motion.      Cervical back: Normal range of motion and neck supple.      Right lower leg: No edema.      Left lower leg: No edema.   Lymphadenopathy:      Cervical: No cervical adenopathy.   Skin:     General: Skin is warm.      Findings: No erythema or rash.   Neurological:      Mental Status: He is alert and oriented to person, place, and time.      Motor: No abnormal " muscle tone.   Psychiatric:         Mood and Affect: Mood normal.         Behavior: Behavior normal.         Thought Content: Thought content normal.         Judgment: Judgment normal.         Right Supraclavicular zayra enlargement    Left Mediport in place    I have reexamined the patient and the results are consistent with the previously documented exam. Alethea Mehta MD       RECENT LABS    WBC   Date Value Ref Range Status   07/22/2025 7.41 3.40 - 10.80 10*3/mm3 Final     RBC   Date Value Ref Range Status   07/22/2025 4.01 (L) 4.14 - 5.80 10*6/mm3 Final     Hemoglobin   Date Value Ref Range Status   07/22/2025 12.6 (L) 13.0 - 17.7 g/dL Final     Hematocrit   Date Value Ref Range Status   07/22/2025 37.5 37.5 - 51.0 % Final     MCV   Date Value Ref Range Status   07/22/2025 93.5 79.0 - 97.0 fL Final     MCH   Date Value Ref Range Status   07/22/2025 31.4 26.6 - 33.0 pg Final     MCHC   Date Value Ref Range Status   07/22/2025 33.6 31.5 - 35.7 g/dL Final     RDW   Date Value Ref Range Status   07/22/2025 16.6 (H) 12.3 - 15.4 % Final     RDW-SD   Date Value Ref Range Status   07/22/2025 58.1 (H) 37.0 - 54.0 fl Final     MPV   Date Value Ref Range Status   07/22/2025 10.4 6.0 - 12.0 fL Final     Platelets   Date Value Ref Range Status   07/22/2025 215 140 - 450 10*3/mm3 Final     Neutrophil %   Date Value Ref Range Status   07/22/2025 54.0 42.7 - 76.0 % Final     Lymphocyte %   Date Value Ref Range Status   07/22/2025 31.6 19.6 - 45.3 % Final     Monocyte %   Date Value Ref Range Status   07/22/2025 11.9 5.0 - 12.0 % Final     Eosinophil %   Date Value Ref Range Status   07/22/2025 1.8 0.3 - 6.2 % Final     Basophil %   Date Value Ref Range Status   07/22/2025 0.4 0.0 - 1.5 % Final     Immature Grans %   Date Value Ref Range Status   07/22/2025 0.3 0.0 - 0.5 % Final     Neutrophils, Absolute   Date Value Ref Range Status   07/22/2025 4.01 1.70 - 7.00 10*3/mm3 Final     Lymphocytes, Absolute   Date Value  Ref Range Status   07/22/2025 2.34 0.70 - 3.10 10*3/mm3 Final     Monocytes, Absolute   Date Value Ref Range Status   07/22/2025 0.88 0.10 - 0.90 10*3/mm3 Final     Eosinophils, Absolute   Date Value Ref Range Status   07/22/2025 0.13 0.00 - 0.40 10*3/mm3 Final     Basophils, Absolute   Date Value Ref Range Status   07/22/2025 0.03 0.00 - 0.20 10*3/mm3 Final     Immature Grans, Absolute   Date Value Ref Range Status   07/22/2025 0.02 0.00 - 0.05 10*3/mm3 Final     nRBC   Date Value Ref Range Status   04/08/2025 0.0 0.0 - 0.2 /100 WBC Final       Lab Results   Component Value Date    GLUCOSE 102 (H) 07/03/2025    BUN 14.7 07/03/2025    CREATININE 1.00 07/22/2025    EGFRIFNONA 82 02/05/2021    BCR 16.2 07/03/2025    K 4.0 07/03/2025    CO2 23.5 07/03/2025    CALCIUM 9.4 07/03/2025    ALBUMIN 4.2 07/03/2025    AST 38 07/03/2025     (H) 07/03/2025         Assessment & Plan     There are no diagnoses linked to this encounter.          ASSESSMENT:    Supraclavicular mass  - CBC & Differential        Classical Hodgkin's lymphoma nodular sclerosis status post biopsy of right supraclavicular mass.  No B symptoms.  Clinical stage III disease as patient has lymphadenopathy above and below the diaphragm.  Bone marrow biopsy does not show any evidence of lymphoma. Reviewed  Hypocellular bone marrow: Unknown etiology  Absent iron stores in the bone marrow.  He had a baseline normal hemoglobin prior to chemo  Patient is on combination of nivolumab AVD with plans to treat with 6 total cycles  Neutropenia secondary to chemotherapy now on Neulasta counts have stabilized CBC reviewed  Chemotherapy induced fatigue: This has remained stable  Chemotherapy-induced nausea: This is controlled  Fertility preservation discussion.  Patient has opted to proceed with this and has all the information he needs  Possible hypogonadism: Refer to [facility] to help manage this  I recommended combination chemotherapy with modification of his  regimen to include nivolumab plus AVD based on recent clinical trial showing improved progression free survivorship at approximately 92% versus 83% compared to BV AVD.  Patient is a Non-smoker       Discussion    Reviewed diagnosis of Hodgkin's lymphoma  Discussed that this is a curable disease  Discussed the need for complete staging  Discussed that there is significant role of chemotherapy  Reviewed  Nivolumab plus AVD regimen in detail    I reviewed the benefits the side effects  Chemotherapy side effects include, but not limited to, nausea, vomiting, bone marrow suppression, which can result in blood, platelet transfusion. There is also risk of permanent bone marrow destruction, which can cause myelodysplastic syndrome or leukemia years down the line. There is risk of infection which can result in hospitalization and even death. There is also risk of fatigue, asthenia, alopecia which could become permanent. Chemo will help to reduce risk of relapse of cancer, but does not eliminate risk completely.     Discussed that we will avoid bleomycin with this regimen.  He Does not have any contraindication to nivolumab  Adriamycin can cause cardiotoxicity     Side effects of nivolumab to include but not limited to    Diarrhea, fatigue, pruritus and rash.  Also included pulmonary toxicity, hepatotoxicity, nephrotoxicity as well as neurotoxicity. There is potential for endocrine and metabolic abnormalities including hyperglycemia, hypertriglyceridemia, hyponatremia, phosphorus abnormalities, hypothyroidism or hyperthyroidism.  There could also be pancytopenia, risk of infection and peripheral neuropathy.  Discussed lab monitoring that will be needed while on continuous immunotherapy and medicines to help with supportive care.          Plans      Continue with chemotherapy    Prilosec 40 mg p.o. daily    Refer to urologist for possible hypogonadism      Follow-up 3 weeks        History & physical (H&P) including: B symptoms  (unexplained fever   >38°C; drenching night sweats; or weight loss >10% of body weight   within 6 mo of diagnosis), alcohol intolerance, pruritus, fatigue,   performance status, and examination of lymphoid regions, spleen,   and liver   Complete blood count (CBC), differential   Erythrocyte sedimentation rate (ESR)   Comprehensive metabolic panel, lactate dehydrogenase (LDH), and   liver function test (LFT)   Human immunodeficiency virus (HIV) testing (See NCCN Guidelines   for Cancer in People with HIV)   Pregnancy test for those of childbearing potential prior to cytotoxic   chemotherapy or radiation therapy (RT)   FDG-PET/CT scan (skull base to mid-thigh or vertex to feet in   selected cases)c,d   Counseling: Fertility/psychosociale and smoking cessation (See   NCCN Guidelines for Smoking Cessation)   Useful in selected cases:   Fertility preservatione,f   Pulmonary function tests ([PFTs] including diffusing capacity of the   lung for carbon monoxide [DLCO])g if ABVDh,i is being used   Hepatitis B/C testing (encouraged)   Diagnostic CTj (contrast-enhanced)    Chest x-ray (encouraged, especially if large mediastinal mass)   Adequate bone marrow biopsy if there are unexplained cytopenias   other than anemia and negative FDG-PETk   Echocardiogram or multigated acquisition (MUGA) scan and   consideration of atorvastatin1 if anthracycline-based chemotherapy   is indicated   MRI of select sites, with contrast unless contraindicated           Electronically signed by Alethea Mehta MD, 07/22/25, 10:08 AM EDT.

## 2025-07-21 DIAGNOSIS — C81.90 HODGKIN LYMPHOMA, UNSPECIFIED HODGKIN LYMPHOMA TYPE, UNSPECIFIED BODY REGION: ICD-10-CM

## 2025-07-21 RX ORDER — OLANZAPINE 5 MG/1
5 TABLET, FILM COATED ORAL NIGHTLY
Qty: 8 TABLET | Refills: 5 | Status: SHIPPED | OUTPATIENT
Start: 2025-07-21

## 2025-07-21 RX ORDER — FERROUS SULFATE 325(65) MG
325 TABLET ORAL
Qty: 30 TABLET | Refills: 3 | Status: SHIPPED | OUTPATIENT
Start: 2025-07-21

## 2025-07-22 ENCOUNTER — APPOINTMENT (OUTPATIENT)
Dept: ONCOLOGY | Facility: HOSPITAL | Age: 25
End: 2025-07-22
Payer: COMMERCIAL

## 2025-07-22 ENCOUNTER — HOSPITAL ENCOUNTER (OUTPATIENT)
Dept: ONCOLOGY | Facility: HOSPITAL | Age: 25
Discharge: HOME | End: 2025-07-22
Payer: COMMERCIAL

## 2025-07-22 ENCOUNTER — OFFICE VISIT (OUTPATIENT)
Dept: ONCOLOGY | Facility: CLINIC | Age: 25
End: 2025-07-22
Payer: COMMERCIAL

## 2025-07-22 VITALS
DIASTOLIC BLOOD PRESSURE: 81 MMHG | OXYGEN SATURATION: 97 % | SYSTOLIC BLOOD PRESSURE: 122 MMHG | BODY MASS INDEX: 37.91 KG/M2 | RESPIRATION RATE: 20 BRPM | HEART RATE: 82 BPM | WEIGHT: 264.8 LBS | HEIGHT: 70 IN | TEMPERATURE: 97.5 F

## 2025-07-22 DIAGNOSIS — C81.90 HODGKIN LYMPHOMA, UNSPECIFIED HODGKIN LYMPHOMA TYPE, UNSPECIFIED BODY REGION: Primary | ICD-10-CM

## 2025-07-22 DIAGNOSIS — E29.1 HYPOGONADISM IN MALE: Primary | ICD-10-CM

## 2025-07-22 LAB
ALP BLD-CCNC: 90 U/L (ref 53–128)
BASOPHILS # BLD AUTO: 0.03 10*3/MM3 (ref 0–0.2)
BASOPHILS NFR BLD AUTO: 0.4 % (ref 0–1.5)
BUN BLDA-MCNC: 12 MG/DL (ref 7–22)
CALCIUM BLD QL: 8.9 MG/DL (ref 8–10.3)
CHLORIDE BLDA-SCNC: 110 MMOL/L (ref 98–108)
CO2 BLDA-SCNC: 28 MMOL/L (ref 18–33)
CREAT BLDA-MCNC: 1 MG/DL (ref 0.6–1.2)
DEPRECATED RDW RBC AUTO: 58.1 FL (ref 37–54)
EGFRCR SERPLBLD CKD-EPI 2021: 105.8 ML/MIN/1.73
EOSINOPHIL # BLD AUTO: 0.13 10*3/MM3 (ref 0–0.4)
EOSINOPHIL NFR BLD AUTO: 1.8 % (ref 0.3–6.2)
ERYTHROCYTE [DISTWIDTH] IN BLOOD BY AUTOMATED COUNT: 16.6 % (ref 12.3–15.4)
GLUCOSE BLDC GLUCOMTR-MCNC: 96 MG/DL (ref 73–118)
HCT VFR BLD AUTO: 37.5 % (ref 37.5–51)
HGB BLD-MCNC: 12.6 G/DL (ref 13–17.7)
IMM GRANULOCYTES # BLD AUTO: 0.02 10*3/MM3 (ref 0–0.05)
IMM GRANULOCYTES NFR BLD AUTO: 0.3 % (ref 0–0.5)
LYMPHOCYTES # BLD AUTO: 2.34 10*3/MM3 (ref 0.7–3.1)
LYMPHOCYTES NFR BLD AUTO: 31.6 % (ref 19.6–45.3)
MCH RBC QN AUTO: 31.4 PG (ref 26.6–33)
MCHC RBC AUTO-ENTMCNC: 33.6 G/DL (ref 31.5–35.7)
MCV RBC AUTO: 93.5 FL (ref 79–97)
MONOCYTES # BLD AUTO: 0.88 10*3/MM3 (ref 0.1–0.9)
MONOCYTES NFR BLD AUTO: 11.9 % (ref 5–12)
NEUTROPHILS NFR BLD AUTO: 4.01 10*3/MM3 (ref 1.7–7)
NEUTROPHILS NFR BLD AUTO: 54 % (ref 42.7–76)
PLATELET # BLD AUTO: 215 10*3/MM3 (ref 140–450)
PMV BLD AUTO: 10.4 FL (ref 6–12)
POC ALBUMIN: 3.6 G/L (ref 3.3–5.5)
POC ALT (SGPT): 61 U/L (ref 10–47)
POC AST (SGOT): 34 U/L (ref 11–38)
POC TOTAL BILIRUBIN: 0.5 MG/DL (ref 0.2–1.6)
POC TOTAL PROTEIN: 6.7 G/DL (ref 6.4–8.1)
POTASSIUM BLDA-SCNC: 4.5 MMOL/L (ref 3.6–5.1)
RBC # BLD AUTO: 4.01 10*6/MM3 (ref 4.14–5.8)
SODIUM BLD-SCNC: 139 MMOL/L (ref 128–145)
T4 SERPL-MCNC: 6.08 MCG/DL (ref 4.5–11.7)
TSH SERPL DL<=0.05 MIU/L-ACNC: 3.54 UIU/ML (ref 0.27–4.2)
WBC NRBC COR # BLD AUTO: 7.41 10*3/MM3 (ref 3.4–10.8)

## 2025-07-22 PROCEDURE — 80053 COMPREHEN METABOLIC PANEL: CPT

## 2025-07-22 PROCEDURE — 25010000002 VINBLASTINE PER 1 MG: Performed by: INTERNAL MEDICINE

## 2025-07-22 PROCEDURE — 96375 TX/PRO/DX INJ NEW DRUG ADDON: CPT

## 2025-07-22 PROCEDURE — 25010000002 DOXORUBICIN PER 10 MG: Performed by: INTERNAL MEDICINE

## 2025-07-22 PROCEDURE — 99214 OFFICE O/P EST MOD 30 MIN: CPT | Performed by: INTERNAL MEDICINE

## 2025-07-22 PROCEDURE — 96411 CHEMO IV PUSH ADDL DRUG: CPT

## 2025-07-22 PROCEDURE — 25010000002 HEPARIN LOCK FLUSH PER 10 UNITS: Performed by: INTERNAL MEDICINE

## 2025-07-22 PROCEDURE — 25010000002 DACARBAZINE PER 200 MG: Performed by: INTERNAL MEDICINE

## 2025-07-22 PROCEDURE — 25010000002 DEXAMETHASONE PER 1 MG: Performed by: INTERNAL MEDICINE

## 2025-07-22 PROCEDURE — 25810000003 SODIUM CHLORIDE 0.9 % SOLUTION: Performed by: INTERNAL MEDICINE

## 2025-07-22 PROCEDURE — 96413 CHEMO IV INFUSION 1 HR: CPT

## 2025-07-22 PROCEDURE — 96417 CHEMO IV INFUS EACH ADDL SEQ: CPT

## 2025-07-22 PROCEDURE — 85025 COMPLETE CBC W/AUTO DIFF WBC: CPT | Performed by: INTERNAL MEDICINE

## 2025-07-22 PROCEDURE — 96367 TX/PROPH/DG ADDL SEQ IV INF: CPT

## 2025-07-22 PROCEDURE — 84443 ASSAY THYROID STIM HORMONE: CPT | Performed by: INTERNAL MEDICINE

## 2025-07-22 PROCEDURE — 25010000002 PEGFILGRASTIM-CBQV 6 MG/0.6ML SOLUTION PREFILLED SYRINGE: Performed by: INTERNAL MEDICINE

## 2025-07-22 PROCEDURE — 25810000003 SODIUM CHLORIDE 0.9 % SOLUTION 250 ML FLEX CONT: Performed by: INTERNAL MEDICINE

## 2025-07-22 PROCEDURE — 96377 APPLICATON ON-BODY INJECTOR: CPT

## 2025-07-22 PROCEDURE — 25010000002 DACARBAZINE PER 100 MG: Performed by: INTERNAL MEDICINE

## 2025-07-22 PROCEDURE — 84436 ASSAY OF TOTAL THYROXINE: CPT | Performed by: INTERNAL MEDICINE

## 2025-07-22 PROCEDURE — 25010000002 FOSAPREPITANT PER 1 MG: Performed by: INTERNAL MEDICINE

## 2025-07-22 PROCEDURE — 25010000002 NIVOLUMAB 240 MG/24ML SOLUTION 24 ML VIAL: Performed by: INTERNAL MEDICINE

## 2025-07-22 PROCEDURE — 25010000002 PALONOSETRON 0.25 MG/5ML SOLUTION PREFILLED SYRINGE: Performed by: INTERNAL MEDICINE

## 2025-07-22 RX ORDER — SODIUM CHLORIDE 9 MG/ML
20 INJECTION, SOLUTION INTRAVENOUS ONCE
Status: COMPLETED | OUTPATIENT
Start: 2025-07-22 | End: 2025-07-22

## 2025-07-22 RX ORDER — HEPARIN SODIUM (PORCINE) LOCK FLUSH IV SOLN 100 UNIT/ML 100 UNIT/ML
500 SOLUTION INTRAVENOUS AS NEEDED
OUTPATIENT
Start: 2025-07-22

## 2025-07-22 RX ORDER — SODIUM CHLORIDE 9 MG/ML
1000 INJECTION, SOLUTION INTRAVENOUS CONTINUOUS
Status: ACTIVE | OUTPATIENT
Start: 2025-07-22 | End: 2025-07-22

## 2025-07-22 RX ORDER — OMEPRAZOLE 40 MG/1
40 CAPSULE, DELAYED RELEASE ORAL DAILY
Qty: 30 CAPSULE | Refills: 4 | Status: SHIPPED | OUTPATIENT
Start: 2025-07-22

## 2025-07-22 RX ORDER — PALONOSETRON 0.05 MG/ML
0.25 INJECTION, SOLUTION INTRAVENOUS ONCE
Status: COMPLETED | OUTPATIENT
Start: 2025-07-22 | End: 2025-07-22

## 2025-07-22 RX ORDER — HEPARIN SODIUM (PORCINE) LOCK FLUSH IV SOLN 100 UNIT/ML 100 UNIT/ML
500 SOLUTION INTRAVENOUS AS NEEDED
Status: DISCONTINUED | OUTPATIENT
Start: 2025-07-22 | End: 2025-07-23 | Stop reason: HOSPADM

## 2025-07-22 RX ORDER — DOXORUBICIN HYDROCHLORIDE 2 MG/ML
25 INJECTION, SOLUTION INTRAVENOUS ONCE
Status: CANCELLED | OUTPATIENT
Start: 2025-07-22

## 2025-07-22 RX ORDER — SODIUM CHLORIDE 9 MG/ML
20 INJECTION, SOLUTION INTRAVENOUS ONCE
Status: CANCELLED | OUTPATIENT
Start: 2025-07-22

## 2025-07-22 RX ORDER — PALONOSETRON 0.05 MG/ML
0.25 INJECTION, SOLUTION INTRAVENOUS ONCE
Status: CANCELLED | OUTPATIENT
Start: 2025-07-22

## 2025-07-22 RX ORDER — DOXORUBICIN HYDROCHLORIDE 2 MG/ML
25 INJECTION, SOLUTION INTRAVENOUS ONCE
Status: COMPLETED | OUTPATIENT
Start: 2025-07-22 | End: 2025-07-22

## 2025-07-22 RX ORDER — DEXAMETHASONE SODIUM PHOSPHATE 4 MG/ML
12 INJECTION, SOLUTION INTRA-ARTICULAR; INTRALESIONAL; INTRAMUSCULAR; INTRAVENOUS; SOFT TISSUE ONCE
Status: COMPLETED | OUTPATIENT
Start: 2025-07-22 | End: 2025-07-22

## 2025-07-22 RX ORDER — SODIUM CHLORIDE 0.9 % (FLUSH) 0.9 %
10 SYRINGE (ML) INJECTION AS NEEDED
Status: DISCONTINUED | OUTPATIENT
Start: 2025-07-22 | End: 2025-07-23 | Stop reason: HOSPADM

## 2025-07-22 RX ORDER — SODIUM CHLORIDE 0.9 % (FLUSH) 0.9 %
10 SYRINGE (ML) INJECTION AS NEEDED
OUTPATIENT
Start: 2025-07-22

## 2025-07-22 RX ADMIN — HEPARIN 500 UNITS: 100 SYRINGE at 12:56

## 2025-07-22 RX ADMIN — SODIUM CHLORIDE 20 ML/HR: 9 INJECTION, SOLUTION INTRAVENOUS at 09:59

## 2025-07-22 RX ADMIN — SODIUM CHLORIDE 240 MG: 9 INJECTION, SOLUTION INTRAVENOUS at 12:23

## 2025-07-22 RX ADMIN — PEGFILGRASTIM-CBQV 6 MG: 6 INJECTION, SOLUTION SUBCUTANEOUS at 12:45

## 2025-07-22 RX ADMIN — DACARBAZINE 890 MG: 10 INJECTION, POWDER, FOR SOLUTION INTRAVENOUS at 11:44

## 2025-07-22 RX ADMIN — PALONOSETRON 0.25 MG: 0.25 INJECTION, SOLUTION INTRAVENOUS at 10:05

## 2025-07-22 RX ADMIN — DEXAMETHASONE SODIUM PHOSPHATE 12 MG: 4 INJECTION INTRA-ARTICULAR; INTRALESIONAL; INTRAMUSCULAR; INTRAVENOUS; SOFT TISSUE at 10:08

## 2025-07-22 RX ADMIN — FOSAPREPITANT 100 ML: 150 INJECTION, POWDER, LYOPHILIZED, FOR SOLUTION INTRAVENOUS at 10:13

## 2025-07-22 RX ADMIN — DOXORUBICIN HYDROCHLORIDE 30 MG: 2 INJECTION, SOLUTION INTRAVENOUS at 11:03

## 2025-07-22 RX ADMIN — SODIUM CHLORIDE 1000 ML/HR: 9 INJECTION, SOLUTION INTRAVENOUS at 10:00

## 2025-07-22 RX ADMIN — Medication 10 ML: at 12:56

## 2025-07-22 RX ADMIN — VINBLASTINE SULFATE 14 MG: 1 INJECTION INTRAVENOUS at 11:27

## 2025-07-22 NOTE — PROGRESS NOTES
Pt here for C4D1 following scheduled visit with Dr Mehta.  Infusaport already accessed with blood return noted. Pt requesting additional IVF,  reviewed with Dr Mehta and pt to receive NS 1 L IV over one hour.  Reviewed with pt and v/u and agreement.  He reports he has been getting nauseous with receiving Aloxi since his 3 or 4 infusion and that he has vomited once while receiving,  he vomited post aloxi today.   I asked if he has discussed with Dr Mehta and he doesn't think he has,  he feels may be in his mind building up that he is going to get nauseous with his aloxi.   He takes olanzapine as prescribed,  inbasket sent to clinical pool for future infusions.

## 2025-07-22 NOTE — PATIENT INSTRUCTIONS
Remove Udenyca onbody on 7/23/25 at 5 pm    Take Olanzapine (zyprexa) for 4 nights beginning this evening

## 2025-07-23 RX ORDER — LORAZEPAM 0.5 MG/1
0.5 TABLET ORAL EVERY 8 HOURS PRN
Qty: 30 TABLET | Refills: 0 | Status: SHIPPED | OUTPATIENT
Start: 2025-07-23

## 2025-08-05 ENCOUNTER — HOSPITAL ENCOUNTER (OUTPATIENT)
Dept: ONCOLOGY | Facility: HOSPITAL | Age: 25
Discharge: HOME | End: 2025-08-05
Admitting: INTERNAL MEDICINE
Payer: COMMERCIAL

## 2025-08-05 VITALS
HEIGHT: 70 IN | SYSTOLIC BLOOD PRESSURE: 130 MMHG | OXYGEN SATURATION: 97 % | TEMPERATURE: 96.8 F | DIASTOLIC BLOOD PRESSURE: 82 MMHG | WEIGHT: 266.6 LBS | BODY MASS INDEX: 38.17 KG/M2 | RESPIRATION RATE: 18 BRPM | HEART RATE: 89 BPM

## 2025-08-05 DIAGNOSIS — C81.90 HODGKIN LYMPHOMA, UNSPECIFIED HODGKIN LYMPHOMA TYPE, UNSPECIFIED BODY REGION: Primary | ICD-10-CM

## 2025-08-05 LAB
ALBUMIN SERPL-MCNC: 4.1 G/DL (ref 3.5–5.2)
ALBUMIN/GLOB SERPL: 1.5 G/DL
ALP SERPL-CCNC: 101 U/L (ref 39–117)
ALT SERPL W P-5'-P-CCNC: 106 U/L (ref 1–41)
ANION GAP SERPL CALCULATED.3IONS-SCNC: 11.4 MMOL/L (ref 5–15)
AST SERPL-CCNC: 37 U/L (ref 1–40)
BASOPHILS # BLD AUTO: 0.05 10*3/MM3 (ref 0–0.2)
BASOPHILS NFR BLD AUTO: 0.6 % (ref 0–1.5)
BILIRUB SERPL-MCNC: 0.2 MG/DL (ref 0–1.2)
BUN SERPL-MCNC: 15.8 MG/DL (ref 6–20)
BUN/CREAT SERPL: 15.8 (ref 7–25)
CALCIUM SPEC-SCNC: 9.2 MG/DL (ref 8.6–10.5)
CHLORIDE SERPL-SCNC: 107 MMOL/L (ref 98–107)
CO2 SERPL-SCNC: 22.6 MMOL/L (ref 22–29)
CREAT SERPL-MCNC: 1 MG/DL (ref 0.76–1.27)
DEPRECATED RDW RBC AUTO: 55.5 FL (ref 37–54)
EGFRCR SERPLBLD CKD-EPI 2021: 105.8 ML/MIN/1.73
EOSINOPHIL # BLD AUTO: 0.11 10*3/MM3 (ref 0–0.4)
EOSINOPHIL NFR BLD AUTO: 1.4 % (ref 0.3–6.2)
ERYTHROCYTE [DISTWIDTH] IN BLOOD BY AUTOMATED COUNT: 15.7 % (ref 12.3–15.4)
GLOBULIN UR ELPH-MCNC: 2.7 GM/DL
GLUCOSE SERPL-MCNC: 120 MG/DL (ref 65–99)
HCT VFR BLD AUTO: 38.2 % (ref 37.5–51)
HGB BLD-MCNC: 12.5 G/DL (ref 13–17.7)
IMM GRANULOCYTES # BLD AUTO: 0.06 10*3/MM3 (ref 0–0.05)
IMM GRANULOCYTES NFR BLD AUTO: 0.8 % (ref 0–0.5)
LYMPHOCYTES # BLD AUTO: 2.23 10*3/MM3 (ref 0.7–3.1)
LYMPHOCYTES NFR BLD AUTO: 28 % (ref 19.6–45.3)
MCH RBC QN AUTO: 31.1 PG (ref 26.6–33)
MCHC RBC AUTO-ENTMCNC: 32.7 G/DL (ref 31.5–35.7)
MCV RBC AUTO: 95 FL (ref 79–97)
MONOCYTES # BLD AUTO: 0.78 10*3/MM3 (ref 0.1–0.9)
MONOCYTES NFR BLD AUTO: 9.8 % (ref 5–12)
NEUTROPHILS NFR BLD AUTO: 4.74 10*3/MM3 (ref 1.7–7)
NEUTROPHILS NFR BLD AUTO: 59.4 % (ref 42.7–76)
PLATELET # BLD AUTO: 253 10*3/MM3 (ref 140–450)
PMV BLD AUTO: 9.8 FL (ref 6–12)
POTASSIUM SERPL-SCNC: 4.3 MMOL/L (ref 3.5–5.2)
PROT SERPL-MCNC: 6.8 G/DL (ref 6–8.5)
RBC # BLD AUTO: 4.02 10*6/MM3 (ref 4.14–5.8)
SODIUM SERPL-SCNC: 141 MMOL/L (ref 136–145)
WBC NRBC COR # BLD AUTO: 7.97 10*3/MM3 (ref 3.4–10.8)

## 2025-08-05 PROCEDURE — 96411 CHEMO IV PUSH ADDL DRUG: CPT

## 2025-08-05 PROCEDURE — 96377 APPLICATON ON-BODY INJECTOR: CPT

## 2025-08-05 PROCEDURE — 96413 CHEMO IV INFUSION 1 HR: CPT

## 2025-08-05 PROCEDURE — 80053 COMPREHEN METABOLIC PANEL: CPT | Performed by: INTERNAL MEDICINE

## 2025-08-05 PROCEDURE — 25810000003 SODIUM CHLORIDE 0.9 % SOLUTION 250 ML FLEX CONT: Performed by: NURSE PRACTITIONER

## 2025-08-05 PROCEDURE — 96367 TX/PROPH/DG ADDL SEQ IV INF: CPT

## 2025-08-05 PROCEDURE — 85025 COMPLETE CBC W/AUTO DIFF WBC: CPT | Performed by: INTERNAL MEDICINE

## 2025-08-05 PROCEDURE — 25010000002 DEXAMETHASONE SODIUM PHOSPHATE 120 MG/30ML SOLUTION 30 ML VIAL: Performed by: NURSE PRACTITIONER

## 2025-08-05 PROCEDURE — 25010000002 PEGFILGRASTIM-CBQV 6 MG/0.6ML SOLUTION PREFILLED SYRINGE: Performed by: NURSE PRACTITIONER

## 2025-08-05 PROCEDURE — 96417 CHEMO IV INFUS EACH ADDL SEQ: CPT

## 2025-08-05 PROCEDURE — 25010000002 PALONOSETRON 0.25 MG/5ML SOLUTION PREFILLED SYRINGE: Performed by: NURSE PRACTITIONER

## 2025-08-05 PROCEDURE — 96375 TX/PRO/DX INJ NEW DRUG ADDON: CPT

## 2025-08-05 PROCEDURE — 96361 HYDRATE IV INFUSION ADD-ON: CPT

## 2025-08-05 PROCEDURE — 25010000002 HEPARIN LOCK FLUSH PER 10 UNITS: Performed by: INTERNAL MEDICINE

## 2025-08-05 PROCEDURE — 25010000002 FOSAPREPITANT PER 1 MG: Performed by: NURSE PRACTITIONER

## 2025-08-05 PROCEDURE — 25010000002 VINBLASTINE PER 1 MG: Performed by: NURSE PRACTITIONER

## 2025-08-05 PROCEDURE — 25810000003 SODIUM CHLORIDE 0.9 % SOLUTION: Performed by: NURSE PRACTITIONER

## 2025-08-05 PROCEDURE — 25010000002 DOXORUBICIN PER 10 MG: Performed by: NURSE PRACTITIONER

## 2025-08-05 PROCEDURE — 25010000002 DACARBAZINE PER 200 MG: Performed by: NURSE PRACTITIONER

## 2025-08-05 PROCEDURE — 25010000002 DACARBAZINE PER 100 MG: Performed by: NURSE PRACTITIONER

## 2025-08-05 PROCEDURE — 25010000002 NIVOLUMAB 240 MG/24ML SOLUTION 24 ML VIAL: Performed by: NURSE PRACTITIONER

## 2025-08-05 RX ORDER — DOXORUBICIN HYDROCHLORIDE 2 MG/ML
18.75 INJECTION, SOLUTION INTRAVENOUS ONCE
Status: COMPLETED | OUTPATIENT
Start: 2025-08-05 | End: 2025-08-05

## 2025-08-05 RX ORDER — PALONOSETRON 0.05 MG/ML
0.25 INJECTION, SOLUTION INTRAVENOUS ONCE
Status: COMPLETED | OUTPATIENT
Start: 2025-08-05 | End: 2025-08-05

## 2025-08-05 RX ORDER — DOXORUBICIN HYDROCHLORIDE 2 MG/ML
18.75 INJECTION, SOLUTION INTRAVENOUS ONCE
Status: CANCELLED | OUTPATIENT
Start: 2025-08-05

## 2025-08-05 RX ORDER — PALONOSETRON 0.05 MG/ML
0.25 INJECTION, SOLUTION INTRAVENOUS ONCE
Status: CANCELLED | OUTPATIENT
Start: 2025-08-05

## 2025-08-05 RX ORDER — HEPARIN SODIUM (PORCINE) LOCK FLUSH IV SOLN 100 UNIT/ML 100 UNIT/ML
500 SOLUTION INTRAVENOUS AS NEEDED
OUTPATIENT
Start: 2025-08-05

## 2025-08-05 RX ORDER — SODIUM CHLORIDE 0.9 % (FLUSH) 0.9 %
10 SYRINGE (ML) INJECTION AS NEEDED
Status: DISCONTINUED | OUTPATIENT
Start: 2025-08-05 | End: 2025-08-06 | Stop reason: HOSPADM

## 2025-08-05 RX ORDER — HEPARIN SODIUM (PORCINE) LOCK FLUSH IV SOLN 100 UNIT/ML 100 UNIT/ML
500 SOLUTION INTRAVENOUS AS NEEDED
Status: DISCONTINUED | OUTPATIENT
Start: 2025-08-05 | End: 2025-08-06 | Stop reason: HOSPADM

## 2025-08-05 RX ORDER — SODIUM CHLORIDE 9 MG/ML
20 INJECTION, SOLUTION INTRAVENOUS ONCE
Status: CANCELLED | OUTPATIENT
Start: 2025-08-05

## 2025-08-05 RX ORDER — SODIUM CHLORIDE 0.9 % (FLUSH) 0.9 %
10 SYRINGE (ML) INJECTION AS NEEDED
OUTPATIENT
Start: 2025-08-05

## 2025-08-05 RX ADMIN — VINBLASTINE SULFATE 7 MG: 1 INJECTION INTRAVENOUS at 12:31

## 2025-08-05 RX ADMIN — HEPARIN 500 UNITS: 100 SYRINGE at 14:03

## 2025-08-05 RX ADMIN — DACARBAZINE 890 MG: 10 INJECTION, POWDER, FOR SOLUTION INTRAVENOUS at 12:46

## 2025-08-05 RX ADMIN — SODIUM CHLORIDE 240 MG: 9 INJECTION, SOLUTION INTRAVENOUS at 13:24

## 2025-08-05 RX ADMIN — PALONOSETRON 0.25 MG: 0.25 INJECTION, SOLUTION INTRAVENOUS at 11:18

## 2025-08-05 RX ADMIN — Medication 10 ML: at 14:03

## 2025-08-05 RX ADMIN — DEXAMETHASONE SODIUM PHOSPHATE 12 MG: 4 INJECTION, SOLUTION INTRA-ARTICULAR; INTRALESIONAL; INTRAMUSCULAR; INTRAVENOUS; SOFT TISSUE at 11:22

## 2025-08-05 RX ADMIN — PEGFILGRASTIM-CBQV 6 MG: 6 INJECTION, SOLUTION SUBCUTANEOUS at 14:03

## 2025-08-05 RX ADMIN — SODIUM CHLORIDE 1000 ML: 900 INJECTION, SOLUTION INTRAVENOUS at 10:53

## 2025-08-05 RX ADMIN — FOSAPREPITANT 100 ML: 150 INJECTION, POWDER, LYOPHILIZED, FOR SOLUTION INTRAVENOUS at 11:38

## 2025-08-05 RX ADMIN — DOXORUBICIN HYDROCHLORIDE 44 MG: 2 INJECTION, SOLUTION INTRAVENOUS at 12:20

## 2025-08-12 DIAGNOSIS — C81.90 HODGKIN LYMPHOMA, UNSPECIFIED HODGKIN LYMPHOMA TYPE, UNSPECIFIED BODY REGION: ICD-10-CM

## 2025-08-12 RX ORDER — ONDANSETRON 8 MG/1
8 TABLET, FILM COATED ORAL 3 TIMES DAILY PRN
Qty: 30 TABLET | Refills: 5 | Status: SHIPPED | OUTPATIENT
Start: 2025-08-12

## 2025-08-12 RX ORDER — ALLOPURINOL 300 MG/1
300 TABLET ORAL DAILY
Qty: 30 TABLET | Refills: 6 | Status: SHIPPED | OUTPATIENT
Start: 2025-08-12

## 2025-08-15 ENCOUNTER — TELEPHONE (OUTPATIENT)
Dept: ONCOLOGY | Facility: CLINIC | Age: 25
End: 2025-08-15

## 2025-08-19 ENCOUNTER — HOSPITAL ENCOUNTER (OUTPATIENT)
Dept: ONCOLOGY | Facility: HOSPITAL | Age: 25
Discharge: HOME | End: 2025-08-19
Admitting: INTERNAL MEDICINE
Payer: COMMERCIAL

## 2025-08-19 VITALS
OXYGEN SATURATION: 98 % | TEMPERATURE: 96.9 F | RESPIRATION RATE: 20 BRPM | SYSTOLIC BLOOD PRESSURE: 121 MMHG | WEIGHT: 265.4 LBS | HEIGHT: 70 IN | BODY MASS INDEX: 37.99 KG/M2 | DIASTOLIC BLOOD PRESSURE: 74 MMHG | HEART RATE: 82 BPM

## 2025-08-19 DIAGNOSIS — C81.90 HODGKIN LYMPHOMA, UNSPECIFIED HODGKIN LYMPHOMA TYPE, UNSPECIFIED BODY REGION: Primary | ICD-10-CM

## 2025-08-19 LAB
ALP BLD-CCNC: 77 U/L (ref 53–128)
BASOPHILS # BLD AUTO: 0.05 10*3/MM3 (ref 0–0.2)
BASOPHILS NFR BLD AUTO: 0.8 % (ref 0–1.5)
BUN BLDA-MCNC: 16 MG/DL (ref 7–22)
CALCIUM BLD QL: 9.2 MG/DL (ref 8–10.3)
CHLORIDE BLDA-SCNC: 109 MMOL/L (ref 98–108)
CO2 BLDA-SCNC: 26 MMOL/L (ref 18–33)
CREAT BLDA-MCNC: 1 MG/DL (ref 0.6–1.2)
DEPRECATED RDW RBC AUTO: 54.4 FL (ref 37–54)
EGFRCR SERPLBLD CKD-EPI 2021: 105.8 ML/MIN/1.73
EOSINOPHIL # BLD AUTO: 0.04 10*3/MM3 (ref 0–0.4)
EOSINOPHIL NFR BLD AUTO: 0.7 % (ref 0.3–6.2)
ERYTHROCYTE [DISTWIDTH] IN BLOOD BY AUTOMATED COUNT: 15.3 % (ref 12.3–15.4)
GLUCOSE BLDC GLUCOMTR-MCNC: 120 MG/DL (ref 73–118)
HCT VFR BLD AUTO: 36.8 % (ref 37.5–51)
HGB BLD-MCNC: 12.5 G/DL (ref 13–17.7)
IMM GRANULOCYTES # BLD AUTO: 0.01 10*3/MM3 (ref 0–0.05)
IMM GRANULOCYTES NFR BLD AUTO: 0.2 % (ref 0–0.5)
LYMPHOCYTES # BLD AUTO: 2.23 10*3/MM3 (ref 0.7–3.1)
LYMPHOCYTES NFR BLD AUTO: 37.6 % (ref 19.6–45.3)
MCH RBC QN AUTO: 32.4 PG (ref 26.6–33)
MCHC RBC AUTO-ENTMCNC: 34 G/DL (ref 31.5–35.7)
MCV RBC AUTO: 95.3 FL (ref 79–97)
MONOCYTES # BLD AUTO: 0.65 10*3/MM3 (ref 0.1–0.9)
MONOCYTES NFR BLD AUTO: 11 % (ref 5–12)
NEUTROPHILS NFR BLD AUTO: 2.95 10*3/MM3 (ref 1.7–7)
NEUTROPHILS NFR BLD AUTO: 49.7 % (ref 42.7–76)
PLATELET # BLD AUTO: 204 10*3/MM3 (ref 140–450)
PMV BLD AUTO: 10.4 FL (ref 6–12)
POC ALBUMIN: 3.5 G/L (ref 3.3–5.5)
POC ALT (SGPT): 63 U/L (ref 10–47)
POC AST (SGOT): 32 U/L (ref 11–38)
POC TOTAL BILIRUBIN: 0.5 MG/DL (ref 0.2–1.6)
POC TOTAL PROTEIN: 6.7 G/DL (ref 6.4–8.1)
POTASSIUM BLDA-SCNC: 4.2 MMOL/L (ref 3.6–5.1)
RBC # BLD AUTO: 3.86 10*6/MM3 (ref 4.14–5.8)
SODIUM BLD-SCNC: 144 MMOL/L (ref 128–145)
T4 SERPL-MCNC: 5.61 MCG/DL (ref 4.5–11.7)
TSH SERPL DL<=0.05 MIU/L-ACNC: 2.22 UIU/ML (ref 0.27–4.2)
WBC NRBC COR # BLD AUTO: 5.93 10*3/MM3 (ref 3.4–10.8)

## 2025-08-19 PROCEDURE — 96417 CHEMO IV INFUS EACH ADDL SEQ: CPT

## 2025-08-19 PROCEDURE — 25010000002 HEPARIN LOCK FLUSH PER 10 UNITS: Performed by: INTERNAL MEDICINE

## 2025-08-19 PROCEDURE — 25010000002 NIVOLUMAB 240 MG/24ML SOLUTION 24 ML VIAL: Performed by: NURSE PRACTITIONER

## 2025-08-19 PROCEDURE — 25010000002 FOSAPREPITANT PER 1 MG: Performed by: NURSE PRACTITIONER

## 2025-08-19 PROCEDURE — 25010000002 DACARBAZINE PER 200 MG: Performed by: NURSE PRACTITIONER

## 2025-08-19 PROCEDURE — 85025 COMPLETE CBC W/AUTO DIFF WBC: CPT | Performed by: INTERNAL MEDICINE

## 2025-08-19 PROCEDURE — 96367 TX/PROPH/DG ADDL SEQ IV INF: CPT

## 2025-08-19 PROCEDURE — 84443 ASSAY THYROID STIM HORMONE: CPT | Performed by: INTERNAL MEDICINE

## 2025-08-19 PROCEDURE — 25010000002 PALONOSETRON 0.25 MG/5ML SOLUTION PREFILLED SYRINGE: Performed by: NURSE PRACTITIONER

## 2025-08-19 PROCEDURE — 96377 APPLICATON ON-BODY INJECTOR: CPT

## 2025-08-19 PROCEDURE — 25810000003 SODIUM CHLORIDE 0.9 % SOLUTION: Performed by: INTERNAL MEDICINE

## 2025-08-19 PROCEDURE — 96413 CHEMO IV INFUSION 1 HR: CPT

## 2025-08-19 PROCEDURE — 96375 TX/PRO/DX INJ NEW DRUG ADDON: CPT

## 2025-08-19 PROCEDURE — 84436 ASSAY OF TOTAL THYROXINE: CPT | Performed by: INTERNAL MEDICINE

## 2025-08-19 PROCEDURE — 25010000002 VINBLASTINE PER 1 MG: Performed by: NURSE PRACTITIONER

## 2025-08-19 PROCEDURE — 25010000002 DOXORUBICIN PER 10 MG: Performed by: NURSE PRACTITIONER

## 2025-08-19 PROCEDURE — 25010000002 PEGFILGRASTIM-CBQV 6 MG/0.6ML SOLUTION PREFILLED SYRINGE: Performed by: NURSE PRACTITIONER

## 2025-08-19 PROCEDURE — 25810000003 SODIUM CHLORIDE 0.9 % SOLUTION 250 ML FLEX CONT: Performed by: NURSE PRACTITIONER

## 2025-08-19 PROCEDURE — 25010000002 DACARBAZINE PER 100 MG: Performed by: NURSE PRACTITIONER

## 2025-08-19 PROCEDURE — 25010000002 DEXAMETHASONE SODIUM PHOSPHATE 120 MG/30ML SOLUTION 30 ML VIAL: Performed by: NURSE PRACTITIONER

## 2025-08-19 PROCEDURE — 80053 COMPREHEN METABOLIC PANEL: CPT

## 2025-08-19 PROCEDURE — 96411 CHEMO IV PUSH ADDL DRUG: CPT

## 2025-08-19 RX ORDER — HEPARIN SODIUM (PORCINE) LOCK FLUSH IV SOLN 100 UNIT/ML 100 UNIT/ML
500 SOLUTION INTRAVENOUS AS NEEDED
Status: DISCONTINUED | OUTPATIENT
Start: 2025-08-19 | End: 2025-08-20 | Stop reason: HOSPADM

## 2025-08-19 RX ORDER — PALONOSETRON 0.05 MG/ML
0.25 INJECTION, SOLUTION INTRAVENOUS ONCE
Status: CANCELLED | OUTPATIENT
Start: 2025-08-19

## 2025-08-19 RX ORDER — DOXORUBICIN HYDROCHLORIDE 2 MG/ML
25 INJECTION, SOLUTION INTRAVENOUS ONCE
Status: CANCELLED | OUTPATIENT
Start: 2025-08-19

## 2025-08-19 RX ORDER — DOXORUBICIN HYDROCHLORIDE 2 MG/ML
25 INJECTION, SOLUTION INTRAVENOUS ONCE
Status: COMPLETED | OUTPATIENT
Start: 2025-08-19 | End: 2025-08-19

## 2025-08-19 RX ORDER — SODIUM CHLORIDE 0.9 % (FLUSH) 0.9 %
10 SYRINGE (ML) INJECTION AS NEEDED
OUTPATIENT
Start: 2025-08-19

## 2025-08-19 RX ORDER — SODIUM CHLORIDE 0.9 % (FLUSH) 0.9 %
10 SYRINGE (ML) INJECTION AS NEEDED
Status: DISCONTINUED | OUTPATIENT
Start: 2025-08-19 | End: 2025-08-20 | Stop reason: HOSPADM

## 2025-08-19 RX ORDER — HEPARIN SODIUM (PORCINE) LOCK FLUSH IV SOLN 100 UNIT/ML 100 UNIT/ML
500 SOLUTION INTRAVENOUS AS NEEDED
OUTPATIENT
Start: 2025-08-19

## 2025-08-19 RX ORDER — PALONOSETRON 0.05 MG/ML
0.25 INJECTION, SOLUTION INTRAVENOUS ONCE
Status: COMPLETED | OUTPATIENT
Start: 2025-08-19 | End: 2025-08-19

## 2025-08-19 RX ORDER — SODIUM CHLORIDE 9 MG/ML
20 INJECTION, SOLUTION INTRAVENOUS ONCE
Status: COMPLETED | OUTPATIENT
Start: 2025-08-19 | End: 2025-08-19

## 2025-08-19 RX ADMIN — HEPARIN 500 UNITS: 100 SYRINGE at 12:15

## 2025-08-19 RX ADMIN — VINBLASTINE SULFATE 14 MG: 1 INJECTION INTRAVENOUS at 10:48

## 2025-08-19 RX ADMIN — Medication 10 ML: at 12:15

## 2025-08-19 RX ADMIN — DOXORUBICIN HYDROCHLORIDE 30 MG: 2 INJECTION, SOLUTION INTRAVENOUS at 10:38

## 2025-08-19 RX ADMIN — SODIUM CHLORIDE 890 MG: 9 INJECTION, SOLUTION INTRAVENOUS at 11:03

## 2025-08-19 RX ADMIN — SODIUM CHLORIDE 20 ML/HR: 900 INJECTION, SOLUTION INTRAVENOUS at 08:41

## 2025-08-19 RX ADMIN — PEGFILGRASTIM-CBQV 6 MG: 6 INJECTION, SOLUTION SUBCUTANEOUS at 12:17

## 2025-08-19 RX ADMIN — SODIUM CHLORIDE 240 MG: 9 INJECTION, SOLUTION INTRAVENOUS at 11:41

## 2025-08-19 RX ADMIN — FOSAPREPITANT 100 ML: 150 INJECTION, POWDER, LYOPHILIZED, FOR SOLUTION INTRAVENOUS at 09:32

## 2025-08-19 RX ADMIN — DEXAMETHASONE SODIUM PHOSPHATE 12 MG: 4 INJECTION, SOLUTION INTRA-ARTICULAR; INTRALESIONAL; INTRAMUSCULAR; INTRAVENOUS; SOFT TISSUE at 10:06

## 2025-08-19 RX ADMIN — PALONOSETRON 0.25 MG: 0.25 INJECTION, SOLUTION INTRAVENOUS at 09:31

## 2025-08-25 RX ORDER — FERROUS SULFATE 325(65) MG
325 TABLET ORAL
Qty: 30 TABLET | Refills: 3 | Status: SHIPPED | OUTPATIENT
Start: 2025-08-25

## 2025-08-28 ENCOUNTER — OFFICE VISIT (OUTPATIENT)
Dept: ONCOLOGY | Facility: CLINIC | Age: 25
End: 2025-08-28
Payer: COMMERCIAL

## 2025-08-28 ENCOUNTER — HOSPITAL ENCOUNTER (OUTPATIENT)
Dept: ONCOLOGY | Facility: HOSPITAL | Age: 25
Discharge: HOME | End: 2025-08-28
Admitting: INTERNAL MEDICINE
Payer: COMMERCIAL

## 2025-08-28 VITALS
HEIGHT: 70 IN | SYSTOLIC BLOOD PRESSURE: 124 MMHG | TEMPERATURE: 98 F | DIASTOLIC BLOOD PRESSURE: 89 MMHG | HEART RATE: 115 BPM | BODY MASS INDEX: 37.37 KG/M2 | RESPIRATION RATE: 18 BRPM | WEIGHT: 261 LBS | OXYGEN SATURATION: 97 %

## 2025-08-28 DIAGNOSIS — C81.90 HODGKIN LYMPHOMA, UNSPECIFIED HODGKIN LYMPHOMA TYPE, UNSPECIFIED BODY REGION: Primary | ICD-10-CM

## 2025-08-28 DIAGNOSIS — K62.89 ANAL PAIN: ICD-10-CM

## 2025-08-28 LAB
BASOPHILS # BLD AUTO: 0.05 10*3/MM3 (ref 0–0.2)
BASOPHILS NFR BLD AUTO: 0.7 % (ref 0–1.5)
DEPRECATED RDW RBC AUTO: 48 FL (ref 37–54)
EOSINOPHIL # BLD AUTO: 0.11 10*3/MM3 (ref 0–0.4)
EOSINOPHIL NFR BLD AUTO: 1.5 % (ref 0.3–6.2)
ERYTHROCYTE [DISTWIDTH] IN BLOOD BY AUTOMATED COUNT: 13.9 % (ref 12.3–15.4)
HCT VFR BLD AUTO: 35.2 % (ref 37.5–51)
HGB BLD-MCNC: 12.1 G/DL (ref 13–17.7)
IMM GRANULOCYTES # BLD AUTO: 0.06 10*3/MM3 (ref 0–0.05)
IMM GRANULOCYTES NFR BLD AUTO: 0.8 % (ref 0–0.5)
LYMPHOCYTES # BLD AUTO: 1.81 10*3/MM3 (ref 0.7–3.1)
LYMPHOCYTES NFR BLD AUTO: 24.8 % (ref 19.6–45.3)
MCH RBC QN AUTO: 32.4 PG (ref 26.6–33)
MCHC RBC AUTO-ENTMCNC: 34.4 G/DL (ref 31.5–35.7)
MCV RBC AUTO: 94.1 FL (ref 79–97)
MONOCYTES # BLD AUTO: 0.71 10*3/MM3 (ref 0.1–0.9)
MONOCYTES NFR BLD AUTO: 9.7 % (ref 5–12)
NEUTROPHILS NFR BLD AUTO: 4.56 10*3/MM3 (ref 1.7–7)
NEUTROPHILS NFR BLD AUTO: 62.5 % (ref 42.7–76)
PLATELET # BLD AUTO: 286 10*3/MM3 (ref 140–450)
PMV BLD AUTO: 10.1 FL (ref 6–12)
RBC # BLD AUTO: 3.74 10*6/MM3 (ref 4.14–5.8)
WBC NRBC COR # BLD AUTO: 7.3 10*3/MM3 (ref 3.4–10.8)

## 2025-08-28 PROCEDURE — 25010000002 HEPARIN LOCK FLUSH PER 10 UNITS: Performed by: INTERNAL MEDICINE

## 2025-08-28 PROCEDURE — 85025 COMPLETE CBC W/AUTO DIFF WBC: CPT | Performed by: INTERNAL MEDICINE

## 2025-08-28 PROCEDURE — 36591 DRAW BLOOD OFF VENOUS DEVICE: CPT

## 2025-08-28 RX ORDER — HEPARIN SODIUM (PORCINE) LOCK FLUSH IV SOLN 100 UNIT/ML 100 UNIT/ML
500 SOLUTION INTRAVENOUS AS NEEDED
OUTPATIENT
Start: 2025-08-28

## 2025-08-28 RX ORDER — HEPARIN SODIUM (PORCINE) LOCK FLUSH IV SOLN 100 UNIT/ML 100 UNIT/ML
500 SOLUTION INTRAVENOUS AS NEEDED
Status: DISCONTINUED | OUTPATIENT
Start: 2025-08-28 | End: 2025-08-30 | Stop reason: HOSPADM

## 2025-08-28 RX ORDER — SODIUM CHLORIDE 0.9 % (FLUSH) 0.9 %
10 SYRINGE (ML) INJECTION AS NEEDED
Status: DISCONTINUED | OUTPATIENT
Start: 2025-08-28 | End: 2025-08-30 | Stop reason: HOSPADM

## 2025-08-28 RX ORDER — SODIUM CHLORIDE 0.9 % (FLUSH) 0.9 %
10 SYRINGE (ML) INJECTION AS NEEDED
OUTPATIENT
Start: 2025-08-28

## 2025-08-28 RX ADMIN — HEPARIN 500 UNITS: 100 SYRINGE at 14:15

## 2025-08-28 RX ADMIN — Medication 10 ML: at 14:15

## (undated) DEVICE — NDL HYPO PRECISIONGLIDE/REG 25G 5/8 BLU

## (undated) DEVICE — SOL IRR NACL 0.9PCT BO 1000ML

## (undated) DEVICE — POSTN ARM CRDL LAMIN PK/2

## (undated) DEVICE — PENCL SMOKE/EVAC MEGADYNE TELESCP 15FT

## (undated) DEVICE — ANTIBACTERIAL UNDYED BRAIDED (POLYGLACTIN 910), SYNTHETIC ABSORBABLE SUTURE: Brand: COATED VICRYL

## (undated) DEVICE — SOLUTION,WATER,IRRIGATION,1000ML,STERILE: Brand: MEDLINE

## (undated) DEVICE — PAD, GROUNDING, UNIVERSAL, SPLIT, 9': Brand: MEDLINE

## (undated) DEVICE — INTENDED FOR TISSUE SEPARATION, AND OTHER PROCEDURES THAT REQUIRE A SHARP SURGICAL BLADE TO PUNCTURE OR CUT.: Brand: BARD-PARKER ® CARBON RIB-BACK BLADES

## (undated) DEVICE — THE STERILE CAMERA HANDLE COVER IS FOR USE WITH THE STERIS SURGICAL LIGHTING AND VISUALIZATION SYSTEMS.

## (undated) DEVICE — SYR CONTRL LUERLOK 10CC

## (undated) DEVICE — C-ARM: Brand: UNBRANDED

## (undated) DEVICE — NDL HYPO PRECISIONGLIDE REG 22G 1 1/2

## (undated) DEVICE — GLOVE,SURG,SENSICARE SLT,LF,PF,6: Brand: MEDLINE

## (undated) DEVICE — SYR LL TP 10ML STRL

## (undated) DEVICE — KT SURG TURNOVER 050

## (undated) DEVICE — GLV SURG SENSICARE POLYISPRN W/ALOE PF LF 6.5 GRN STRL

## (undated) DEVICE — UNDRGLV SURG BIOGEL PIMICROINDICATOR SYNTH SZ7.5PF STRL PR/2

## (undated) DEVICE — THE STERILE LIGHT HANDLE COVER IS USED WITH STERIS SURGICAL LIGHTING AND VISUALIZATION SYSTEMS.

## (undated) DEVICE — 3M™ STERI-STRIP™ REINFORCED ADHESIVE SKIN CLOSURES, R1546, 1/4 IN X 4 IN (6 MM X 100 MM), 10 STRIPS/ENVELOPE: Brand: 3M™ STERI-STRIP™

## (undated) DEVICE — GAUZE,SPONGE,2"X2",8PLY,STERILE,LF,2'S: Brand: MEDLINE

## (undated) DEVICE — SUT PROLN 3/0 8832H

## (undated) DEVICE — PENCL HND ROCKRSWTCH HOLSTR EZ CLEAN TP CRD 10FT

## (undated) DEVICE — DRSNG WND BORDR/ADHS NONADHR/GZ LF 4X4IN STRL

## (undated) DEVICE — PK MINOR LAPAROTOMY 50

## (undated) DEVICE — DRSNG SURESITE WNDW 4X4.5

## (undated) DEVICE — SUT SILK 2/0 SH 30IN K833H

## (undated) DEVICE — DECANTER: Brand: UNBRANDED